# Patient Record
Sex: MALE | Race: WHITE | NOT HISPANIC OR LATINO | Employment: OTHER | URBAN - METROPOLITAN AREA
[De-identification: names, ages, dates, MRNs, and addresses within clinical notes are randomized per-mention and may not be internally consistent; named-entity substitution may affect disease eponyms.]

---

## 2017-02-27 ENCOUNTER — OFFICE VISIT (OUTPATIENT)
Dept: FAMILY MEDICINE | Facility: CLINIC | Age: 62
End: 2017-02-27
Payer: COMMERCIAL

## 2017-02-27 VITALS
HEIGHT: 70 IN | TEMPERATURE: 96.9 F | OXYGEN SATURATION: 96 % | BODY MASS INDEX: 33.93 KG/M2 | DIASTOLIC BLOOD PRESSURE: 82 MMHG | WEIGHT: 237 LBS | HEART RATE: 51 BPM | SYSTOLIC BLOOD PRESSURE: 139 MMHG

## 2017-02-27 DIAGNOSIS — H61.23 BILATERAL IMPACTED CERUMEN: Primary | ICD-10-CM

## 2017-02-27 PROCEDURE — 99213 OFFICE O/P EST LOW 20 MIN: CPT | Performed by: FAMILY MEDICINE

## 2017-02-27 NOTE — MR AVS SNAPSHOT
After Visit Summary   2/27/2017    Quang Gillespie    MRN: [id number]           Patient Information     Date Of Birth          1955        Visit Information        Provider Department      2/27/2017 7:40 AM Je Henley MD [facility]        Today's Diagnoses     Bilateral impacted cerumen    -  1      Care Instructions          Thank you for choosing [facility].  You may be receiving a survey in the mail from [facility] regarding your visit today.  Please take a few minutes to complete and return the survey to let us know how we are doing.      If you have questions or concerns, please contact us via [employer] or you can contact your care team at [phone].    Our Clinic hours are:  Monday 6:40 am  to 7:00 pm  Tuesday -Friday 6:40 am to 5:00 pm    The [place] outpatient lab hours are:  Monday - Friday 6:10 am to 4:45 pm  Saturdays 7:00 am to 11:00 am  Appointments are required, call [phone]    If you have clinical questions after hours or would like to schedule an appointment,  call the clinic at [phone].        Follow-ups after your visit        Who to contact     If you have questions or need follow up information about today's clinic visit or your schedule please contact [facility] directly at [phone].  Normal or non-critical lab and imaging results will be communicated to you by [employer], letter or phone within 4 business days after the clinic has received the results. If you do not hear from us within 7 days, please contact the clinic through [employer] or phone. If you have a critical or abnormal lab result, we will notify you by phone as soon as possible.  Submit refill requests through [employer] or call your pharmacy and they will forward the refill request to us. Please allow 3 business days for your refill to be completed.          Additional Information About Your Visit        [employer] Information     Microtune lets you send  "messages to your doctor, view your test results, renew your prescriptions, schedule appointments and more. To sign up, go to [email or web address] . Click on \"Log in\" on the left side of the screen, which will take you to the Welcome page. Then click on \"Sign up Now\" on the right side of the page.     You will be asked to enter the access code listed below, as well as some personal information. Please follow the directions to create your username and password.     Your access code is: [email or web address]  Expires: 2017 10:09 AM     Your access code will  in 90 days. If you need help or a new code, please call your [place] clinic or [phone].        Care EveryWhere ID     This is your Care EveryWhere ID. This could be used by other organizations to access your [place] medical records  [id number]        Your Vitals Were     Pulse Temperature Height Pulse Oximetry BMI (Body Mass Index)       51 96.9  F (36.1  C) (Tympanic) 5' 9.5\" (1.765 m) 96% 34.5 kg/m2        Blood Pressure from Last 3 Encounters:   17 139/82   16 131/75   03/18/15 120/78    Weight from Last 3 Encounters:   17 237 lb (107.5 kg)   16 233 lb (105.7 kg)   03/18/15 228 lb (103.4 kg)              Today, you had the following     No orders found for display       Primary Care Provider Office Phone # Fax #    Rick Canela [phone]       [facility] [address] [facility] [address]        Thank you!     Thank you for choosing [facility]  for your care. Our goal is always to provide you with excellent care. Hearing back from our patients is one way we can continue to improve our services. Please take a few minutes to complete the written survey that you may receive in the mail after your visit with us. Thank you!             Your Updated Medication List - Protect others around you: Learn how to safely use, store and throw away your medicines at " www.disposemymeds.org.          This list is accurate as of: 2/27/17 10:09 AM.  Always use your most recent med list.                   Brand Name Dispense Instructions for use    aspirin 81 MG tablet      Take 1 tablet by mouth daily.       carbamide peroxide 6.5 % otic solution    DEBROX    30 mL    Place 5-10 drops into both ears 2 times daily       lisinopril 5 MG tablet    PRINIVIL/ZESTRIL    30 tablet    Take 1 tablet (5 mg) by mouth daily       metoprolol 50 MG tablet    LOPRESSOR    60 tablet    Take 1 tablet (50 mg) by mouth 2 times daily       rosuvastatin 20 MG tablet    CRESTOR    30 tablet    Take 1 tablet (20 mg) by mouth daily       triamterene-hydrochlorothiazide 37.5-25 MG per tablet    MAXZIDE-25    30 tablet    Take 1 tablet by mouth daily

## 2017-02-27 NOTE — NURSING NOTE
"Chief Complaint   Patient presents with     Cerumen Impaction       Initial /82 (BP Location: Right arm, Cuff Size: Adult Large)  Pulse 51  Temp 96.9  F (36.1  C) (Tympanic)  Ht 5' 9.5\" (1.765 m)  Wt 237 lb (107.5 kg)  SpO2 96%  BMI 34.5 kg/m2 Estimated body mass index is 34.5 kg/(m^2) as calculated from the following:    Height as of this encounter: 5' 9.5\" (1.765 m).    Weight as of this encounter: 237 lb (107.5 kg).  Medication Reconciliation: complete  "

## 2017-02-27 NOTE — PROGRESS NOTES
SUBJECTIVE:                                                    Quang Gillespie is a 61 year old male who presents to clinic today for the following health issues:      ENT Symptoms             Symptoms: cc Present Absent Comment   Fever/Chills   x    Fatigue   x    Muscle Aches   x    Eye Irritation   x    Sneezing   x    Nasal Avtar/Drg   x    Sinus Pressure/Pain   x    Loss of smell   x    Dental pain   x    Sore Throat   x    Swollen Glands   x    Ear Pain/Fullness  x  Both ears plugged    Cough   x    Wheeze   x    Chest Pain   x    Shortness of breath   x    Rash   x    Other         Symptom duration:  7-16 but had partially cleaned at that time    Symptom severity:  moderate   Treatments tried:  debrox ear gtts    Contacts:  none          Here with ear impaction.    Patient states that he has had this feeling in both ears for a couple of weeks. He has been using the debrox . He is here for ear irrigation. No other systemic symptoms .    Problem list and histories reviewed & adjusted, as indicated.  Additional history: as documented    Patient Active Problem List   Diagnosis     Hyperlipidemia with target LDL less than 130     Hypertension goal BP (blood pressure) < 140/90     CAD (coronary artery disease)     Advanced directives, counseling/discussion     Impacted cerumen of both ears     Non morbid obesity due to excess calories     Past Surgical History   Procedure Laterality Date     Cardiac surgery       1 stentplaced       Social History   Substance Use Topics     Smoking status: Never Smoker     Smokeless tobacco: Never Used      Comment: Never smoker; no secondhand smoke exposure     Alcohol use Yes      Comment: 2 drinks per day     Family History   Problem Relation Age of Onset     CANCER Mother      brain tumor     C.A.D. Mother      Alcohol/Drug Mother      smoker     CANCER Father      hodgkins     Alcohol/Drug Father      smoker         Current Outpatient Prescriptions   Medication Sig Dispense  "Refill     triamterene-hydrochlorothiazide (MAXZIDE-25) 37.5-25 MG per tablet Take 1 tablet by mouth daily 30 tablet 11     rosuvastatin (CRESTOR) 20 MG tablet Take 1 tablet (20 mg) by mouth daily 30 tablet 11     metoprolol (LOPRESSOR) 50 MG tablet Take 1 tablet (50 mg) by mouth 2 times daily 60 tablet 11     lisinopril (PRINIVIL,ZESTRIL) 5 MG tablet Take 1 tablet (5 mg) by mouth daily 30 tablet 11     aspirin 81 MG tablet Take 1 tablet by mouth daily.       carbamide peroxide (DEBROX) 6.5 % otic (EAR) solution Place 5-10 drops into both ears 2 times daily 30 mL 0     No Known Allergies    ROS:  C: NEGATIVE for fever, chills, change in weight  EYES: NEGATIVE for vision changes or irritation  ENT/MOUTH: NEGATIVE for ear, mouth and throat problems and POSITIVE for earache bilateral  R: NEGATIVE for significant cough or SOB  CV: NEGATIVE for chest pain, palpitations or peripheral edema    OBJECTIVE:                                                    /82 (BP Location: Right arm, Cuff Size: Adult Large)  Pulse 51  Temp 96.9  F (36.1  C) (Tympanic)  Ht 5' 9.5\" (1.765 m)  Wt 237 lb (107.5 kg)  SpO2 96%  BMI 34.5 kg/m2  Body mass index is 34.5 kg/(m^2).  GENERAL: healthy, alert and no distress  HENT: normal cephalic/atraumatic, both ears: occluded with wax, nose and mouth without ulcers or lesions, oropharynx clear and oral mucous membranes moist  NECK: no adenopathy, no asymmetry, masses, or scars and thyroid normal to palpation  RESP: lungs clear to auscultation - no rales, rhonchi or wheezes  CV: regular rate and rhythm, normal S1 S2, no S3 or S4, no murmur, click or rub, no peripheral edema and peripheral pulses strong  MS: no gross musculoskeletal defects noted, no edema    Diagnostic Test Results:  none      ASSESSMENT/PLAN:                                                        1. Bilateral impacted cerumen  Ear irrigation performed. No infection on second look      FUTURE APPOINTMENTS:       - Follow-up visit " as needed.    Je Henley MD  [facility]

## 2017-05-15 ENCOUNTER — TELEPHONE (OUTPATIENT)
Dept: NURSING | Facility: CLINIC | Age: 62
End: 2017-05-15

## 2017-05-15 NOTE — TELEPHONE ENCOUNTER
"Call Type: Triage Call    Presenting Problem: \"Lately I've had shortness of breath when I try  to exhert myself with anything.\" Denies all symptoms at present.  Triage Note:  Guideline Title: Breathing Problems  Recommended Disposition: See Provider within 24 hours  Original Inclination: Wanted to speak with a nurse  Override Disposition:  Intended Action: See /Make Appt  Physician Contacted: No  Breathing problems occur with activity or exercise that has not caused symptoms  before AND are relieved by stopping the activity or exercise ?  YES  New or worsening signs and symptoms that may indicate shock ? NO  Diabetes and breathing problems ? NO  Coughing up large amount of obvious blood (not blood-streaked sputum) ? NO  Cough without breathing difficulty ? NO  Not breathing (no air movement from nose/mouth; no chest or abdomen movement) ? NO  Sudden onset of severe breathing difficulty ? NO  Gradual onset of breathing problems occur when lying down OR that limit normal  activity ? NO  Known or suspected ingestion or inhalation of foreign object (grasping at throat,  unable to speak, high pitched noise when breathing in, unable to swallow) AND  object still lodged ? NO  Following blunt trauma or penetrating wound to chest, throat, neck or abdomen OR  change in shape of chest wall ? NO  Breathing problems develop at high altitudes (above 8,000 ft.) OR increases with  altitude ? NO  New or worsening shortness of breath/difficulty breathing AND any other cardiac  signs/symptoms for more than 5 minutes, now or within last hour ? NO  Symptoms following smoke or fume inhalation AND have not improved or are worsening  after 8 hours of home care ? NO  Coughed out foreign object after choking episode and NO further choking symptoms  (can now talk, no coughing, gasping, or wheezing) ? NO  New or worsening breathing difficulty AND new onset of bloody or blood-streaked  sputum ? NO  New or increasing production of yellow, " green or brown sputum ? NO  New onset shortness of breath AND recent surgery or trauma (within 4 wks.),  prolonged immobilization (bedrest, long travel) OR taking medication with  estrogen ? NO  Coughing, wheezing or shortness of breath continues after foreign body is cleared  (expelled) from airway ? NO  Currently having difficulty breathing after near drowning ? NO  Episode(s) of hyperventilation AND not previously evaluated ? NO  New or worsening shortness of breath/difficulty breathing AND new onset of  fatigue, weakness, confusion, or change in ability to care out daily activities ?  NO  Difficulty swallowing ? NO  Signs and symptoms of anaphylaxis ? NO  Recurring episodes of hiccups or an episode that can't be stopped (more than 24  hours) ? NO  Sudden onset of shortness of breath, chest pain and cough with blood tinged sputum  ? NO  Being treated by a provider for a secondary infection AND no improvement in  symptoms, symptoms have worsened OR has new symptoms after following treatment  plan for the time specified by provider. ? NO  New or worsening breathing problems that have not been evaluated OR without a  treatment plan ? NO  New or worsening breathing problems not responding to treatment plan ? NO  New onset of breathing difficulty AND any temperature elevation in an  immunocompromised individual or frail elderly ? NO  Physician Instructions:  Care Advice: Call  if sudden worsening of breathing problem, dusky or  blue/gray color of lips, fingernail beds or skin  chest pain, weakness, or confusion occurs.  SYMPTOM / CONDITION MANAGEMENT

## 2017-05-17 ENCOUNTER — OFFICE VISIT (OUTPATIENT)
Dept: FAMILY MEDICINE | Facility: CLINIC | Age: 62
End: 2017-05-17
Payer: COMMERCIAL

## 2017-05-17 VITALS
TEMPERATURE: 97 F | DIASTOLIC BLOOD PRESSURE: 72 MMHG | WEIGHT: 231 LBS | HEART RATE: 56 BPM | SYSTOLIC BLOOD PRESSURE: 125 MMHG | BODY MASS INDEX: 33.07 KG/M2 | HEIGHT: 70 IN

## 2017-05-17 DIAGNOSIS — R06.09 EXERTIONAL DYSPNEA: Primary | ICD-10-CM

## 2017-05-17 PROCEDURE — 93000 ELECTROCARDIOGRAM COMPLETE: CPT | Performed by: FAMILY MEDICINE

## 2017-05-17 PROCEDURE — 99214 OFFICE O/P EST MOD 30 MIN: CPT | Performed by: FAMILY MEDICINE

## 2017-05-17 NOTE — MR AVS SNAPSHOT
After Visit Summary   5/17/2017    Quang Gillesipe    MRN: [id number]           Patient Information     Date Of Birth          1955        Visit Information        Provider Department      5/17/2017 7:40 AM Rafael Velasco MD [facility]        Today's Diagnoses     Exertional dyspnea    -  1      Care Instructions    EKG shows sinus bradycardia or slow regular heart rate, and no sign of acute heart attack.    Stress echocardiogram ordered to look at heart function when you are exerting.  Contact Cardiac Services  at [phone], to schedule this appointment.    If you experience new chest pain, persistent shortness of breath, fainting or palpitations, you should be brought to the ER.      Thank you for choosing [facility].  You may be receiving a survey in the mail from [employer] regarding your visit today.  Please take a few minutes to complete and return the survey to let us know how we are doing.      If you have questions or concerns, please contact us via [employer] or you can contact your care team at [phone].    Our Clinic hours are:  Monday 6:40 am  to 7:00 pm  Tuesday -Friday 6:40 am to 5:00 pm    The [place] outpatient lab hours are:  Monday - Friday 6:10 am to 4:45 pm  Saturdays 7:00 am to 11:00 am  Appointments are required, call [phone]    If you have clinical questions after hours or would like to schedule an appointment,  call the clinic at [phone].          Follow-ups after your visit        Follow-up notes from your care team     Return if symptoms worsen or fail to improve.      Future tests that were ordered for you today     Open Future Orders        Priority Expected Expires Ordered    Exercise Stress Echocardiogram Routine  5/17/2018 5/17/2017            Who to contact     If you have questions or need follow up information about today's clinic visit or your schedule please contact [facility] directly at  "[phone].  Normal or non-critical lab and imaging results will be communicated to you by MyChart, letter or phone within 4 business days after the clinic has received the results. If you do not hear from us within 7 days, please contact the clinic through [employer] or phone. If you have a critical or abnormal lab result, we will notify you by phone as soon as possible.  Submit refill requests through [employer] or call your pharmacy and they will forward the refill request to us. Please allow 3 business days for your refill to be completed.          Additional Information About Your Visit        [employer] Information     [employer] lets you send messages to your doctor, view your test results, renew your prescriptions, schedule appointments and more. To sign up, go to [email or web address] . Click on \"Log in\" on the left side of the screen, which will take you to the Welcome page. Then click on \"Sign up Now\" on the right side of the page.     You will be asked to enter the access code listed below, as well as some personal information. Please follow the directions to create your username and password.     Your access code is: [email or web address]  Expires: 2017 11:09 AM     Your access code will  in 90 days. If you need help or a new code, please call your [place] clinic or [phone].        Care EveryWhere ID     This is your Care EveryWhere ID. This could be used by other organizations to access your [place] medical records  [id number]        Your Vitals Were     Pulse Temperature Height BMI (Body Mass Index)          56 97  F (36.1  C) (Tympanic) 5' 9.5\" (1.765 m) 33.62 kg/m2         Blood Pressure from Last 3 Encounters:   17 125/72   17 139/82   16 131/75    Weight from Last 3 Encounters:   17 231 lb (104.8 kg)   17 237 lb (107.5 kg)   16 233 lb (105.7 kg)              We Performed the Following     EKG 12-lead complete w/read - Clinics        Primary Care Provider    " Provider Unknown       No address on file        Thank you!     Thank you for choosing [facility]  for your care. Our goal is always to provide you with excellent care. Hearing back from our patients is one way we can continue to improve our services. Please take a few minutes to complete the written survey that you may receive in the mail after your visit with us. Thank you!             Your Updated Medication List - Protect others around you: Learn how to safely use, store and throw away your medicines at www.disposemymeds.org.          This list is accurate as of: 5/17/17  8:26 AM.  Always use your most recent med list.                   Brand Name Dispense Instructions for use    aspirin 81 MG tablet      Take 1 tablet by mouth daily.       lisinopril 5 MG tablet    PRINIVIL/ZESTRIL    30 tablet    Take 1 tablet (5 mg) by mouth daily       metoprolol 50 MG tablet    LOPRESSOR    60 tablet    Take 1 tablet (50 mg) by mouth 2 times daily       rosuvastatin 20 MG tablet    CRESTOR    30 tablet    Take 1 tablet (20 mg) by mouth daily       triamterene-hydrochlorothiazide 37.5-25 MG per tablet    MAXZIDE-25    30 tablet    Take 1 tablet by mouth daily

## 2017-05-17 NOTE — NURSING NOTE
"Chief Complaint   Patient presents with     Heart Problem     Patient has history of stent placement about 5 years ago, started noticing similar symptoms in the past few months, nausea with excertion.  No chest pain.  Slight SOA - not sure if related to activity or cardiac.         Initial /72 (BP Location: Right arm, Patient Position: Chair, Cuff Size: Adult Large)  Pulse 56  Temp 97  F (36.1  C) (Tympanic)  Ht 5' 9.5\" (1.765 m)  Wt 231 lb (104.8 kg)  BMI 33.62 kg/m2 Estimated body mass index is 33.62 kg/(m^2) as calculated from the following:    Height as of this encounter: 5' 9.5\" (1.765 m).    Weight as of this encounter: 231 lb (104.8 kg).  Medication Reconciliation: complete  "

## 2017-05-17 NOTE — PROGRESS NOTES
"  SUBJECTIVE:                                                    Quang Gillespie is a 61 year old male who presents to clinic today for the following health issues:      Chief Complaint   Patient presents with     Heart Problem     Patient has history of stent placement about 5 years ago, started noticing similar symptoms in the past few months, nausea with excertion.  No chest pain.  Slight SOA - not sure if related to activity or cardiac.         Exertional symptoms     Onset: few months ago    Description:   Location:  Denies chest pain  Character: \"head cloudy\", mild nausea, mild lightheadedness, dyspnea on strenuous activity  Radiation: n/a  Duration: intermittent, lasting less than 5 minutes per episode    Intensity: mild    Progression of Symptoms:  intermittent    Accompanying Signs & Symptoms:  Shortness of breath: YES- see above  Sweating: no   Nausea/vomiting: YES  Lightheadedness: YES  Palpitations: no  Fever/Chills: no   Cough: no   Heartburn: YES - patient reports \"frequent\" - does not take med   History:   Family history of heart disease no  Tobacco use: no     Precipitating factors:   Worse with exertion: YES- intermittent  Worse with deep breaths :  no   Related to food: no     Alleviating factors:  rest       Therapies Tried and outcome: none    Verified above history with patient.    Patient states he has none of the symptoms above today.      Problem list and histories reviewed & adjusted, as indicated.  Additional history: as documented    Patient Active Problem List   Diagnosis     Hyperlipidemia with target LDL less than 130     Hypertension goal BP (blood pressure) < 140/90     CAD (coronary artery disease)     Advanced directives, counseling/discussion     Impacted cerumen of both ears     Non morbid obesity due to excess calories     Past Surgical History:   Procedure Laterality Date     CARDIAC SURGERY      1 stentplaced       Social History   Substance Use Topics     Smoking status: " Never Smoker     Smokeless tobacco: Never Used      Comment: Never smoker; no secondhand smoke exposure     Alcohol use Yes      Comment: 2 drinks per day     Family History   Problem Relation Age of Onset     CANCER Mother      brain tumor     C.A.D. Mother      Alcohol/Drug Mother      smoker     CANCER Father      hodgkins     Alcohol/Drug Father      smoker         Current Outpatient Prescriptions   Medication Sig Dispense Refill     triamterene-hydrochlorothiazide (MAXZIDE-25) 37.5-25 MG per tablet Take 1 tablet by mouth daily 30 tablet 11     rosuvastatin (CRESTOR) 20 MG tablet Take 1 tablet (20 mg) by mouth daily 30 tablet 11     metoprolol (LOPRESSOR) 50 MG tablet Take 1 tablet (50 mg) by mouth 2 times daily 60 tablet 11     lisinopril (PRINIVIL,ZESTRIL) 5 MG tablet Take 1 tablet (5 mg) by mouth daily 30 tablet 11     aspirin 81 MG tablet Take 1 tablet by mouth daily.       No Known Allergies  BP Readings from Last 3 Encounters:   05/17/17 125/72   02/27/17 139/82   06/06/16 131/75    Wt Readings from Last 3 Encounters:   05/17/17 231 lb (104.8 kg)   02/27/17 237 lb (107.5 kg)   06/06/16 233 lb (105.7 kg)                    Reviewed and updated as needed this visit by clinical staff  Allergies  Meds  Problems       Reviewed and updated as needed this visit by Provider  Allergies  Meds  Problems         ROS:  C: NEGATIVE for fever, chills, change in weight  I: NEGATIVE for worrisome rashes, moles or lesions  E: NEGATIVE for vision changes or irritation  R: NEGATIVE for significant cough or SOB  CV: NEGATIVE for chest pain, palpitations or peripheral edema  GI: NEGATIVE for nausea, abdominal pain, heartburn, or change in bowel habits  : NEGATIVE for frequency, dysuria, or hematuria  M: NEGATIVE for significant arthralgias or myalgia  N: NEGATIVE for weakness, dizziness or paresthesias  E: NEGATIVE for temperature intolerance, skin/hair changes  H: NEGATIVE for bleeding problems    OBJECTIVE:             "                                        /72 (BP Location: Right arm, Patient Position: Chair, Cuff Size: Adult Large)  Pulse 56  Temp 97  F (36.1  C) (Tympanic)  Ht 5' 9.5\" (1.765 m)  Wt 231 lb (104.8 kg)  BMI 33.62 kg/m2  Body mass index is 33.62 kg/(m^2).  GENERAL: , alert and no distress, ambulatory w/o assist  NECK: no tenderness, no adenopathy,  Thyroid not enlarged  RESP: lungs clear to auscultation - no rales, no rhonchi, no wheezes  CV: regular rates and rhythm, no murmur  MS: no edema  SKIN: no suspicious lesions, no rashes  Neuro: Patient is A and O X 3, Cranial nerves 2-12 intact, PERRL, Strength 5/5 all extremities,  Sensory intact to light touch, No problems with motor coordination  ABD:  nontender    Diagnostic test results:  Diagnostic Test Results:  none   EKG - appears normal, NSR, sinus bradycardia, normal axis, normal intervals, no acute ST/T changes c/w ischemia, no LVH by voltage criteria     ASSESSMENT/PLAN:                                                        ICD-10-CM    1. Exertional dyspnea R06.09 EKG 12-lead complete w/read - Clinics     Exercise Stress Echocardiogram     No distress or active symptoms today.  DDx: ACS, asthma, GERD, PE, costochondritis, chest wall spasm  Discussed work-up needed and patient agreed.  Discussed results of EKG - no tracing suspicious of acuteischemia.  Further tests: patient concurred with stress echo to evaluate cardiac causes during stress.  Continue meds: ASA daily and all other meds unchanged.  Return precautions discussed and given to patient.    If dyspnea worsens or more frequent, consider PFT.  Patient has no hx or sign suspicious for hypercoagulability so PE not highly considered.    Follow up with Provider - prn   Patient Instructions   EKG shows sinus bradycardia or slow regular heart rate, and no sign of acute heart attack.    Stress echocardiogram ordered to look at heart function when you are exerting.  Contact Cardiac Services "  at [phone], to schedule this appointment.    If you experience new chest pain, persistent shortness of breath, fainting or palpitations, you should be brought to the ER.      Thank you for choosing [facility].  You may be receiving a survey in the mail from Denn[employer]ano regarding your visit today.  Please take a few minutes to complete and return the survey to let us know how we are doing.      If you have questions or concerns, please contact us via Pulsant or you can contact your care team at [phone].    Our Clinic hours are:  Monday 6:40 am  to 7:00 pm  Tuesday -Friday 6:40 am to 5:00 pm    The [place] outpatient lab hours are:  Monday - Friday 6:10 am to 4:45 pm  Saturdays 7:00 am to 11:00 am  Appointments are required, call [phone]    If you have clinical questions after hours or would like to schedule an appointment,  call the clinic at [phone].        Rafael Velasco MD  [facility]

## 2017-05-17 NOTE — PATIENT INSTRUCTIONS
EKG shows sinus bradycardia or slow regular heart rate, and no sign of acute heart attack.    Stress echocardiogram ordered to look at heart function when you are exerting.  Contact Cardiac Services  at [phone], to schedule this appointment.    If you experience new chest pain, persistent shortness of breath, fainting or palpitations, you should be brought to the ER.      Thank you for choosing [facility].  You may be receiving a survey in the mail from [facility] regarding your visit today.  Please take a few minutes to complete and return the survey to let us know how we are doing.      If you have questions or concerns, please contact us via the grafter or you can contact your care team at [phone].    Our Clinic hours are:  Monday 6:40 am  to 7:00 pm  Tuesday -Friday 6:40 am to 5:00 pm    The [place] outpatient lab hours are:  Monday - Friday 6:10 am to 4:45 pm  Saturdays 7:00 am to 11:00 am  Appointments are required, call [phone]    If you have clinical questions after hours or would like to schedule an appointment,  call the clinic at [phone].

## 2017-05-23 ENCOUNTER — HOSPITAL ENCOUNTER (OUTPATIENT)
Dept: CARDIOLOGY | Facility: CLINIC | Age: 62
Discharge: HOME | End: 2017-05-23
Attending: FAMILY MEDICINE | Admitting: FAMILY MEDICINE
Payer: COMMERCIAL

## 2017-05-23 DIAGNOSIS — R06.09 EXERTIONAL DYSPNEA: ICD-10-CM

## 2017-05-23 PROCEDURE — 93321 DOPPLER ECHO F-UP/LMTD STD: CPT | Mod: 26 | Performed by: INTERNAL MEDICINE

## 2017-05-23 PROCEDURE — 25500064 ZZH RX 255 OP 636: Performed by: FAMILY MEDICINE

## 2017-05-23 PROCEDURE — 40000264 ECHO STRESS TEST WITH LUMASON

## 2017-05-23 PROCEDURE — 93018 CV STRESS TEST I&R ONLY: CPT | Performed by: INTERNAL MEDICINE

## 2017-05-23 PROCEDURE — 93350 STRESS TTE ONLY: CPT | Mod: 26 | Performed by: INTERNAL MEDICINE

## 2017-05-23 PROCEDURE — 93016 CV STRESS TEST SUPVJ ONLY: CPT | Performed by: INTERNAL MEDICINE

## 2017-05-23 PROCEDURE — 93325 DOPPLER ECHO COLOR FLOW MAPG: CPT | Mod: 26 | Performed by: INTERNAL MEDICINE

## 2017-05-23 RX ADMIN — SULFUR HEXAFLUORIDE 5 ML: KIT at 09:07

## 2017-06-22 ENCOUNTER — TELEPHONE (OUTPATIENT)
Dept: FAMILY MEDICINE | Facility: CLINIC | Age: 62
End: 2017-06-22

## 2017-06-22 DIAGNOSIS — E78.5 HYPERLIPIDEMIA WITH TARGET LDL LESS THAN 130: ICD-10-CM

## 2017-06-22 DIAGNOSIS — I10 ESSENTIAL HYPERTENSION WITH GOAL BLOOD PRESSURE LESS THAN 140/90: ICD-10-CM

## 2017-06-22 NOTE — TELEPHONE ENCOUNTER
Lisinopril     Last Written Prescription Date: 06/06/16  Last Fill Quantity: 30, # refills: 11  Last Office Visit with [facility], [facility] or [facility] prescribing provider: 05/17/17       Potassium   Date Value Ref Range Status   06/07/2016 3.9 3.4 - 5.3 mmol/L Final     Creatinine   Date Value Ref Range Status   06/07/2016 0.96 0.66 - 1.25 mg/dL Final     BP Readings from Last 3 Encounters:   05/17/17 125/72   02/27/17 139/82   06/06/16 131/75   Metoprolol  Last Written Prescription Date: 06/06/16  Last Fill Quantity: 60,  # refills: 11   Last Office Visit with [facility], [facility] or [facility] prescribing provider: 05/17/17  Triamterene-HCTZ      Last Written Prescription Date: 06/06/16  Last Fill Quantity: 30,  # refills: 11   Last Office Visit with [facility], [facility] or [facility] prescribing provider: 05/17/17  Rosuvastatin     Last Written Prescription Date: 06/06/16  Last Fill Quantity: 30, # refills: 11  Last Office Visit with [facility], [facility] or [facility] prescribing provider: 05/17/17       Lab Results   Component Value Date    CHOL 159 06/07/2016     Lab Results   Component Value Date    HDL 49 06/07/2016     Lab Results   Component Value Date    LDL 86 06/07/2016     Lab Results   Component Value Date    TRIG 122 06/07/2016     Lab Results   Component Value Date    CHOLHDLRATIO 2.9 03/18/2015

## 2017-06-27 RX ORDER — ROSUVASTATIN CALCIUM 20 MG/1
20 TABLET, COATED ORAL DAILY
Qty: 30 TABLET | Refills: 0 | Status: SHIPPED | OUTPATIENT
Start: 2017-06-27 | End: 2017-07-06

## 2017-06-27 RX ORDER — TRIAMTERENE/HYDROCHLOROTHIAZID 37.5-25 MG
1 TABLET ORAL DAILY
Qty: 30 TABLET | Refills: 0 | Status: SHIPPED | OUTPATIENT
Start: 2017-06-27 | End: 2017-07-06

## 2017-06-27 RX ORDER — LISINOPRIL 5 MG/1
5 TABLET ORAL DAILY
Qty: 30 TABLET | Refills: 0 | Status: SHIPPED | OUTPATIENT
Start: 2017-06-27 | End: 2017-07-06

## 2017-06-27 RX ORDER — METOPROLOL TARTRATE 50 MG
50 TABLET ORAL 2 TIMES DAILY
Qty: 60 TABLET | Refills: 0 | Status: SHIPPED | OUTPATIENT
Start: 2017-06-27 | End: 2017-07-06

## 2017-06-27 NOTE — TELEPHONE ENCOUNTER
Pt informed.  Medication is being filled for 1 time refill only due to:  Patient needs to be seen because it has been more than one year since last visit.   Cassie Baker RN

## 2017-06-27 NOTE — TELEPHONE ENCOUNTER
Lab Results   Component Value Date    CR 0.96 06/07/2016     Lab Results   Component Value Date    POTASSIUM 3.9 06/07/2016     Recent Labs   Lab Test  06/07/16   0605  03/18/15   0853  02/17/11   0927   CHOL  159  137  131   HDL  49  47  39*   LDL  86  72  72   TRIG  122  91  102   CHOLHDLRATIO   --   2.9  3.4

## 2017-06-27 NOTE — TELEPHONE ENCOUNTER
Patient due for labs for med refills    Left message for patient to return call to clinic.    Judith FLORES Rn

## 2017-07-06 ENCOUNTER — OFFICE VISIT (OUTPATIENT)
Dept: FAMILY MEDICINE | Facility: CLINIC | Age: 62
End: 2017-07-06
Payer: COMMERCIAL

## 2017-07-06 VITALS
HEART RATE: 54 BPM | TEMPERATURE: 96.5 F | DIASTOLIC BLOOD PRESSURE: 66 MMHG | WEIGHT: 232.6 LBS | SYSTOLIC BLOOD PRESSURE: 116 MMHG | BODY MASS INDEX: 33.3 KG/M2 | HEIGHT: 70 IN

## 2017-07-06 DIAGNOSIS — I10 ESSENTIAL HYPERTENSION WITH GOAL BLOOD PRESSURE LESS THAN 140/90: ICD-10-CM

## 2017-07-06 DIAGNOSIS — Z00.00 ENCOUNTER FOR ROUTINE ADULT HEALTH EXAMINATION WITHOUT ABNORMAL FINDINGS: Primary | ICD-10-CM

## 2017-07-06 DIAGNOSIS — Z11.59 NEED FOR HEPATITIS C SCREENING TEST: ICD-10-CM

## 2017-07-06 DIAGNOSIS — E78.5 HYPERLIPIDEMIA WITH TARGET LDL LESS THAN 130: ICD-10-CM

## 2017-07-06 LAB
ANION GAP SERPL CALCULATED.3IONS-SCNC: 6 MMOL/L (ref 3–14)
BUN SERPL-MCNC: 19 MG/DL (ref 7–30)
CALCIUM SERPL-MCNC: 8.8 MG/DL (ref 8.5–10.1)
CHLORIDE SERPL-SCNC: 106 MMOL/L (ref 94–109)
CHOLEST SERPL-MCNC: 160 MG/DL
CO2 SERPL-SCNC: 28 MMOL/L (ref 20–32)
CREAT SERPL-MCNC: 0.92 MG/DL (ref 0.66–1.25)
GFR SERPL CREATININE-BSD FRML MDRD: 84 ML/MIN/1.7M2
GLUCOSE SERPL-MCNC: 108 MG/DL (ref 70–99)
HCV AB SERPL QL IA: NORMAL
HDLC SERPL-MCNC: 52 MG/DL
LDLC SERPL CALC-MCNC: 90 MG/DL
NONHDLC SERPL-MCNC: 108 MG/DL
POTASSIUM SERPL-SCNC: 3.8 MMOL/L (ref 3.4–5.3)
SODIUM SERPL-SCNC: 140 MMOL/L (ref 133–144)
TRIGL SERPL-MCNC: 90 MG/DL

## 2017-07-06 PROCEDURE — 36415 COLL VENOUS BLD VENIPUNCTURE: CPT | Performed by: FAMILY MEDICINE

## 2017-07-06 PROCEDURE — 86803 HEPATITIS C AB TEST: CPT | Performed by: FAMILY MEDICINE

## 2017-07-06 PROCEDURE — 99396 PREV VISIT EST AGE 40-64: CPT | Performed by: FAMILY MEDICINE

## 2017-07-06 PROCEDURE — 80048 BASIC METABOLIC PNL TOTAL CA: CPT | Performed by: FAMILY MEDICINE

## 2017-07-06 PROCEDURE — 80061 LIPID PANEL: CPT | Performed by: FAMILY MEDICINE

## 2017-07-06 RX ORDER — ROSUVASTATIN CALCIUM 20 MG/1
20 TABLET, COATED ORAL DAILY
Qty: 30 TABLET | Refills: 11 | Status: SHIPPED | OUTPATIENT
Start: 2017-07-06 | End: 2018-07-23

## 2017-07-06 RX ORDER — METOPROLOL TARTRATE 50 MG
50 TABLET ORAL 2 TIMES DAILY
Qty: 60 TABLET | Refills: 11 | Status: SHIPPED | OUTPATIENT
Start: 2017-07-06 | End: 2018-07-23

## 2017-07-06 RX ORDER — LISINOPRIL 5 MG/1
5 TABLET ORAL DAILY
Qty: 30 TABLET | Refills: 11 | Status: SHIPPED | OUTPATIENT
Start: 2017-07-06 | End: 2018-07-23

## 2017-07-06 RX ORDER — TRIAMTERENE/HYDROCHLOROTHIAZID 37.5-25 MG
1 TABLET ORAL DAILY
Qty: 30 TABLET | Refills: 11 | Status: SHIPPED | OUTPATIENT
Start: 2017-07-06 | End: 2018-07-23

## 2017-07-06 NOTE — NURSING NOTE
"Chief Complaint   Patient presents with     Physical     Pt had lab work done this morning.       Initial /66  Pulse 54  Temp 96.5  F (35.8  C) (Tympanic)  Ht 5' 9.75\" (1.772 m)  Wt 232 lb 9.6 oz (105.5 kg)  BMI 33.61 kg/m2 Estimated body mass index is 33.61 kg/(m^2) as calculated from the following:    Height as of this encounter: 5' 9.75\" (1.772 m).    Weight as of this encounter: 232 lb 9.6 oz (105.5 kg).  Medication Reconciliation: complete  Sheyla Flaherty CMA    "

## 2017-07-06 NOTE — PATIENT INSTRUCTIONS
You will be contacted in 1-2 days for results of your lab tests.    Low salt, low fat diet.  Eat 5 cups of vegetables, fruits and whole grains per day.  Limit starchy food (white rice, white bread, white pasta, white potatoes) to less than a cup per meal.  Minimize sweets, junk food and fastfood.  Exercise: moderate intensity sustained for at least 30 mins per episode, goal of 150 mins per week at least  Weight loss goal: 5-10 % of current weight in 4-6 months to start with, and gradually and incrementally so after that.    Prescriptions have been refilled.    Get a flu shot in October.      Preventive Health Recommendations  Male Ages 50   64    Yearly exam:             See your health care provider every year in order to  o   Review health changes.   o   Discuss preventive care.    o   Review your medicines if your doctor has prescribed any.     Have a cholesterol test every 5 years, or more frequently if you are at risk for high cholesterol/heart disease.     Have a diabetes test (fasting glucose) every three years. If you are at risk for diabetes, you should have this test more often.     Have a colonoscopy at age 50, or have a yearly FIT test (stool test). These exams will check for colon cancer.      Talk with your health care provider about whether or not a prostate cancer screening test (PSA) is right for you.    You should be tested each year for STDs (sexually transmitted diseases), if you re at risk.     Shots: Get a flu shot each year. Get a tetanus shot every 10 years.     Nutrition:    Eat at least 5 servings of fruits and vegetables daily.     Eat whole-grain bread, whole-wheat pasta and brown rice instead of white grains and rice.     Talk to your provider about Calcium and Vitamin D.     Lifestyle    Exercise for at least 150 minutes a week (30 minutes a day, 5 days a week). This will help you control your weight and prevent disease.     Limit alcohol to one drink per day.     No smoking.     Wear  sunscreen to prevent skin cancer.     See your dentist every six months for an exam and cleaning.     See your eye doctor every 1 to 2 years.

## 2017-07-06 NOTE — LETTER
Quang YVAN [place]  [id number] [facility] [address]        July 7, 2017          Dear ,    We are writing to inform you of your test results.    Cholesterol panel and hepatitis C screening tests are normal.   Basic metabolic panel showed marginally elevated blood sugar; this is not diabetes.     Continue low salt, low fat, and low starchy food diet.   Eat 5 cups of vegetables, fruits and whole grains per day.   Limit starchy food (white rice, white bread, white pasta, white potatoes) to less than a cup per meal.   Minimize sweets, junk food and fastfood.   Exercise: moderate intensity sustained for at least 30 mins per episode, goal of 150 mins per week at least   Weight loss goal: 5-10 % of current weight in 4-6 months for starters.   All lab tests may be repeated in one year.     Resulted Orders   Lipid Profile with reflex to direct LDL   Result Value Ref Range    Cholesterol 160 <200 mg/dL    Triglycerides 90 <150 mg/dL    HDL Cholesterol 52 >39 mg/dL    LDL Cholesterol Calculated 90 <100 mg/dL      Comment:      Desirable:       <100 mg/dl    Non HDL Cholesterol 108 <130 mg/dL   Basic metabolic panel  (Ca, Cl, CO2, Creat, Gluc, K, Na, BUN)   Result Value Ref Range    Sodium 140 133 - 144 mmol/L    Potassium 3.8 3.4 - 5.3 mmol/L    Chloride 106 94 - 109 mmol/L    Carbon Dioxide 28 20 - 32 mmol/L    Anion Gap 6 3 - 14 mmol/L    Glucose 108 (H) 70 - 99 mg/dL    Urea Nitrogen 19 7 - 30 mg/dL    Creatinine 0.92 0.66 - 1.25 mg/dL    GFR Estimate 84 >60 mL/min/1.7m2      Comment:      Non  GFR Calc    GFR Estimate If Black >90   GFR Calc   >60 mL/min/1.7m2    Calcium 8.8 8.5 - 10.1 mg/dL   **Hepatitis C Screen Reflex to RNA FUTURE anytime   Result Value Ref Range    Hepatitis C Antibody  NR     Nonreactive   Assay performance characteristics have not been established for newborns,   infants, and children         Thank you very much for choosing [facility]. Please call my office if you have any questions or concerns.       Sincerely,        Rafael Velasco MD/Juliet DACOSTA CMA

## 2017-07-06 NOTE — PROGRESS NOTES
SUBJECTIVE:   CC: Quang Gillespie is an 61 year old male who presents for preventative health visit.     Healthy Habits:    Do you get at least three servings of calcium containing foods daily (dairy, green leafy vegetables, etc.)? 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 yes    Do you see a dentist twice per year? Once per yr    Do you have sleep apnea, excessive snoring or daytime drowsiness?no    No other concerns.    Patient needs refill of his prescriptions.      Today's PHQ-2 Score:   PHQ-2 ( 1999 [employer]) 7/6/2017 5/17/2017   Q1: Little interest or pleasure in doing things 0 0   Q2: Feeling down, depressed or hopeless 0 0   PHQ-2 Score 0 0       Abuse: Current or Past(Physical, Sexual or Emotional)- No  Do you feel safe in your environment - Yes    Social History   Substance Use Topics     Smoking status: Never Smoker     Smokeless tobacco: Never Used      Comment: Never smoker; no secondhand smoke exposure     Alcohol use Yes      Comment: 2 drinks per day     The patient does not drink >3 drinks per day nor >7 drinks per week.    Last PSA:   PSA   Date Value Ref Range Status   03/18/2015 0.38 0 - 4 ug/L Final       Reviewed orders with patient. Reviewed health maintenance and updated orders accordingly - Yes  Labs reviewed in EPIC  BP Readings from Last 3 Encounters:   07/06/17 116/66   05/17/17 125/72   02/27/17 139/82    Wt Readings from Last 3 Encounters:   07/06/17 232 lb 9.6 oz (105.5 kg)   05/17/17 231 lb (104.8 kg)   02/27/17 237 lb (107.5 kg)                  Patient Active Problem List   Diagnosis     Hyperlipidemia with target LDL less than 130     Hypertension goal BP (blood pressure) < 140/90     CAD (coronary artery disease)     Advanced directives, counseling/discussion     Impacted cerumen of  both ears     Non morbid obesity due to excess calories     Past Surgical History:   Procedure Laterality Date     CARDIAC SURGERY      1 stentplaced       Social History   Substance Use Topics     Smoking status: Never Smoker     Smokeless tobacco: Never Used      Comment: Never smoker; no secondhand smoke exposure     Alcohol use Yes      Comment: 2 drinks per day     Family History   Problem Relation Age of Onset     CANCER Mother      brain tumor     C.A.D. Mother      Alcohol/Drug Mother      smoker     CANCER Father      hodgkins     Alcohol/Drug Father      smoker         Current Outpatient Prescriptions   Medication Sig Dispense Refill     rosuvastatin (CRESTOR) 20 MG tablet Take 1 tablet (20 mg) by mouth daily 30 tablet 11     triamterene-hydrochlorothiazide (MAXZIDE-25) 37.5-25 MG per tablet Take 1 tablet by mouth daily 30 tablet 11     metoprolol (LOPRESSOR) 50 MG tablet Take 1 tablet (50 mg) by mouth 2 times daily 60 tablet 11     lisinopril (PRINIVIL/ZESTRIL) 5 MG tablet Take 1 tablet (5 mg) by mouth daily 30 tablet 11     aspirin 81 MG tablet Take 1 tablet by mouth daily.       [DISCONTINUED] rosuvastatin (CRESTOR) 20 MG tablet Take 1 tablet (20 mg) by mouth daily 30 tablet 0     [DISCONTINUED] triamterene-hydrochlorothiazide (MAXZIDE-25) 37.5-25 MG per tablet Take 1 tablet by mouth daily 30 tablet 0     [DISCONTINUED] metoprolol (LOPRESSOR) 50 MG tablet Take 1 tablet (50 mg) by mouth 2 times daily 60 tablet 0     [DISCONTINUED] lisinopril (PRINIVIL/ZESTRIL) 5 MG tablet Take 1 tablet (5 mg) by mouth daily 30 tablet 0     No Known Allergies    Reviewed and updated as needed this visit by clinical staff  Allergies  Meds  Problems         Reviewed and updated as needed this visit by Provider  Allergies  Meds  Problems        Past Medical History:   Diagnosis Date     Hyperlipidemia LDL goal < 130      Hypertension       Past Surgical History:   Procedure Laterality Date     CARDIAC SURGERY      1  "stentplaced       ROS:  C: NEGATIVE for fever, chills, change in weight  I: NEGATIVE for worrisome rashes, moles or lesions  E: NEGATIVE for vision changes or irritation  ENT: NEGATIVE for ear, mouth and throat problems  R: NEGATIVE for significant cough or SOB  CV: NEGATIVE for chest pain, palpitations or peripheral edema  GI: NEGATIVE for nausea, abdominal pain, heartburn, or change in bowel habits   male: negative for dysuria, hematuria, decreased urinary stream, erectile dysfunction, urethral discharge  M: NEGATIVE for significant arthralgias or myalgia  N: NEGATIVE for weakness, dizziness or paresthesias  E: NEGATIVE for temperature intolerance, skin/hair changes  H: NEGATIVE for bleeding problems  P: NEGATIVE for changes in mood or affect    OBJECTIVE:   /66  Pulse 54  Temp 96.5  F (35.8  C) (Tympanic)  Ht 5' 9.75\" (1.772 m)  Wt 232 lb 9.6 oz (105.5 kg)  BMI 33.61 kg/m2  EXAM:  GENERAL APPEARANCE:  alert and no distress, ambulatory w/o assist, obese  EYES: pink conj, no icterus, PERRL, EOMI  HENT: ear canals and TM's normal, nose and mouth without ulcers or lesions, oropharynx clear and oral mucous membranes moist  NECK: no adenopathy, no asymmetry, masses, or scars and thyroid normal to palpation  RESP: lungs clear to auscultation - no rales, rhonchi or wheezes  CV: regular rates and rhythm, normal S1 S2, no S3 or S4, no murmur, click or rub, no peripheral edema and peripheral pulses strong  ABDOMEN: protuberant,  soft, nontender, no hepatosplenomegaly, no masses and bowel sounds normal  RECTAL: deferred  MS: no musculoskeletal defects are noted and gait is age appropriate without ataxia  SKIN: no suspicious lesions or rashes  NEURO: Normal strength and tone, sensory exam grossly normal, mentation intact and speech normal      ASSESSMENT/PLAN:   Quang was seen today for physical.    Diagnoses and all orders for this visit:    Encounter for routine adult health examination without abnormal " findings  Patient was advised on recommended screening and preventive health recommendations.  He verbalized understanding and agreed to the plans below.    Hyperlipidemia with target LDL less than 130  -     Lipid Profile with reflex to direct LDL  -     rosuvastatin (CRESTOR) 20 MG tablet; Take 1 tablet (20 mg) by mouth daily  Reinforced heart healthy lifestyle.  Waiting for results of lipid panel.    Essential hypertension with goal blood pressure less than 140/90  -     Basic metabolic panel  (Ca, Cl, CO2, Creat, Gluc, K, Na, BUN)  -     triamterene-hydrochlorothiazide (MAXZIDE-25) 37.5-25 MG per tablet; Take 1 tablet by mouth daily  -     metoprolol (LOPRESSOR) 50 MG tablet; Take 1 tablet (50 mg) by mouth 2 times daily  -     lisinopril (PRINIVIL/ZESTRIL) 5 MG tablet; Take 1 tablet (5 mg) by mouth daily  Controlled.  Low salt, low fat diet. Exercise as tolerated. Take meds as prescribed; call if with side effects.     BMI 33.0-33.9,adult  Discussed risks of obesity: heart disease, stroke, end-organ damage,  DM, decreased quality of life  Counselled on diet, exercise and weight loss regimen    Need for hepatitis C screening test  -     **Hepatitis C Screen Reflex to RNA FUTURE anytime        COUNSELING:  Reviewed preventive health counseling, as reflected in patient instructions       Regular exercise       Healthy diet/nutrition       Vision screening       Hearing screening       Safe sex practices/STD prevention       Consider Hep C screening for patients born between 1945 and 1965       Colon cancer screening       Prostate cancer screening       Osteoporosis Prevention/Bone Health       The 10-year ASCVD risk score (Shalom DIALLO Jr, et al., 2013) is: 6.4%    Values used to calculate the score:      Age: 61 years      Sex: Male      Is Non- : No      Diabetic: No      Tobacco smoker: No      Systolic Blood Pressure: 116 mmHg      Is BP treated: Yes      HDL Cholesterol: 49 mg/dL      Total  "Cholesterol: 159 mg/dL         reports that he has never smoked. He has never used smokeless tobacco.    Estimated body mass index is 33.61 kg/(m^2) as calculated from the following:    Height as of this encounter: 5' 9.75\" (1.772 m).    Weight as of this encounter: 232 lb 9.6 oz (105.5 kg).   Weight management plan: Discussed healthy diet and exercise guidelines and patient will follow up in 12 months in clinic to re-evaluate.    Counseling Resources:  ATP IV Guidelines  Pooled Cohorts Equation Calculator  FRAX Risk Assessment  ICSI Preventive Guidelines  Dietary Guidelines for Americans, 2010  USDA's MyPlate  ASA Prophylaxis  Lung CA Screening    Rafael Velasco MD  [facility]  "

## 2017-07-06 NOTE — MR AVS SNAPSHOT
After Visit Summary   7/6/2017    Quang Gillespie    MRN: [id number]           Patient Information     Date Of Birth          1955        Visit Information        Provider Department      7/6/2017 7:00 AM Rafael Velasco MD [facility]        Today's Diagnoses     Encounter for routine adult health examination without abnormal findings    -  1    Hyperlipidemia with target LDL less than 130        Essential hypertension with goal blood pressure less than 140/90        BMI 33.0-33.9,adult        Need for hepatitis C screening test          Care Instructions    You will be contacted in 1-2 days for results of your lab tests.    Low salt, low fat diet.  Eat 5 cups of vegetables, fruits and whole grains per day.  Limit starchy food (white rice, white bread, white pasta, white potatoes) to less than a cup per meal.  Minimize sweets, junk food and fastfood.  Exercise: moderate intensity sustained for at least 30 mins per episode, goal of 150 mins per week at least  Weight loss goal: 5-10 % of current weight in 4-6 months to start with, and gradually and incrementally so after that.    Prescriptions have been refilled.    Get a flu shot in October.      Preventive Health Recommendations  Male Ages 50 - 64    Yearly exam:             See your health care provider every year in order to  o   Review health changes.   o   Discuss preventive care.    o   Review your medicines if your doctor has prescribed any.     Have a cholesterol test every 5 years, or more frequently if you are at risk for high cholesterol/heart disease.     Have a diabetes test (fasting glucose) every three years. If you are at risk for diabetes, you should have this test more often.     Have a colonoscopy at age 50, or have a yearly FIT test (stool test). These exams will check for colon cancer.      Talk with your health care provider about whether or not a prostate cancer screening test (PSA) is right for  you.    You should be tested each year for STDs (sexually transmitted diseases), if you re at risk.     Shots: Get a flu shot each year. Get a tetanus shot every 10 years.     Nutrition:    Eat at least 5 servings of fruits and vegetables daily.     Eat whole-grain bread, whole-wheat pasta and brown rice instead of white grains and rice.     Talk to your provider about Calcium and Vitamin D.     Lifestyle    Exercise for at least 150 minutes a week (30 minutes a day, 5 days a week). This will help you control your weight and prevent disease.     Limit alcohol to one drink per day.     No smoking.     Wear sunscreen to prevent skin cancer.     See your dentist every six months for an exam and cleaning.     See your eye doctor every 1 to 2 years.            Follow-ups after your visit        Follow-up notes from your care team     Return in about 1 year (around 7/6/2018), or if symptoms worsen or fail to improve.      Who to contact     If you have questions or need follow up information about today's clinic visit or your schedule please contact [facility] directly at [phone].  Normal or non-critical lab and imaging results will be communicated to you by MyChart, letter or phone within 4 business days after the clinic has received the results. If you do not hear from us within 7 days, please contact the clinic through MyChart or phone. If you have a critical or abnormal lab result, we will notify you by phone as soon as possible.  Submit refill requests through [employer] or call your pharmacy and they will forward the refill request to us. Please allow 3 business days for your refill to be completed.          Additional Information About Your Visit        Care EveryWhere ID     This is your Care EveryWhere ID. This could be used by other organizations to access your [facility] medical records  [id number]        Your Vitals Were     Pulse Temperature Height BMI (Body Mass Index)          54 96.5  F  "(35.8  C) (Tympanic) 5' 9.75\" (1.772 m) 33.61 kg/m2         Blood Pressure from Last 3 Encounters:   07/06/17 116/66   05/17/17 125/72   02/27/17 139/82    Weight from Last 3 Encounters:   07/06/17 232 lb 9.6 oz (105.5 kg)   05/17/17 231 lb (104.8 kg)   02/27/17 237 lb (107.5 kg)              We Performed the Following     **Hepatitis C Screen Reflex to RNA FUTURE anytime     Basic metabolic panel  (Ca, Cl, CO2, Creat, Gluc, K, Na, BUN)     Lipid Profile with reflex to direct LDL          Where to get your medicines      These medications were sent to [address]  [address] [facility] [address]     Phone:  [phone]     lisinopril 5 MG tablet    metoprolol 50 MG tablet    rosuvastatin 20 MG tablet    triamterene-hydrochlorothiazide 37.5-25 MG per tablet          Primary Care Provider    Provider Unknown       No address on file        Equal Access to Services     GUY ROSALES : Hadii fernando reilly Sovidhi, waaxda luqadaha, qaybta kaalmada kelechi, rhiannon lindsay. So [facility] [phone].    ATENCIÓN: Si habla español, tiene a helms disposición servicios gratuitos de asistencia lingüística. Brie al [phone].    We comply with applicable federal civil rights laws and [place] laws. We do not discriminate on the basis of race, color, national origin, age, disability sex, sexual orientation or gender identity.            Thank you!     Thank you for choosing [facility]  for your care. Our goal is always to provide you with excellent care. Hearing back from our patients is one way we can continue to improve our services. Please take a few minutes to complete the written survey that you may receive in the mail after your visit with us. Thank you!             Your Updated Medication List - Protect others around you: Learn how to safely use, store and throw away your medicines at www.disposemymeds.org.          This " list is accurate as of: 7/6/17  7:37 AM.  Always use your most recent med list.                   Brand Name Dispense Instructions for use Diagnosis    aspirin 81 MG tablet      Take 1 tablet by mouth daily.        lisinopril 5 MG tablet    PRINIVIL/ZESTRIL    30 tablet    Take 1 tablet (5 mg) by mouth daily    Essential hypertension with goal blood pressure less than 140/90       metoprolol 50 MG tablet    LOPRESSOR    60 tablet    Take 1 tablet (50 mg) by mouth 2 times daily    Essential hypertension with goal blood pressure less than 140/90       rosuvastatin 20 MG tablet    CRESTOR    30 tablet    Take 1 tablet (20 mg) by mouth daily    Hyperlipidemia with target LDL less than 130       triamterene-hydrochlorothiazide 37.5-25 MG per tablet    MAXZIDE-25    30 tablet    Take 1 tablet by mouth daily    Essential hypertension with goal blood pressure less than 140/90

## 2018-01-16 ENCOUNTER — TELEPHONE (OUTPATIENT)
Dept: FAMILY MEDICINE | Facility: CLINIC | Age: 63
End: 2018-01-16

## 2018-01-16 DIAGNOSIS — Z12.11 SPECIAL SCREENING FOR MALIGNANT NEOPLASMS, COLON: Primary | ICD-10-CM

## 2018-01-16 NOTE — TELEPHONE ENCOUNTER
Reason for Call:  Other call back    Detailed comments: Pt would like order put in for routine colonoscopy. He plans to have it here at [place] if possible.    Phone Number Patient can be reached at: Home number on file [phone] (home)    Best Time: any        Call taken on 1/16/2018 at 2:13 PM by Qi Encarnacion

## 2018-02-22 ENCOUNTER — ANESTHESIA EVENT (OUTPATIENT)
Dept: GASTROENTEROLOGY | Facility: CLINIC | Age: 63
End: 2018-02-22
Payer: COMMERCIAL

## 2018-02-22 NOTE — ANESTHESIA PREPROCEDURE EVALUATION
Anesthesia Evaluation     . Pt has had prior anesthetic. Type: General           ROS/MED HX    ENT/Pulmonary:     (+)TY risk factors hypertension, , . .    Neurologic:  - neg neurologic ROS     Cardiovascular:     (+) Dyslipidemia, hypertension--CAD, -CABG-. : . . . :. . Previous cardiac testing Echodate:5/23/17results:Interpretation Summary  The Duke treadmill score was low risk ( >5 Cruz score).  Contrast was used without apparent complications. This was a normal stress  echocardiogram with no evidence of stress-induced ischemia.  _____________________________________________________________________________  __     Stress  There was a normal BP response to exercise.  Exercise was stopped due to fatigue.  A high workload was achieved.  Target Heart Rate was achieved.  The EKG portion of this stress test was negative for inducible ischemia (see  echo results below).  There was no chest pain or significant ST changes with exercise.  The Duke treadmill score was low risk ( >5 Duke score).  Normal resting wall motion and no stress-induced wall motion abnormality.  The visual ejection fraction is estimated at >70%.  Left ventricular cavity size decreases with exercise.  Global LV systolic function augments with exercise.     Baseline  Normal baseline electrocardiogram.  The patient is in normal sinus rhythm.  No regional wall motion abnormalities noted.  The visual ejection fraction is estimated at 55-60%.  No hemodynamically significant valvular abnormalities on 2D or color flow  imaging.     Stress Results             Protocol:  Lobo          Maximum Predicted HR:   161 bpm             Target HR: 137 bpm        % Maximum Predicted HR: 105 %               Stage DurationHeart Rate  BP             Comment                   (mm:ss)   (bpm)           Stage 1  3:00      120   158/70           Stage 2  3:00      130   162/80           Stage 3  3:00      146   164/80           Stage 4  2:37      169      /  RPP 71009; FAC  Above; Cruz 10          Recovery  6:00      105   142/88                       Stress Duration:   10:37 mm:ss *                 Maximum Stress HR: 169 bpm *            METS: 12     Aortic Valve  No aortic regurgitation is present.date: results:ECG reviewed date:5/17/17 results:Sinus Bradycardia   WITHIN NORMAL LIMITS date: results:          METS/Exercise Tolerance:     Hematologic:  - neg hematologic  ROS       Musculoskeletal:  - neg musculoskeletal ROS       GI/Hepatic:  - neg GI/hepatic ROS       Renal/Genitourinary:  - ROS Renal section negative       Endo:     (+) Obesity, .      Psychiatric:  - neg psychiatric ROS       Infectious Disease:         Malignancy:      - no malignancy   Other:    - neg other ROS                 Physical Exam  Normal systems: cardiovascular, pulmonary and dental    Airway   Mallampati: II  TM distance: >3 FB  Neck ROM: full    Dental     Cardiovascular       Pulmonary                     Anesthesia Plan      History & Physical Review  History and physical reviewed and following examination; no interval change.    ASA Status:  3 .    NPO Status:  > 4 hours    Plan for MAC with Propofol induction. Reason for MAC:  Deep or markedly invasive procedure (G8)         Postoperative Care      Consents  Anesthetic plan, risks, benefits and alternatives discussed with:  Patient..                          .

## 2018-02-23 ENCOUNTER — SURGERY (OUTPATIENT)
Age: 63
End: 2018-02-23

## 2018-02-23 ENCOUNTER — HOSPITAL ENCOUNTER (OUTPATIENT)
Facility: CLINIC | Age: 63
Discharge: HOME | End: 2018-02-23
Attending: SURGERY | Admitting: SURGERY
Payer: COMMERCIAL

## 2018-02-23 ENCOUNTER — ANESTHESIA (OUTPATIENT)
Dept: GASTROENTEROLOGY | Facility: CLINIC | Age: 63
End: 2018-02-23
Payer: COMMERCIAL

## 2018-02-23 VITALS
TEMPERATURE: 97.6 F | DIASTOLIC BLOOD PRESSURE: 97 MMHG | WEIGHT: 225 LBS | OXYGEN SATURATION: 93 % | SYSTOLIC BLOOD PRESSURE: 141 MMHG | BODY MASS INDEX: 32.21 KG/M2 | HEART RATE: 79 BPM | RESPIRATION RATE: 15 BRPM | HEIGHT: 70 IN

## 2018-02-23 LAB — COLONOSCOPY: NORMAL

## 2018-02-23 PROCEDURE — 37000008 ZZH ANESTHESIA TECHNICAL FEE, 1ST 30 MIN: Performed by: SURGERY

## 2018-02-23 PROCEDURE — G0121 COLON CA SCRN NOT HI RSK IND: HCPCS | Performed by: SURGERY

## 2018-02-23 PROCEDURE — 45378 DIAGNOSTIC COLONOSCOPY: CPT | Performed by: SURGERY

## 2018-02-23 PROCEDURE — 25000125 ZZHC RX 250: Performed by: NURSE ANESTHETIST, CERTIFIED REGISTERED

## 2018-02-23 PROCEDURE — 25000128 H RX IP 250 OP 636: Performed by: SURGERY

## 2018-02-23 PROCEDURE — 25000128 H RX IP 250 OP 636: Performed by: NURSE ANESTHETIST, CERTIFIED REGISTERED

## 2018-02-23 RX ORDER — PROPOFOL 10 MG/ML
INJECTION, EMULSION INTRAVENOUS PRN
Status: DISCONTINUED | OUTPATIENT
Start: 2018-02-23 | End: 2018-02-23

## 2018-02-23 RX ORDER — ONDANSETRON 2 MG/ML
4 INJECTION INTRAMUSCULAR; INTRAVENOUS
Status: DISCONTINUED | OUTPATIENT
Start: 2018-02-23 | End: 2018-02-23 | Stop reason: HOSPADM

## 2018-02-23 RX ORDER — LIDOCAINE 40 MG/G
CREAM TOPICAL
Status: DISCONTINUED | OUTPATIENT
Start: 2018-02-23 | End: 2018-02-23 | Stop reason: HOSPADM

## 2018-02-23 RX ORDER — SODIUM CHLORIDE, SODIUM LACTATE, POTASSIUM CHLORIDE, CALCIUM CHLORIDE 600; 310; 30; 20 MG/100ML; MG/100ML; MG/100ML; MG/100ML
INJECTION, SOLUTION INTRAVENOUS CONTINUOUS
Status: DISCONTINUED | OUTPATIENT
Start: 2018-02-23 | End: 2018-02-23 | Stop reason: HOSPADM

## 2018-02-23 RX ORDER — PROPOFOL 10 MG/ML
INJECTION, EMULSION INTRAVENOUS CONTINUOUS PRN
Status: DISCONTINUED | OUTPATIENT
Start: 2018-02-23 | End: 2018-02-23

## 2018-02-23 RX ADMIN — PROPOFOL 50 MG: 10 INJECTION, EMULSION INTRAVENOUS at 10:35

## 2018-02-23 RX ADMIN — SODIUM CHLORIDE, POTASSIUM CHLORIDE, SODIUM LACTATE AND CALCIUM CHLORIDE: 600; 310; 30; 20 INJECTION, SOLUTION INTRAVENOUS at 09:44

## 2018-02-23 RX ADMIN — LIDOCAINE HYDROCHLORIDE 5 ML: 10 INJECTION, SOLUTION EPIDURAL; INFILTRATION; INTRACAUDAL; PERINEURAL at 09:53

## 2018-02-23 RX ADMIN — SODIUM CHLORIDE, POTASSIUM CHLORIDE, SODIUM LACTATE AND CALCIUM CHLORIDE: 600; 310; 30; 20 INJECTION, SOLUTION INTRAVENOUS at 09:53

## 2018-02-23 RX ADMIN — PROPOFOL 200 MCG/KG/MIN: 10 INJECTION, EMULSION INTRAVENOUS at 10:35

## 2018-02-23 NOTE — ANESTHESIA CARE TRANSFER NOTE
Patient: Quang Gillespie    Procedure(s):  colonoscopy - Wound Class: II-Clean Contaminated    Diagnosis: screening  Diagnosis Additional Information: No value filed.    Anesthesia Type:   MAC     Note:  Airway :Room Air  Patient transferred to:Phase II  Handoff Report: Identifed the Patient, Identified the Reponsible Provider, Reviewed the pertinent medical history, Discussed the surgical course, Reviewed Intra-OP anesthesia mangement and issues during anesthesia, Set expectations for post-procedure period and Allowed opportunity for questions and acknowledgement of understanding      Vitals: (Last set prior to Anesthesia Care Transfer)    CRNA VITALS  2/23/2018 1018 - 2/23/2018 1048      2/23/2018             Pulse: 83    Ht Rate: 82    SpO2: 96 %                Electronically Signed By: WICHO Moser CRNA  February 23, 2018  10:48 AM

## 2018-02-23 NOTE — BRIEF OP NOTE
[facility]   Brief Operative Note    Pre-operative diagnosis: screening   Post-operative diagnosis normal colon   Procedure: Procedure(s):  colonoscopy - Wound Class: II-Clean Contaminated   Surgeon(s): Surgeon(s) and Role:     * Daniel Caldera MD - Primary   Estimated blood loss: * No values recorded between 2/23/2018 12:00 AM and 2/23/2018 10:49 AM *    Specimens: * No specimens in log *   Findings: Normal colon

## 2018-02-23 NOTE — H&P
"62 year old year old male here for colonoscopy for screening.    Patient Active Problem List   Diagnosis     Hyperlipidemia with target LDL less than 130     Hypertension goal BP (blood pressure) < 140/90     CAD (coronary artery disease)     Advanced directives, counseling/discussion     Impacted cerumen of both ears     Non morbid obesity due to excess calories       Past Medical History:   Diagnosis Date     Hyperlipidemia LDL goal < 130      Hypertension        Past Surgical History:   Procedure Laterality Date     [facility]      [facility]    No current outpatient prescriptions on file.       No Known Allergies    Pt reports that he has never smoked. He has never used smokeless tobacco. He reports that he drinks alcohol. He reports that he does not use illicit drugs.    Exam:  BP (!) 129/94 (BP Location: Right arm)  Pulse 84  Temp 97.6  F (36.4  C) (Oral)  Resp 18  Ht 1.778 m (5' 10\")  Wt 102.1 kg (225 lb)  SpO2 94%  BMI 32.28 kg/m2    Awake, Alert OX3  Lungs - CTA bilaterally  CV - RRR, no murmurs, distal pulses intact  Abd - soft, non-distended, non-tender, +BS  Extr - No cyanosis or edema    A/P 62 year old year old male in need of colonoscopy for screening. Risks, benefits, alternatives, and complications were discussed including the possibility of perforation and the patient agreed to proceed    Daniel Caldera MD   "

## 2018-02-23 NOTE — ANESTHESIA POSTPROCEDURE EVALUATION
Patient: Quang Gillespie    Procedure(s):  colonoscopy - Wound Class: II-Clean Contaminated    Diagnosis:screening  Diagnosis Additional Information: No value filed.    Anesthesia Type:  MAC    Note:  Anesthesia Post Evaluation    Patient location during evaluation: Bedside  Patient participation: Able to fully participate in evaluation  Level of consciousness: awake  Pain management: adequate  Airway patency: patent  Cardiovascular status: acceptable  Respiratory status: acceptable  Hydration status: stable  PONV: none     Anesthetic complications: None          Last vitals:  Vitals:    02/23/18 0931   BP: (!) 129/94   Pulse: 84   Resp: 18   Temp: 36.4  C (97.6  F)   SpO2: 94%         Electronically Signed By: WICHO Moser CRNA  February 23, 2018  10:48 AM

## 2018-06-09 ENCOUNTER — HOSPITAL ENCOUNTER (EMERGENCY)
Facility: CLINIC | Age: 63
Discharge: HOME | End: 2018-06-09
Attending: PHYSICIAN ASSISTANT | Admitting: PHYSICIAN ASSISTANT
Payer: COMMERCIAL

## 2018-06-09 VITALS
SYSTOLIC BLOOD PRESSURE: 124 MMHG | OXYGEN SATURATION: 99 % | TEMPERATURE: 97.8 F | DIASTOLIC BLOOD PRESSURE: 76 MMHG | HEART RATE: 65 BPM | RESPIRATION RATE: 20 BRPM

## 2018-06-09 DIAGNOSIS — S61.411A LACERATION OF RIGHT HAND WITHOUT FOREIGN BODY, INITIAL ENCOUNTER: ICD-10-CM

## 2018-06-09 PROCEDURE — 25000128 H RX IP 250 OP 636: Performed by: PHYSICIAN ASSISTANT

## 2018-06-09 PROCEDURE — 90471 IMMUNIZATION ADMIN: CPT

## 2018-06-09 PROCEDURE — 90715 TDAP VACCINE 7 YRS/> IM: CPT | Performed by: PHYSICIAN ASSISTANT

## 2018-06-09 PROCEDURE — 12001 RPR S/N/AX/GEN/TRNK 2.5CM/<: CPT

## 2018-06-09 PROCEDURE — G0463 HOSPITAL OUTPT CLINIC VISIT: HCPCS | Mod: 25

## 2018-06-09 PROCEDURE — 99213 OFFICE O/P EST LOW 20 MIN: CPT | Performed by: PHYSICIAN ASSISTANT

## 2018-06-09 RX ADMIN — CLOSTRIDIUM TETANI TOXOID ANTIGEN (FORMALDEHYDE INACTIVATED), CORYNEBACTERIUM DIPHTHERIAE TOXOID ANTIGEN (FORMALDEHYDE INACTIVATED), BORDETELLA PERTUSSIS TOXOID ANTIGEN (GLUTARALDEHYDE INACTIVATED), BORDETELLA PERTUSSIS FILAMENTOUS HEMAGGLUTININ ANTIGEN (FORMALDEHYDE INACTIVATED), BORDETELLA PERTUSSIS PERTACTIN ANTIGEN, AND BORDETELLA PERTUSSIS FIMBRIAE 2/3 ANTIGEN 0.5 ML: 5; 2; 2.5; 5; 3; 5 INJECTION, SUSPENSION INTRAMUSCULAR at 14:56

## 2018-06-09 ASSESSMENT — ENCOUNTER SYMPTOMS: WOUND: 1

## 2018-06-09 NOTE — DISCHARGE INSTRUCTIONS
After care instructions:  Keep wound clean and dry for the next 24-48 hours  Sutures out in 10 days  Signs of infection discussed today  May return to work as long as wound is kept clean and dry  Discussed the probability of scarring    Tetanus given in office today    Follow up with PCP or return to care in 10 days for suture removal.    Return to clinic sooner or go to Emergency Room if symptoms worsen or change or signs of infection occur (redness, red streaking, warmth, increased pain, increased swelling, purulent drainage, or fevers occur.)    May use acetaminophen prn.    Patient voiced understanding of instructions given.

## 2018-06-09 NOTE — ED PROVIDER NOTES
History     Chief Complaint   Patient presents with     Laceration     on blood thinners     HPI    Quang Gillespie is a 62 year old male who presents to the clinic with a laceration on the right hand sustained 1 hours ago.  This is a non-work related injury.  Mechanism of injury: patient was fixing his dock and bumped his hand on a dock bracket and cut it.  Patient denies any foreign body, bony injury, tendon injury, or submersion[place].    Associated symptoms: Denies numbness, weakness, or loss of function  Last tetanus booster given in office today.     Patient declined x-ray in office today.     Problem list, Medication list, Allergies, and Medical/Social/Surgical histories reviewed in [facility] and updated as appropriate.      Problem List:    Patient Active Problem List    Diagnosis Date Noted     Non morbid obesity due to excess calories 06/13/2016     Priority: Medium     Impacted cerumen of both ears 06/06/2016     Priority: Medium     Advanced directives, counseling/discussion 03/18/2015     Priority: Medium     CAD (coronary artery disease) 01/07/2011     Priority: Medium     Hypertension goal BP (blood pressure) < 140/90 12/22/2010     Priority: Medium     Hyperlipidemia with target LDL less than 130      Priority: Medium     Diagnosis updated by automated process. Provider to review and confirm.          Past Medical History:    Past Medical History:   Diagnosis Date     Heart disease      Hyperlipidemia LDL goal < 130      Hypertension        Past Surgical History:    Past Surgical History:   Procedure Laterality Date     CARDIAC SURGERY      1 stentplaced     COLONOSCOPY N/A 2/23/2018    Procedure: COLONOSCOPY;  colonoscopy;  Surgeon: Daniel Caldera MD;  [facility]       Family History:    Family History   Problem Relation Age of Onset     CANCER Mother      brain tumor     C.A.D. Mother      Alcohol/Drug Mother      smoker     CANCER Father      hodgkins     Alcohol/Drug  Father      smoker       Social History:  Marital Status:   [2]  Social History   Substance Use Topics     Smoking status: Never Smoker     Smokeless tobacco: Never Used      Comment: Never smoker; no secondhand smoke exposure     Alcohol use Yes      Comment: 2 drinks per day        Medications:      aspirin 81 MG tablet   lisinopril (PRINIVIL/ZESTRIL) 5 MG tablet   metoprolol (LOPRESSOR) 50 MG tablet   rosuvastatin (CRESTOR) 20 MG tablet   triamterene-hydrochlorothiazide (MAXZIDE-25) 37.5-25 MG per tablet         Review of Systems   Skin: Positive for wound (right hand laceration. ).       Physical Exam   BP: 124/76  Pulse: 65  Temp: 97.8  F (36.6  C)  Resp: 20  SpO2: 99 %      Physical Exam   Constitutional: He is oriented to person, place, and time. He appears well-developed and well-nourished. No distress.   Cardiovascular: Normal rate, regular rhythm, normal heart sounds and intact distal pulses.    Pulmonary/Chest: Effort normal and breath sounds normal.   Musculoskeletal: Normal range of motion.        Right hand: He exhibits tenderness and laceration. He exhibits normal range of motion, no bony tenderness, normal two-point discrimination, normal capillary refill, no deformity and no swelling. Normal sensation noted. Normal strength noted.        Hands:  Patient offered x-ray in office today and declined.   Neurological: He is alert and oriented to person, place, and time. He has normal strength. No cranial nerve deficit or sensory deficit. Gait normal. GCS eye subscore is 4. GCS verbal subscore is 5. GCS motor subscore is 6.   Skin: Skin is warm and dry. Laceration (to right hand. ) noted. He is not diaphoretic. No erythema.   Psychiatric: He has a normal mood and affect. His behavior is normal. Judgment and thought content normal.                ED Course     ED Course     Laceration repair  Date/Time: 6/9/2018 9:43 PM  Performed by: MIK SHELTON  Authorized by: MIK SHELTON    Consent: Verbal consent obtained.  Risks and benefits: risks, benefits and alternatives were discussed  Consent given by: patient  Patient identity confirmed: verbally with patient  Laceration length: 1.5 cm  Foreign bodies: no foreign bodies  Tendon involvement: none  Nerve involvement: none  Vascular damage: no  Anesthesia: local infiltration    Anesthesia:  Local Anesthetic: lidocaine 1% without epinephrine  Anesthetic total: 2 mL    Sedation:  Patient sedated: no  Preparation: Patient was prepped and draped in the usual sterile fashion.  Irrigation solution: saline (hibiclens )  Irrigation method: syringe  Amount of cleaning: extensive  Debridement: none  Degree of undermining: none  Skin closure: 5-0 nylon and Steri-Strips  Number of sutures: 4  Technique: simple  Approximation: close  Approximation difficulty: simple  Dressing: antibiotic ointment and 4x4 sterile gauze  Patient tolerance: Patient tolerated the procedure well with no immediate complications                    Critical Care time:  none               No results found for this or any previous visit (from the past 24 hour(s)).    Medications   Tdap (tetanus-diphtheria-acell pertussis) (ADACEL) injection 0.5 mL (0.5 mLs Intramuscular Given 6/9/18 1456)       Assessments & Plan (with Medical Decision Making)     I have reviewed the nursing notes.    I have reviewed the findings, diagnosis, plan and need for follow up with the patient.    After care instructions:  Keep wound clean and dry for the next 24-48 hours  Sutures out in 10 days  Signs of infection discussed today  May return to work as long as wound is kept clean and dry  Discussed the probability of scarring    Tetanus given in office today    Follow up with PCP or return to care in 10 days for suture removal.    Return to clinic sooner or go to Emergency Room if symptoms worsen or change or signs of infection occur (redness, red streaking, warmth, increased pain, increased swelling,  purulent drainage, or fevers occur.)    May use acetaminophen prn.    Patient voiced understanding of instructions given.            Discharge Medication List as of 6/9/2018  3:26 PM          Final diagnoses:   Laceration of right hand without foreign body, initial encounter       6/9/2018   [facility] EMERGENCY DEPARTMENT     [place] PA-C  06/09/18 3392

## 2018-06-09 NOTE — ED AVS SNAPSHOT
[facility] Emergency Department    [address] [address]    Phone:  [phone]    Fax:  [phone]                                       Quang Gillespie   MRN: [id number]    Department:  [facility] Emergency Department   Date of Visit:  6/9/2018           Patient Information     Date Of Birth          1955        Your diagnoses for this visit were:     Laceration of right hand without foreign body, initial encounter        You were seen by Mague Villa PA-C.      Follow-up Information     Follow up with Rafael Velasco MD In 10 days.    Specialty:  Family Practice    Why:  For suture removal    Contact information:    [address] [id number] [phone]          Discharge Instructions       After care instructions:  Keep wound clean and dry for the next 24-48 hours  Sutures out in 10 days  Signs of infection discussed today  May return to work as long as wound is kept clean and dry  Discussed the probability of scarring    Tetanus given in office today    Follow up with PCP or return to care in 10 days for suture removal.    Return to clinic sooner or go to Emergency Room if symptoms worsen or change or signs of infection occur (redness, red streaking, warmth, increased pain, increased swelling, purulent drainage, or fevers occur.)    May use acetaminophen prn.    Patient voiced understanding of instructions given.            24 Hour Appointment Hotline       To make an appointment at any [facility], call [phone] ([phone]). If you don't have a family doctor or clinic, we will help you find one. [facility] are conveniently located to serve the needs of you and your family.             Review of your medicines      Our records show that you are taking the medicines listed below. If these are incorrect, please call your family doctor or clinic.        Dose / Directions Last dose taken    aspirin 81 MG  tablet   Dose:  1 tablet        Take 1 tablet by mouth daily.   Refills:  0        lisinopril 5 MG tablet   Commonly known as:  PRINIVIL/ZESTRIL   Dose:  5 mg   Quantity:  30 tablet        Take 1 tablet (5 mg) by mouth daily   Refills:  11        metoprolol tartrate 50 MG tablet   Commonly known as:  LOPRESSOR   Dose:  50 mg   Quantity:  60 tablet        Take 1 tablet (50 mg) by mouth 2 times daily   Refills:  11        rosuvastatin 20 MG tablet   Commonly known as:  CRESTOR   Dose:  20 mg   Quantity:  30 tablet        Take 1 tablet (20 mg) by mouth daily   Refills:  11        triamterene-hydrochlorothiazide 37.5-25 MG per tablet   Commonly known as:  MAXZIDE-25   Dose:  1 tablet   Quantity:  30 tablet        Take 1 tablet by mouth daily   Refills:  11                Orders Needing Specimen Collection     None      Pending Results     No orders found from 6/7/2018 to 6/10/2018.            Pending Culture Results     No orders found from 6/7/2018 to 6/10/2018.            Pending Results Instructions     If you had any lab results that were not finalized at the time of your Discharge, you can call the ED Lab Result RN at [phone]. You will be contacted by this team for any positive Lab results or changes in treatment. The nurses are available 7 days a week from 10A to 6:30P.  You can leave a message 24 hours per day and they will return your call.        Test Results From Your Hospital Stay               Thank you for choosing [facility]       Thank you for choosing [facility] for your care. Our goal is always to provide you with excellent care. Hearing back from our patients is one way we can continue to improve our services. Please take a few minutes to complete the written survey that you may receive in the mail after you visit with us. Thank you!        [employer] Information     [employer] lets you send messages to your doctor, view your test results, renew your prescriptions, schedule appointments and more. To sign  "up, go to [email or web address] . Click on \"Log in\" on the left side of the screen, which will take you to the Welcome page. Then click on \"Sign up Now\" on the right side of the page.     You will be asked to enter the access code listed below, as well as some personal information. Please follow the directions to create your username and password.     Your access code is: [email or web address]  Expires: 2018  3:26 PM     Your access code will  in 90 days. If you need help or a new code, please call your [place] clinic or [phone].        Care EveryWhere ID     This is your Care EveryWhere ID. This could be used by other organizations to access your [place] medical records  [id number]        Equal Access to Services     ANTONY ROSALES : Hermelinda San, joaquín irving, armin lorenz, rhiannon lindsay. So [facility] [phone].    ATENCIÓN: Si habla español, tiene a helms disposición servicios gratuitos de asistencia lingüística. Llame al [phone].    We comply with applicable federal civil rights laws and [place] laws. We do not discriminate on the basis of race, color, national origin, age, disability, sex, sexual orientation, or gender identity.            After Visit Summary       This is your record. Keep this with you and show to your community pharmacist(s) and doctor(s) at your next visit.                  "

## 2018-06-09 NOTE — ED AVS SNAPSHOT
[facility] Emergency Department    [address] [address]    Phone:  [phone]    Fax:  [phone]                                       Quang Gillespie   MRN: [id number]    Department:  [facility] Emergency Department   Date of Visit:  6/9/2018           After Visit Summary Signature Page     I have received my discharge instructions, and my questions have been answered. I have discussed any challenges I see with this plan with the nurse or doctor.    ..........................................................................................................................................  Patient/Patient Representative Signature      ..........................................................................................................................................  Patient Representative Print Name and Relationship to Patient    ..................................................               ................................................  Date                                            Time    ..........................................................................................................................................  Reviewed by Signature/Title    ...................................................              ..............................................  Date                                                            Time

## 2018-07-23 DIAGNOSIS — I10 ESSENTIAL HYPERTENSION WITH GOAL BLOOD PRESSURE LESS THAN 140/90: ICD-10-CM

## 2018-07-23 DIAGNOSIS — E78.5 HYPERLIPIDEMIA WITH TARGET LDL LESS THAN 130: ICD-10-CM

## 2018-07-23 NOTE — TELEPHONE ENCOUNTER
"Requested Prescriptions   Pending Prescriptions Disp Refills     rosuvastatin (CRESTOR) 20 MG tablet [Pharmacy Med Name: ROSUVASTATIN CALCIUM 20 MG 20 TAB]  Last Written Prescription Date:  07/06/17  Last Fill Quantity: 30,  # refills: 11   Last office visit: 7/6/2017 with prescribing provider:  07/06/17   Future Office Visit:     30 tablet 11     Sig: TAKE 1 TABLET (20 MG) BY MOUTH DAILY    Statins Protocol Failed    7/23/2018 12:49 PM       Failed - LDL on file in past 12 months    Recent Labs   Lab Test  07/06/17   0657   LDL  90          Failed - Recent (12 mo) or future (30 days) visit within the authorizing provider's specialty    Patient had office visit in the last 12 months or has a visit in the next 30 days with authorizing provider or within the authorizing provider's specialty.  See \"Patient Info\" tab in inbasket, or \"Choose Columns\" in Meds & Orders section of the refill encounter.           Passed - No abnormal creatine kinase in past 12 months    No lab results found.        Passed - Patient is age 18 or older        metoprolol tartrate (LOPRESSOR) 50 MG tablet [Pharmacy Med Name: METOPROLOL TART 50MG TAB 50 TAB]  Last Written Prescription Date:  07/6/17  Last Fill Quantity: 60,  # refills: 11   Last office visit: 7/6/2017 with prescribing provider:  07/06/17   Future Office Visit:   60 tablet 11     Sig: TAKE 1 TABLET (50 MG) BY MOUTH 2 TIMES DAILY    Beta-Blockers Protocol Failed    7/23/2018 12:49 PM       Failed - Recent (12 mo) or future (30 days) visit within the authorizing provider's specialty    Patient had office visit in the last 12 months or has a visit in the next 30 days with authorizing provider or within the authorizing provider's specialty.  See \"Patient Info\" tab in inbasket, or \"Choose Columns\" in Meds & Orders section of the refill encounter.           Passed - Blood pressure under 140/90 in past 12 months    BP Readings from Last 3 Encounters:   06/09/18 124/76   02/23/18 (!) " "141/97 07/06/17 116/66          Passed - Patient is age 6 or older        lisinopril (PRINIVIL/ZESTRIL) 5 MG tablet [Pharmacy Med Name: LISINOPRIL 5 MG TABLET 5 TAB]  Last Written Prescription Date:  07/06/17  Last Fill Quantity: 30,  # refills: 11   Last office visit: 7/6/2017 with prescribing provider:  07/06/17   Future Office Visit:     30 tablet 11     Sig: TAKE 1 TABLET (5 MG) BY MOUTH DAILY    ACE Inhibitors (Including Combos) Protocol Failed    7/23/2018 12:49 PM       Failed - Recent (12 mo) or future (30 days) visit within the authorizing provider's specialty    Patient had office visit in the last 12 months or has a visit in the next 30 days with authorizing provider or within the authorizing provider's specialty.  See \"Patient Info\" tab in inbasket, or \"Choose Columns\" in Meds & Orders section of the refill encounter.           Failed - Normal serum creatinine on file in past 12 months    Recent Labs   Lab Test  07/06/17   0657   CR  0.92          Failed - Normal serum potassium on file in past 12 months    Recent Labs   Lab Test  07/06/17   0657   POTASSIUM  3.8          Passed - Blood pressure under 140/90 in past 12 months    BP Readings from Last 3 Encounters:   06/09/18 124/76   02/23/18 (!) 141/97   07/06/17 116/66          Passed - Patient is age 18 or older        triamterene-hydrochlorothiazide (MAXZIDE-25) 37.5-25 MG per tablet [Pharmacy Med Name: TRIAMTERENE-HCTZ 37.5-25 MG 37.5-25 TAB]  Last Written Prescription Date:  07/06/17  Last Fill Quantity: 30,  # refills: 11   Last office visit: 7/6/2017 with prescribing provider:  07/06/17   Future Office Visit:     30 tablet 11     Sig: TAKE 1 TABLET BY MOUTH DAILY    Diuretics (Including Combos) Protocol Failed    7/23/2018 12:49 PM       Failed - Recent (12 mo) or future (30 days) visit within the authorizing provider's specialty    Patient had office visit in the last 12 months or has a visit in the next 30 days with authorizing provider or " "within the authorizing provider's specialty.  See \"Patient Info\" tab in inbasket, or \"Choose Columns\" in Meds & Orders section of the refill encounter.           Failed - Normal serum creatinine on file in past 12 months    Recent Labs   Lab Test  07/06/17   0657   CR  0.92          Failed - Normal serum potassium on file in past 12 months    Recent Labs   Lab Test  07/06/17   0657   POTASSIUM  3.8          Failed - Normal serum sodium on file in past 12 months    Recent Labs   Lab Test  07/06/17   0657   NA  140          Passed - Blood pressure under 140/90 in past 12 months    BP Readings from Last 3 Encounters:   06/09/18 124/76   02/23/18 (!) 141/97   07/06/17 116/66          Passed - Patient is age 18 or older          "

## 2018-07-25 NOTE — TELEPHONE ENCOUNTER
Needs OV and labs - LOV 7/2017.  Left message for patient to return call to clinic.  Dyana FLORES RN

## 2018-07-26 RX ORDER — LISINOPRIL 5 MG/1
TABLET ORAL
Qty: 30 TABLET | Refills: 0 | Status: SHIPPED | OUTPATIENT
Start: 2018-07-26 | End: 2018-07-31

## 2018-07-26 RX ORDER — ROSUVASTATIN CALCIUM 20 MG/1
TABLET, COATED ORAL
Qty: 30 TABLET | Refills: 0 | Status: SHIPPED | OUTPATIENT
Start: 2018-07-26 | End: 2018-07-31

## 2018-07-26 RX ORDER — METOPROLOL TARTRATE 50 MG
TABLET ORAL
Qty: 60 TABLET | Refills: 0 | Status: SHIPPED | OUTPATIENT
Start: 2018-07-26 | End: 2018-07-31

## 2018-07-26 RX ORDER — TRIAMTERENE/HYDROCHLOROTHIAZID 37.5-25 MG
TABLET ORAL
Qty: 30 TABLET | Refills: 0 | Status: SHIPPED | OUTPATIENT
Start: 2018-07-26 | End: 2018-07-31

## 2018-07-26 NOTE — TELEPHONE ENCOUNTER
contacted patient scheduled for 7/31/18 at 0720 for fasting labs.   Medication is being filled for 1 time refill only due to:  Patient needs to be seen because it has been more than one year since last visit.

## 2018-07-31 ENCOUNTER — OFFICE VISIT (OUTPATIENT)
Dept: FAMILY MEDICINE | Facility: CLINIC | Age: 63
End: 2018-07-31
Payer: COMMERCIAL

## 2018-07-31 VITALS
OXYGEN SATURATION: 94 % | HEART RATE: 50 BPM | SYSTOLIC BLOOD PRESSURE: 124 MMHG | TEMPERATURE: 97.4 F | HEIGHT: 70 IN | WEIGHT: 221.6 LBS | DIASTOLIC BLOOD PRESSURE: 70 MMHG | BODY MASS INDEX: 31.73 KG/M2

## 2018-07-31 DIAGNOSIS — Z11.4 SCREENING FOR HUMAN IMMUNODEFICIENCY VIRUS: ICD-10-CM

## 2018-07-31 DIAGNOSIS — E66.09 NON MORBID OBESITY DUE TO EXCESS CALORIES: ICD-10-CM

## 2018-07-31 DIAGNOSIS — I10 ESSENTIAL HYPERTENSION WITH GOAL BLOOD PRESSURE LESS THAN 140/90: ICD-10-CM

## 2018-07-31 DIAGNOSIS — I25.10 CORONARY ARTERY DISEASE INVOLVING NATIVE CORONARY ARTERY OF NATIVE HEART WITHOUT ANGINA PECTORIS: ICD-10-CM

## 2018-07-31 DIAGNOSIS — E78.5 HYPERLIPIDEMIA WITH TARGET LDL LESS THAN 130: ICD-10-CM

## 2018-07-31 DIAGNOSIS — Z00.00 ENCOUNTER FOR ROUTINE ADULT HEALTH EXAMINATION WITHOUT ABNORMAL FINDINGS: Primary | ICD-10-CM

## 2018-07-31 LAB
ANION GAP SERPL CALCULATED.3IONS-SCNC: 5 MMOL/L (ref 3–14)
BUN SERPL-MCNC: 18 MG/DL (ref 7–30)
CALCIUM SERPL-MCNC: 8.6 MG/DL (ref 8.5–10.1)
CHLORIDE SERPL-SCNC: 101 MMOL/L (ref 94–109)
CHOLEST SERPL-MCNC: 150 MG/DL
CO2 SERPL-SCNC: 30 MMOL/L (ref 20–32)
CREAT SERPL-MCNC: 0.96 MG/DL (ref 0.66–1.25)
GFR SERPL CREATININE-BSD FRML MDRD: 80 ML/MIN/1.7M2
GLUCOSE SERPL-MCNC: 104 MG/DL (ref 70–99)
HDLC SERPL-MCNC: 51 MG/DL
HIV 1+2 AB+HIV1 P24 AG SERPL QL IA: NONREACTIVE
LDLC SERPL CALC-MCNC: 81 MG/DL
NONHDLC SERPL-MCNC: 99 MG/DL
POTASSIUM SERPL-SCNC: 3.9 MMOL/L (ref 3.4–5.3)
SODIUM SERPL-SCNC: 136 MMOL/L (ref 133–144)
TRIGL SERPL-MCNC: 91 MG/DL

## 2018-07-31 PROCEDURE — 36415 COLL VENOUS BLD VENIPUNCTURE: CPT | Performed by: FAMILY MEDICINE

## 2018-07-31 PROCEDURE — 80048 BASIC METABOLIC PNL TOTAL CA: CPT | Performed by: FAMILY MEDICINE

## 2018-07-31 PROCEDURE — 80061 LIPID PANEL: CPT | Performed by: FAMILY MEDICINE

## 2018-07-31 PROCEDURE — 99396 PREV VISIT EST AGE 40-64: CPT | Performed by: FAMILY MEDICINE

## 2018-07-31 PROCEDURE — 87389 HIV-1 AG W/HIV-1&-2 AB AG IA: CPT | Performed by: FAMILY MEDICINE

## 2018-07-31 RX ORDER — ROSUVASTATIN CALCIUM 20 MG/1
20 TABLET, COATED ORAL DAILY
Qty: 90 TABLET | Refills: 3 | Status: SHIPPED | OUTPATIENT
Start: 2018-07-31 | End: 2019-08-16

## 2018-07-31 RX ORDER — TRIAMTERENE/HYDROCHLOROTHIAZID 37.5-25 MG
1 TABLET ORAL DAILY
Qty: 90 TABLET | Refills: 3 | Status: SHIPPED | OUTPATIENT
Start: 2018-07-31 | End: 2019-08-16

## 2018-07-31 RX ORDER — LISINOPRIL 5 MG/1
5 TABLET ORAL DAILY
Qty: 90 TABLET | Refills: 3 | Status: SHIPPED | OUTPATIENT
Start: 2018-07-31 | End: 2019-08-16

## 2018-07-31 RX ORDER — METOPROLOL TARTRATE 50 MG
50 TABLET ORAL 2 TIMES DAILY
Qty: 180 TABLET | Refills: 3 | Status: SHIPPED | OUTPATIENT
Start: 2018-07-31 | End: 2019-08-16

## 2018-07-31 NOTE — MR AVS SNAPSHOT
After Visit Summary   7/31/2018    Quang Gillespie    MRN: [id number]           Patient Information     Date Of Birth          1955        Visit Information        Provider Department      7/31/2018 7:20 AM Rafael Velasco MD [facility]        Today's Diagnoses     Encounter for routine adult health examination without abnormal findings    -  1    Hyperlipidemia with target LDL less than 130        Essential hypertension with goal blood pressure less than 140/90        Screening for human immunodeficiency virus        Non morbid obesity due to excess calories        Coronary artery disease involving native coronary artery of native heart without angina pectoris          Care Instructions    You will be contacted in 1-2 days for results of your lab tests.    Be consistent with low salt,  low trans fat and saturated fat diet.  Eat food rich in omega-3-fatty acids as you tolerate. (salmon, olive oil)  Eat 5 cups of vegetables, fruits and whole grains per day.  Limit starchy food (white rice, white bread, white pasta, white potatoes) to less than a cup per meal.  Minimize sweets, junk food and fastfood. Limit soda beverages to one serving per day; best to avoid it altogether though.  Exercise: moderate intensity sustained for at least 30 mins per episode, goal of 150 mins per week at least  Combine cardiovascular and resistance exercises.  These exercise recommendations are in addition to your daily activity at work or home.  Work on losing weight.    Get a flu shot every October.    Schedule preventive eye exam soon!      Preventive Health Recommendations  Male Ages 50 - 64    Yearly exam:             See your health care provider every year in order to  o   Review health changes.   o   Discuss preventive care.    o   Review your medicines if your doctor has prescribed any.     Have a cholesterol test every 5 years, or more frequently if you are at risk for high  cholesterol/heart disease.     Have a diabetes test (fasting glucose) every three years. If you are at risk for diabetes, you should have this test more often.     Have a colonoscopy at age 50, or have a yearly FIT test (stool test). These exams will check for colon cancer.      Talk with your health care provider about whether or not a prostate cancer screening test (PSA) is right for you.    You should be tested each year for STDs (sexually transmitted diseases), if you re at risk.     Shots: Get a flu shot each year. Get a tetanus shot every 10 years.     Nutrition:    Eat at least 5 servings of fruits and vegetables daily.     Eat whole-grain bread, whole-wheat pasta and brown rice instead of white grains and rice.     Get adequate Calcium and Vitamin D.     Lifestyle    Exercise for at least 150 minutes a week (30 minutes a day, 5 days a week). This will help you control your weight and prevent disease.     Limit alcohol to one drink per day.     No smoking.     Wear sunscreen to prevent skin cancer.     See your dentist every six months for an exam and cleaning.     See your eye doctor every 1 to 2 years.            Follow-ups after your visit        Who to contact     If you have questions or need follow up information about today's clinic visit or your schedule please contact [facility] directly at [phone].  Normal or non-critical lab and imaging results will be communicated to you by MyChart, letter or phone within 4 business days after the clinic has received the results. If you do not hear from us within 7 days, please contact the clinic through [employer] or phone. If you have a critical or abnormal lab result, we will notify you by phone as soon as possible.  Submit refill requests through WhenSoon or call your pharmacy and they will forward the refill request to us. Please allow 3 business days for your refill to be completed.          Additional Information About Your Visit       "  MyChart Information     [employer] lets you send messages to your doctor, view your test results, renew your prescriptions, schedule appointments and more. To sign up, go to [email or web address] . Click on \"Log in\" on the left side of the screen, which will take you to the Welcome page. Then click on \"Sign up Now\" on the right side of the page.     You will be asked to enter the access code listed below, as well as some personal information. Please follow the directions to create your username and password.     Your access code is: [email or web address]  Expires: 2018  3:26 PM     Your access code will  in 90 days. If you need help or a new code, please call your [place] clinic or [phone].        Care EveryWhere ID     This is your Care EveryWhere ID. This could be used by other organizations to access your [place] medical records  [id number]        Your Vitals Were     Pulse Temperature Height Pulse Oximetry BMI (Body Mass Index)       50 97.4  F (36.3  C) (Tympanic) 5' 9.75\" (1.772 m) 94% 32.02 kg/m2        Blood Pressure from Last 3 Encounters:   18 124/70   18 124/76   18 (!) 141/97    Weight from Last 3 Encounters:   18 221 lb 9.6 oz (100.5 kg)   18 225 lb (102.1 kg)   17 232 lb 9.6 oz (105.5 kg)              We Performed the Following     **Basic metabolic panel FUTURE anytime     HIV Antigen Antibody Combo     Lipid panel reflex to direct LDL Fasting          Where to get your medicines      These medications were sent to [facility] [address]  [address] [address] [address]     Phone:  [phone]     lisinopril 5 MG tablet    metoprolol tartrate 50 MG tablet    rosuvastatin 20 MG tablet    triamterene-hydrochlorothiazide 37.5-25 MG per tablet          Primary Care Provider Office Phone # Fax #    Rafael Velasco [phone] [address] [address]   "      Equal Access to Services     [facility] CONNIE : Hadii aad ku [place] San, wajesusda luqadaha, qaybta kaalmarhiannon carrizales. So [facility] [phone].    ATENCIÓN: Si habla español, tiene a ehlms disposición servicios gratuitos de asistencia lingüística. Brandoname al [phone].    We comply with applicable federal civil rights laws and [place] laws. We do not discriminate on the basis of race, color, national origin, age, disability, sex, sexual orientation, or gender identity.            Thank you!     Thank you for choosing [facility]  for your care. Our goal is always to provide you with excellent care. Hearing back from our patients is one way we can continue to improve our services. Please take a few minutes to complete the written survey that you may receive in the mail after your visit with us. Thank you!             Your Updated Medication List - Protect others around you: Learn how to safely use, store and throw away your medicines at www.disposemymeds.org.          This list is accurate as of 7/31/18  8:01 AM.  Always use your most recent med list.                   Brand Name Dispense Instructions for use Diagnosis    aspirin 81 MG tablet      Take 1 tablet by mouth daily.        lisinopril 5 MG tablet    PRINIVIL/ZESTRIL    90 tablet    Take 1 tablet (5 mg) by mouth daily    Essential hypertension with goal blood pressure less than 140/90       metoprolol tartrate 50 MG tablet    LOPRESSOR    180 tablet    Take 1 tablet (50 mg) by mouth 2 times daily    Essential hypertension with goal blood pressure less than 140/90       rosuvastatin 20 MG tablet    CRESTOR    90 tablet    Take 1 tablet (20 mg) by mouth daily    Hyperlipidemia with target LDL less than 130       triamterene-hydrochlorothiazide 37.5-25 MG per tablet    MAXZIDE-25    90 tablet    Take 1 tablet by mouth daily    Essential hypertension with goal blood pressure less than 140/90

## 2018-07-31 NOTE — PROGRESS NOTES
SUBJECTIVE:   CC: Quang Gillespie is an 62 year old male who presents for preventative health visit.     Healthy Habits:    Do you get at least three servings of calcium containing foods daily (dairy, green leafy vegetables, etc.)? yes    Amount of exercise or daily activities, outside of work: none    Problems taking medications regularly No    Medication side effects: No    Have you had an eye exam in the past two years? no    Do you see a dentist twice per year? yes    Do you have sleep apnea, excessive snoring or daytime drowsiness? no     No other concerns.    Today's PHQ-2 Score:   PHQ-2 ( 1999 [employer]) 7/31/2018 7/6/2017   Q1: Little interest or pleasure in doing things 0 0   Q2: Feeling down, depressed or hopeless 0 0   PHQ-2 Score 0 0       Abuse: Current or Past(Physical, Sexual or Emotional)- No  Do you feel safe in your environment - Yes    Social History   Substance Use Topics     Smoking status: Never Smoker     Smokeless tobacco: Never Used     Alcohol use Yes      Comment: 2 drinks per day      If you drink alcohol do you typically have >3 drinks per day or >7 drinks per week? No                      Last PSA:   PSA   Date Value Ref Range Status   03/18/2015 0.38 0 - 4 ug/L Final     Patient denies BM or urinary changes.  No fam hx of colon cancer.  No fam hx of prostate cancer.    Reviewed orders with patient. Reviewed health maintenance and updated orders accordingly - Yes  Labs reviewed in EPIC  BP Readings from Last 3 Encounters:   07/31/18 124/70   06/09/18 124/76   02/23/18 (!) 141/97    Wt Readings from Last 3 Encounters:   07/31/18 221 lb 9.6 oz (100.5 kg)   02/23/18 225 lb (102.1 kg)   07/06/17 232 lb 9.6 oz (105.5 kg)                  Patient Active Problem List   Diagnosis     Hyperlipidemia with target LDL less than 130     Benign essential hypertension     Coronary artery disease involving native coronary artery of native heart without angina pectoris     Advanced directives,  counseling/discussion     Impacted cerumen of both ears     Non morbid obesity due to excess calories     Past Surgical History:   Procedure Laterality Date     CARDIAC SURGERY      1 stentplaced     COLONOSCOPY N/A 2/23/2018    Procedure: COLONOSCOPY;  colonoscopy;  Surgeon: Daniel Caldera MD;  Location: [facility]       Social History   Substance Use Topics     Smoking status: Never Smoker     Smokeless tobacco: Never Used     Alcohol use Yes      Comment: 2 drinks per day     Family History   Problem Relation Age of Onset     Cancer Mother      brain tumor     C.A.D. Mother      Alcohol/Drug Mother      smoker     Cancer Father      hodgkins     Alcohol/Drug Father      smoker         Current Outpatient Prescriptions   Medication Sig Dispense Refill     aspirin 81 MG tablet Take 1 tablet by mouth daily.       lisinopril (PRINIVIL/ZESTRIL) 5 MG tablet Take 1 tablet (5 mg) by mouth daily 90 tablet 3     metoprolol tartrate (LOPRESSOR) 50 MG tablet Take 1 tablet (50 mg) by mouth 2 times daily 180 tablet 3     rosuvastatin (CRESTOR) 20 MG tablet Take 1 tablet (20 mg) by mouth daily 90 tablet 3     triamterene-hydrochlorothiazide (MAXZIDE-25) 37.5-25 MG per tablet Take 1 tablet by mouth daily 90 tablet 3     [DISCONTINUED] lisinopril (PRINIVIL/ZESTRIL) 5 MG tablet TAKE 1 TABLET (5 MG) BY MOUTH DAILY 30 tablet 0     [DISCONTINUED] metoprolol tartrate (LOPRESSOR) 50 MG tablet TAKE 1 TABLET (50 MG) BY MOUTH 2 TIMES DAILY 60 tablet 0     [DISCONTINUED] rosuvastatin (CRESTOR) 20 MG tablet TAKE 1 TABLET (20 MG) BY MOUTH DAILY 30 tablet 0     [DISCONTINUED] triamterene-hydrochlorothiazide (MAXZIDE-25) 37.5-25 MG per tablet TAKE 1 TABLET BY MOUTH DAILY 30 tablet 0     No Known Allergies    Reviewed and updated as needed this visit by clinical staff  Tobacco  Allergies  Med Hx  Surg Hx  Fam Hx  Soc Hx        Reviewed and updated as needed this visit by Provider        Past Medical History:   Diagnosis Date     Heart disease   "   stent 2011     Hyperlipidemia LDL goal < 130      Hypertension       Past Surgical History:   Procedure Laterality Date     CARDIAC SURGERY      1 stentplaced     COLONOSCOPY N/A 2/23/2018    Procedure: COLONOSCOPY;  colonoscopy;  Surgeon: Daniel Caldera MD;  Location: [facility]       ROS:  CONSTITUTIONAL: NEGATIVE for fever, chills, change in weight  INTEGUMENTARY/SKIN: NEGATIVE for worrisome rashes, moles or lesions  EYES: NEGATIVE for vision changes or irritation  ENT: NEGATIVE for ear, mouth and throat problems  RESP: NEGATIVE for significant cough or SOB  CV: NEGATIVE for chest pain, palpitations or peripheral edema  GI: NEGATIVE for nausea, abdominal pain, heartburn, or change in bowel habits   male: negative for dysuria, hematuria, decreased urinary stream, erectile dysfunction, urethral discharge  MUSCULOSKELETAL: NEGATIVE for significant arthralgias or myalgia  NEURO: NEGATIVE for weakness, dizziness or paresthesias  ENDOCRINE: NEGATIVE for temperature intolerance, skin/hair changes  HEME/ALLERGY/IMMUNE: NEGATIVE for bleeding problems  PSYCHIATRIC: NEGATIVE for changes in mood or affect    OBJECTIVE:   /70  Pulse 50  Temp 97.4  F (36.3  C) (Tympanic)  Ht 5' 9.75\" (1.772 m)  Wt 221 lb 9.6 oz (100.5 kg)  SpO2 94%  BMI 32.02 kg/m2  EXAM:  GENERAL APPEARANCE: obese, alert and no distress  EYES: pink conj, no icterus, PERRL, EOMI  HENT: ear canals and TM's normal, nose and mouth without ulcers or lesions, oropharynx clear and oral mucous membranes moist  NECK: no adenopathy, no asymmetry, masses, or scars and thyroid normal to palpation  RESP: lungs clear to auscultation - no rales, rhonchi or wheezes  CV: regular rates and rhythm, normal S1 S2, no S3 or S4, no murmur, click or rub, no peripheral edema and peripheral pulses strong  ABDOMEN: soft, nontender, no hepatosplenomegaly, no masses and bowel sounds normal  RECTAL: deferred  MS: no musculoskeletal defects are noted and gait is age " appropriate without ataxia  SKIN: no suspicious lesions or rashes  NEURO: Normal strength and tone, sensory exam grossly normal, mentation intact and speech normal    Diagnostic Test Results:  none     ASSESSMENT/PLAN:   Quang was seen today for physical.    Diagnoses and all orders for this visit:    Encounter for routine adult health examination without abnormal findings  Patient was advised on recommended screening and preventive health recommendations.  He verbalized understanding and agreed to the plans below.    Hyperlipidemia with target LDL less than 130  -     Lipid panel reflex to direct LDL Fasting  -     rosuvastatin (CRESTOR) 20 MG tablet; Take 1 tablet (20 mg) by mouth daily  Reinforced heart healthy lifestyle.    Essential hypertension with goal blood pressure less than 140/90  -     **Basic metabolic panel FUTURE anytime  -     lisinopril (PRINIVIL/ZESTRIL) 5 MG tablet; Take 1 tablet (5 mg) by mouth daily  -     metoprolol tartrate (LOPRESSOR) 50 MG tablet; Take 1 tablet (50 mg) by mouth 2 times daily  -     triamterene-hydrochlorothiazide (MAXZIDE-25) 37.5-25 MG per tablet; Take 1 tablet by mouth daily  Controlled.  Low salt, low fat diet.   Exercise as tolerated.  Take meds as prescribed; call if with side effects.     Screening for human immunodeficiency virus  -     HIV Antigen Antibody Combo  Offered screening based on current recommendations. Patient concurred to screen.    Non morbid obesity due to excess calories  Discussed risks of obesity: heart disease, stroke, end-organ damage, development of  DM, decreased quality of life  Counselled on diet, exercise and weight loss regimen    Coronary artery disease involving native coronary artery of native heart without angina pectoris  Asymptomatic per patient.  Continue ASA daily.  Optimize management of the above.  Reinforced heart healthy lifestyle.        COUNSELING:  Reviewed preventive health counseling, as reflected in patient  "instructions       Regular exercise       Healthy diet/nutrition       Vision screening       Hearing screening       Aspirin Prophylaxsis       Alcohol Use       Safe sex practices/STD prevention       Colon cancer screening       Prostate cancer screening       Osteoporosis Prevention/Bone Health       The 10-year ASCVD risk score (Shalom DIALLO Jr, et al., 2013) is: 7.2%    Values used to calculate the score:      Age: 62 years      Sex: Male      Is Non- : No      Diabetic: No      Tobacco smoker: No      Systolic Blood Pressure: 124 mmHg      Is BP treated: Yes      HDL Cholesterol: 51 mg/dL      Total Cholesterol: 150 mg/dL       Advance Care Planning    BP Readings from Last 1 Encounters:   07/31/18 124/70     Estimated body mass index is 32.02 kg/(m^2) as calculated from the following:    Height as of this encounter: 5' 9.75\" (1.772 m).    Weight as of this encounter: 221 lb 9.6 oz (100.5 kg).      Weight management plan: Discussed healthy diet and exercise guidelines and patient will follow up in 12 months in clinic to re-evaluate.     reports that he has never smoked. He has never used smokeless tobacco.      Counseling Resources:  ATP IV Guidelines  Pooled Cohorts Equation Calculator  FRAX Risk Assessment  ICSI Preventive Guidelines  Dietary Guidelines for Americans, 2010  USDA's MyPlate  ASA Prophylaxis  Lung CA Screening    Rafael Velasco MD  [facility]  "

## 2018-07-31 NOTE — LETTER
August 2, 2018      Quang Gillespie  [address] [address] [address]        Dear ,    We are writing to inform you of your test results.  Aside from a slightly above normal fasting sugar level, all your blood tests are normal.  Please follow ad be consistent with the recommendations printed in your visit summary.  We will repeat your tests next year.    Resulted Orders   Lipid panel reflex to direct LDL Fasting   Result Value Ref Range    Cholesterol 150 <200 mg/dL    Triglycerides 91 <150 mg/dL    HDL Cholesterol 51 >39 mg/dL    LDL Cholesterol Calculated 81 <100 mg/dL      Comment:      Desirable:       <100 mg/dl    Non HDL Cholesterol 99 <130 mg/dL   **Basic metabolic panel FUTURE anytime   Result Value Ref Range    Sodium 136 133 - 144 mmol/L    Potassium 3.9 3.4 - 5.3 mmol/L    Chloride 101 94 - 109 mmol/L    Carbon Dioxide 30 20 - 32 mmol/L    Anion Gap 5 3 - 14 mmol/L    Glucose 104 (H) 70 - 99 mg/dL    Urea Nitrogen 18 7 - 30 mg/dL    Creatinine 0.96 0.66 - 1.25 mg/dL    GFR Estimate 80 >60 mL/min/1.7m2      Comment:      Non  GFR Calc    GFR Estimate If Black >90 >60 mL/min/1.7m2      Comment:       GFR Calc    Calcium 8.6 8.5 - 10.1 mg/dL   HIV Antigen Antibody Combo   Result Value Ref Range    HIV Antigen Antibody Combo Nonreactive NR^Nonreactive          Comment:      HIV-1 p24 Ag & HIV-1/HIV-2 Ab Not Detected       If you have any questions or concerns, please call the clinic at the number listed above.       Sincerely,        Rafael Velasco MD

## 2018-07-31 NOTE — PATIENT INSTRUCTIONS
You will be contacted in 1-2 days for results of your lab tests.    Be consistent with low salt,  low trans fat and saturated fat diet.  Eat food rich in omega-3-fatty acids as you tolerate. (salmon, olive oil)  Eat 5 cups of vegetables, fruits and whole grains per day.  Limit starchy food (white rice, white bread, white pasta, white potatoes) to less than a cup per meal.  Minimize sweets, junk food and fastfood. Limit soda beverages to one serving per day; best to avoid it altogether though.  Exercise: moderate intensity sustained for at least 30 mins per episode, goal of 150 mins per week at least  Combine cardiovascular and resistance exercises.  These exercise recommendations are in addition to your daily activity at work or home.  Work on losing weight.    Get a flu shot every October.    Schedule preventive eye exam soon!      Preventive Health Recommendations  Male Ages 50   64    Yearly exam:             See your health care provider every year in order to  o   Review health changes.   o   Discuss preventive care.    o   Review your medicines if your doctor has prescribed any.     Have a cholesterol test every 5 years, or more frequently if you are at risk for high cholesterol/heart disease.     Have a diabetes test (fasting glucose) every three years. If you are at risk for diabetes, you should have this test more often.     Have a colonoscopy at age 50, or have a yearly FIT test (stool test). These exams will check for colon cancer.      Talk with your health care provider about whether or not a prostate cancer screening test (PSA) is right for you.    You should be tested each year for STDs (sexually transmitted diseases), if you re at risk.     Shots: Get a flu shot each year. Get a tetanus shot every 10 years.     Nutrition:    Eat at least 5 servings of fruits and vegetables daily.     Eat whole-grain bread, whole-wheat pasta and brown rice instead of white grains and rice.     Get adequate Calcium and  Vitamin D.     Lifestyle    Exercise for at least 150 minutes a week (30 minutes a day, 5 days a week). This will help you control your weight and prevent disease.     Limit alcohol to one drink per day.     No smoking.     Wear sunscreen to prevent skin cancer.     See your dentist every six months for an exam and cleaning.     See your eye doctor every 1 to 2 years.

## 2019-08-16 DIAGNOSIS — E78.5 HYPERLIPIDEMIA WITH TARGET LDL LESS THAN 130: ICD-10-CM

## 2019-08-16 DIAGNOSIS — I10 ESSENTIAL HYPERTENSION WITH GOAL BLOOD PRESSURE LESS THAN 140/90: ICD-10-CM

## 2019-08-16 NOTE — LETTER
[facility]  [address] [address]  Phone: [phone]       August 19, 2019         Quang Gillespie  [address] [address] [address]            Dear Quang:    We are concerned about your health care.  We recently provided you with medication refills.  Many medications require routine follow-up with your doctor.    Your prescription(s) have been refilled for 30 days so you may have time for the above noted follow-up. Please call to schedule soon so we can assure you have an appointment before your next refills are needed.    Thank you,      Rafael Velasco MD / [place]

## 2019-08-19 RX ORDER — METOPROLOL TARTRATE 50 MG
50 TABLET ORAL 2 TIMES DAILY
Qty: 60 TABLET | Refills: 0 | Status: SHIPPED | OUTPATIENT
Start: 2019-08-19 | End: 2019-08-27

## 2019-08-19 RX ORDER — ROSUVASTATIN CALCIUM 20 MG/1
20 TABLET, COATED ORAL DAILY
Qty: 30 TABLET | Refills: 0 | Status: SHIPPED | OUTPATIENT
Start: 2019-08-19 | End: 2019-08-27

## 2019-08-19 RX ORDER — TRIAMTERENE/HYDROCHLOROTHIAZID 37.5-25 MG
1 TABLET ORAL DAILY
Qty: 30 TABLET | Refills: 0 | Status: SHIPPED | OUTPATIENT
Start: 2019-08-19 | End: 2019-08-27

## 2019-08-19 RX ORDER — LISINOPRIL 5 MG/1
5 TABLET ORAL DAILY
Qty: 30 TABLET | Refills: 0 | Status: SHIPPED | OUTPATIENT
Start: 2019-08-19 | End: 2019-08-27

## 2019-08-19 NOTE — TELEPHONE ENCOUNTER
Due for clinic visit and labs. 30 day eden fill given. Reminder letter sent.      Selene CARBALLO RN

## 2019-08-19 NOTE — TELEPHONE ENCOUNTER
"Requested Prescriptions   Pending Prescriptions Disp Refills     metoprolol tartrate (LOPRESSOR) 50 MG tablet [Pharmacy Med Name: METOPROLOL TARTRATE 50 MG T 50 TAB] 180 tablet 3     Sig: TAKE 1 TABLET (50 MG) BY MOUTH 2 TIMES DAILY   Last Written Prescription Date:  7/31/18  Last Fill Quantity: 180 tab,  # refills: 3   Last office visit: 7/31/2018 with prescribing provider:  Rafael Velasco     Future Office Visit:        Beta-Blockers Protocol Failed - 8/16/2019  4:45 PM        Failed - Blood pressure under 140/90 in past 12 months     BP Readings from Last 3 Encounters:   07/31/18 124/70   06/09/18 124/76   02/23/18 (!) 141/97                 Failed - Recent (12 mo) or future (30 days) visit within the authorizing provider's specialty     Patient had office visit in the last 12 months or has a visit in the next 30 days with authorizing provider or within the authorizing provider's specialty.  See \"Patient Info\" tab in inbasket, or \"Choose Columns\" in Meds & Orders section of the refill encounter.              Passed - Patient is age 6 or older        Passed - Medication is active on med list        triamterene-HCTZ (MAXZIDE-25) 37.5-25 MG tablet [Pharmacy Med Name: TRIAMTERENE-HCTZ 37.5-25 MG 37.5-25 TAB] 90 tablet 3     Sig: TAKE 1 TABLET BY MOUTH DAILY   Last Written Prescription Date:  7/31/18  Last Fill Quantity: 90 tab,  # refills: 3   Last office visit: 7/31/2018 with prescribing provider:  Rafael Velasco     Future Office Visit:        Diuretics (Including Combos) Protocol Failed - 8/16/2019  4:45 PM        Failed - Blood pressure under 140/90 in past 12 months     BP Readings from Last 3 Encounters:   07/31/18 124/70   06/09/18 124/76   02/23/18 (!) 141/97                 Failed - Recent (12 mo) or future (30 days) visit within the authorizing provider's specialty     Patient had office visit in the last 12 months or has a visit in the next 30 days with authorizing provider or within " "the authorizing provider's specialty.  See \"Patient Info\" tab in inbasket, or \"Choose Columns\" in Meds & Orders section of the refill encounter.              Failed - Normal serum creatinine on file in past 12 months     Recent Labs   Lab Test 07/31/18  0704   CR 0.96              Failed - Normal serum potassium on file in past 12 months     Recent Labs   Lab Test 07/31/18  0704   POTASSIUM 3.9                    Failed - Normal serum sodium on file in past 12 months     Recent Labs   Lab Test 07/31/18  0704                 Passed - Medication is active on med list        Passed - Patient is age 18 or older        lisinopril (PRINIVIL/ZESTRIL) 5 MG tablet [Pharmacy Med Name: LISINOPRIL 5 MG TABLET 5 TAB] 90 tablet 3     Sig: TAKE 1 TABLET (5 MG) BY MOUTH DAILY   Last Written Prescription Date:  7/31/18  Last Fill Quantity: 90 tab,  # refills: 3   Last office visit: 7/31/2018 with prescribing provider:  Rafael Velasco     Future Office Visit:        ACE Inhibitors (Including Combos) Protocol Failed - 8/16/2019  4:45 PM        Failed - Blood pressure under 140/90 in past 12 months     BP Readings from Last 3 Encounters:   07/31/18 124/70   06/09/18 124/76   02/23/18 (!) 141/97                 Failed - Recent (12 mo) or future (30 days) visit within the authorizing provider's specialty     Patient had office visit in the last 12 months or has a visit in the next 30 days with authorizing provider or within the authorizing provider's specialty.  See \"Patient Info\" tab in inbasket, or \"Choose Columns\" in Meds & Orders section of the refill encounter.              Failed - Normal serum creatinine on file in past 12 months     Recent Labs   Lab Test 07/31/18  0704   CR 0.96             Failed - Normal serum potassium on file in past 12 months     Recent Labs   Lab Test 07/31/18  0704   POTASSIUM 3.9             Passed - Medication is active on med list        Passed - Patient is age 18 or older        " "rosuvastatin (CRESTOR) 20 MG tablet [Pharmacy Med Name: ROSUVASTATIN CALCIUM 20 MG 20 TAB] 90 tablet 3     Sig: TAKE 1 TABLET (20 MG) BY MOUTH DAILY   Last Written Prescription Date:  7/31/18  Last Fill Quantity: 90 tab,  # refills: 3   Last office visit: 7/31/2018 with prescribing provider:  Rafael Velasco     Future Office Visit:        Statins Protocol Failed - 8/16/2019  4:45 PM        Failed - LDL on file in past 12 months     Recent Labs   Lab Test 07/31/18  0704   LDL 81             Failed - Recent (12 mo) or future (30 days) visit within the authorizing provider's specialty     Patient had office visit in the last 12 months or has a visit in the next 30 days with authorizing provider or within the authorizing provider's specialty.  See \"Patient Info\" tab in inbasket, or \"Choose Columns\" in Meds & Orders section of the refill encounter.              Passed - No abnormal creatine kinase in past 12 months     No lab results found.             Passed - Medication is active on med list        Passed - Patient is age 18 or older          "

## 2019-08-27 ENCOUNTER — OFFICE VISIT (OUTPATIENT)
Dept: FAMILY MEDICINE | Facility: CLINIC | Age: 64
End: 2019-08-27
Payer: COMMERCIAL

## 2019-08-27 VITALS
TEMPERATURE: 97.6 F | BODY MASS INDEX: 32.7 KG/M2 | HEIGHT: 70 IN | SYSTOLIC BLOOD PRESSURE: 112 MMHG | WEIGHT: 228.4 LBS | RESPIRATION RATE: 14 BRPM | OXYGEN SATURATION: 96 % | DIASTOLIC BLOOD PRESSURE: 72 MMHG | HEART RATE: 54 BPM

## 2019-08-27 DIAGNOSIS — I10 BENIGN ESSENTIAL HYPERTENSION: ICD-10-CM

## 2019-08-27 DIAGNOSIS — Z00.00 ROUTINE GENERAL MEDICAL EXAMINATION AT A HEALTH CARE FACILITY: Primary | ICD-10-CM

## 2019-08-27 DIAGNOSIS — L70.0 OPEN COMEDONE: ICD-10-CM

## 2019-08-27 DIAGNOSIS — E78.2 MIXED HYPERLIPIDEMIA: ICD-10-CM

## 2019-08-27 DIAGNOSIS — E66.09 NON MORBID OBESITY DUE TO EXCESS CALORIES: ICD-10-CM

## 2019-08-27 DIAGNOSIS — Z12.5 SCREENING FOR PROSTATE CANCER: ICD-10-CM

## 2019-08-27 LAB
ANION GAP SERPL CALCULATED.3IONS-SCNC: 5 MMOL/L (ref 3–14)
BUN SERPL-MCNC: 15 MG/DL (ref 7–30)
CALCIUM SERPL-MCNC: 9.3 MG/DL (ref 8.5–10.1)
CHLORIDE SERPL-SCNC: 100 MMOL/L (ref 94–109)
CHOLEST SERPL-MCNC: 175 MG/DL
CO2 SERPL-SCNC: 30 MMOL/L (ref 20–32)
CREAT SERPL-MCNC: 0.9 MG/DL (ref 0.66–1.25)
GFR SERPL CREATININE-BSD FRML MDRD: >90 ML/MIN/{1.73_M2}
GLUCOSE SERPL-MCNC: 117 MG/DL (ref 70–99)
HDLC SERPL-MCNC: 59 MG/DL
LDLC SERPL CALC-MCNC: 85 MG/DL
NONHDLC SERPL-MCNC: 116 MG/DL
POTASSIUM SERPL-SCNC: 4.1 MMOL/L (ref 3.4–5.3)
PSA SERPL-ACNC: 0.33 UG/L (ref 0–4)
SODIUM SERPL-SCNC: 135 MMOL/L (ref 133–144)
TRIGL SERPL-MCNC: 154 MG/DL

## 2019-08-27 PROCEDURE — 80061 LIPID PANEL: CPT | Performed by: FAMILY MEDICINE

## 2019-08-27 PROCEDURE — 99396 PREV VISIT EST AGE 40-64: CPT | Performed by: FAMILY MEDICINE

## 2019-08-27 PROCEDURE — 80048 BASIC METABOLIC PNL TOTAL CA: CPT | Performed by: FAMILY MEDICINE

## 2019-08-27 PROCEDURE — 36415 COLL VENOUS BLD VENIPUNCTURE: CPT | Performed by: FAMILY MEDICINE

## 2019-08-27 PROCEDURE — G0103 PSA SCREENING: HCPCS | Performed by: FAMILY MEDICINE

## 2019-08-27 RX ORDER — ROSUVASTATIN CALCIUM 20 MG/1
20 TABLET, COATED ORAL DAILY
Qty: 90 TABLET | Refills: 3 | Status: SHIPPED | OUTPATIENT
Start: 2019-08-27 | End: 2020-10-02

## 2019-08-27 RX ORDER — TRIAMTERENE/HYDROCHLOROTHIAZID 37.5-25 MG
1 TABLET ORAL DAILY
Qty: 90 TABLET | Refills: 3 | Status: SHIPPED | OUTPATIENT
Start: 2019-08-27 | End: 2020-10-02

## 2019-08-27 RX ORDER — LISINOPRIL 5 MG/1
5 TABLET ORAL DAILY
Qty: 90 TABLET | Refills: 3 | Status: SHIPPED | OUTPATIENT
Start: 2019-08-27 | End: 2020-10-02

## 2019-08-27 RX ORDER — METOPROLOL TARTRATE 50 MG
50 TABLET ORAL 2 TIMES DAILY
Qty: 180 TABLET | Refills: 3 | Status: SHIPPED | OUTPATIENT
Start: 2019-08-27 | End: 2020-10-02

## 2019-08-27 ASSESSMENT — MIFFLIN-ST. JEOR: SCORE: 1843.3

## 2019-08-27 NOTE — LETTER
August 28, 2019      Quang Gillespie  [address] [address] [address]        Dear ,    We are writing to inform you of your test results.  Borderline high triglycerides and fasting blood sugar.   All other blood tests are in normal range.     Be consistent with low trans fat and saturated fat diet.   Eat food rich in omega-3-fatty acids as you tolerate. (salmon, olive oil)   Eat 5 cups of vegetables, fruits and whole grains per day.   Limit starchy food (white rice, white bread, white pasta, white potatoes) to less than a cup per meal.   Minimize sweets, junk food and fastfood. Limit soda beverages to one serving per day; best to avoid it altogether though.   Exercise: moderate intensity sustained for at least 30 mins per episode, goal of 150 mins per week at least   Combine cardiovascular and resistance exercises.   These exercise recommendations are in addition to your daily activity at work or home.   Work on losing weight.     Repeat blood tests in a year.     Resulted Orders   Lipid panel reflex to direct LDL Fasting   Result Value Ref Range    Cholesterol 175 <200 mg/dL    Triglycerides 154 (H) <150 mg/dL      Comment:      Borderline high:  150-199 mg/dl  High:             200-499 mg/dl  Very high:       >499 mg/dl  Fasting specimen      HDL Cholesterol 59 >39 mg/dL    LDL Cholesterol Calculated 85 <100 mg/dL      Comment:      Desirable:       <100 mg/dl    Non HDL Cholesterol 116 <130 mg/dL   Basic metabolic panel   Result Value Ref Range    Sodium 135 133 - 144 mmol/L    Potassium 4.1 3.4 - 5.3 mmol/L    Chloride 100 94 - 109 mmol/L    Carbon Dioxide 30 20 - 32 mmol/L    Anion Gap 5 3 - 14 mmol/L    Glucose 117 (H) 70 - 99 mg/dL      Comment:      Fasting specimen    Urea Nitrogen 15 7 - 30 mg/dL    Creatinine 0.90 0.66 - 1.25 mg/dL    GFR Estimate >90 >60 mL/min/[1.73_m2]      Comment:      Non  GFR Calc  Starting 12/18/2018, serum creatinine based estimated GFR (eGFR) will be    calculated using the Chronic Kidney Disease Epidemiology Collaboration   (CKD-EPI) equation.      GFR Estimate If Black >90 >60 mL/min/[1.73_m2]      Comment:       GFR Calc  Starting 12/18/2018, serum creatinine based estimated GFR (eGFR) will be   calculated using the Chronic Kidney Disease Epidemiology Collaboration   (CKD-EPI) equation.      Calcium 9.3 8.5 - 10.1 mg/dL   Prostate spec antigen screen   Result Value Ref Range    PSA 0.33 0 - 4 ug/L      Comment:      Assay Method:  Chemiluminescence using Siemens Vista analyzer       If you have any questions or concerns, please call the clinic at the number listed above.       Sincerely,        Rafael Velasco MD

## 2019-08-27 NOTE — PATIENT INSTRUCTIONS
You will be contacted in 1-2 days for results of your lab tests.    You may get the Shingrix vaccine for shingles if you desire, and after you verify with insurance how they cover the vaccine.    Be consistent with low salt, low trans fat and low saturated fat diet.  Eat food rich in omega-3-fatty acids as you tolerate. (salmon, olive oil)  Eat 5 cups of vegetables, fruits and whole grains per day.  Limit starchy food (white rice, white bread, white pasta, white potatoes) to less than a cup per meal.  Minimize sweets, junk food and fastfood. Limit soda beverages to one serving per day; best to avoid it altogether though.    Exercise: moderate intensity sustained for at least 30 mins per episode, goal of 150 mins per week at least  Combine cardiovascular and resistance exercises.  These exercise recommendations are in addition to your daily activity at work or home.  Work on losing weight.    Schedule preventive eye exam soon.    Preventive Health Recommendations  Male Ages 50 - 64    Yearly exam:             See your health care provider every year in order to  o   Review health changes.   o   Discuss preventive care.    o   Review your medicines if your doctor has prescribed any.     Have a cholesterol test every 5 years, or more frequently if you are at risk for high cholesterol/heart disease.     Have a diabetes test (fasting glucose) every three years. If you are at risk for diabetes, you should have this test more often.     Have a colonoscopy at age 50, or have a yearly FIT test (stool test). These exams will check for colon cancer.      Talk with your health care provider about whether or not a prostate cancer screening test (PSA) is right for you.    You should be tested each year for STDs (sexually transmitted diseases), if you re at risk.     Shots: Get a flu shot each year. Get a tetanus shot every 10 years.     Nutrition:    Eat at least 5 servings of fruits and vegetables daily.     Eat whole-grain  bread, whole-wheat pasta and brown rice instead of white grains and rice.     Get adequate Calcium and Vitamin D.     Lifestyle    Exercise for at least 150 minutes a week (30 minutes a day, 5 days a week). This will help you control your weight and prevent disease.     Limit alcohol to one drink per day.     No smoking.     Wear sunscreen to prevent skin cancer.     See your dentist every six months for an exam and cleaning.     See your eye doctor every 1 to 2 years.

## 2019-08-27 NOTE — PROGRESS NOTES
SUBJECTIVE:   CC: Quang Gillespie is an 63 year old male who presents for preventive health visit.     Healthy Habits:    Do you get at least three servings of calcium containing foods daily (dairy, green leafy vegetables, etc.)? 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 no    Do you see a dentist twice per year? Once per yr    Do you have sleep apnea, excessive snoring or daytime drowsiness? no    No other concerns.    Patient requests refill of all medications.    Today's PHQ-2 Score:   PHQ-2 ( 1999 [employer]) 8/27/2019 7/31/2018   Q1: Little interest or pleasure in doing things 0 0   Q2: Feeling down, depressed or hopeless 0 0   PHQ-2 Score 0 0       Abuse: Current or Past(Physical, Sexual or Emotional)- No  Do you feel safe in your environment? Yes    Social History     Tobacco Use     Smoking status: Never Smoker     Smokeless tobacco: Never Used   Substance Use Topics     Alcohol use: Yes     Comment: occasional     If you drink alcohol do you typically have >3 drinks per day or >7 drinks per week? No                      Last PSA:   PSA   Date Value Ref Range Status   08/27/2019 0.33 0 - 4 ug/L Final     Comment:     Assay Method:  Chemiluminescence using Siemens Vista analyzer       Reviewed orders with patient. Reviewed health maintenance and updated orders accordingly - Yes  Patient Active Problem List   Diagnosis     Mixed hyperlipidemia     Benign essential hypertension     Coronary artery disease involving native coronary artery of native heart without angina pectoris     Advanced directives, counseling/discussion     Impacted cerumen of both ears     Non morbid obesity due to excess calories     Past Surgical History:   Procedure Laterality Date     CARDIAC SURGERY      1 stentplaced     COLONOSCOPY N/A 2/23/2018    Procedure:  COLONOSCOPY;  colonoscopy;  Surgeon: Daniel Caldera MD;  Location: [facility]       Social History     Tobacco Use     Smoking status: Never Smoker     Smokeless tobacco: Never Used   Substance Use Topics     Alcohol use: Yes     Comment: occasional     Family History   Problem Relation Age of Onset     Cancer Mother         brain tumor     C.A.D. Mother      Alcohol/Drug Mother         smoker     Cancer Father         hodgkins     Alcohol/Drug Father         smoker         Current Outpatient Medications   Medication Sig Dispense Refill     aspirin 81 MG tablet Take 1 tablet by mouth daily.       lisinopril (PRINIVIL/ZESTRIL) 5 MG tablet Take 1 tablet (5 mg) by mouth daily 90 tablet 3     metoprolol tartrate (LOPRESSOR) 50 MG tablet Take 1 tablet (50 mg) by mouth 2 times daily 180 tablet 3     rosuvastatin (CRESTOR) 20 MG tablet Take 1 tablet (20 mg) by mouth daily 90 tablet 3     triamterene-HCTZ (MAXZIDE-25) 37.5-25 MG tablet Take 1 tablet by mouth daily 90 tablet 3     No Known Allergies    Reviewed and updated as needed this visit by clinical staff  Tobacco  Allergies  Meds  Problems  Med Hx  Surg Hx  Fam Hx  Soc Hx          Reviewed and updated as needed this visit by Provider  Tobacco  Allergies  Meds  Problems  Med Hx  Surg Hx  Fam Hx        Past Medical History:   Diagnosis Date     Heart disease     stent 2011     Hyperlipidemia LDL goal < 130      Hypertension       Past Surgical History:   Procedure Laterality Date     CARDIAC SURGERY      1 stentplaced     COLONOSCOPY N/A 2/23/2018    Procedure: COLONOSCOPY;  colonoscopy;  Surgeon: Daniel Caldera MD;  Location: [facility]       ROS:  CONSTITUTIONAL: NEGATIVE for fever, chills or change in weight  INTEGUMENTARY/SKIN: NEGATIVE for worrisome rashes, moles or lesions  EYES: NEGATIVE for vision changes or irritation  ENT: NEGATIVE for ear, mouth and throat problems  RESP: NEGATIVE for significant cough or SOB  CV: NEGATIVE for chest pain, palpitations  "or peripheral edema  GI: NEGATIVE for nausea, abdominal pain, heartburn, or change in bowel habits   male: negative for dysuria, hematuria, decreased urinary stream, erectile dysfunction, urethral discharge  MUSCULOSKELETAL: NEGATIVE for significant arthralgias or myalgia  NEURO: NEGATIVE for weakness, dizziness or paresthesias  ENDOCRINE: NEGATIVE for temperature intolerance, skin/hair changes  HEME/ALLERGY/IMMUNE: NEGATIVE for bleeding problems  PSYCHIATRIC: NEGATIVE for changes in mood or affect    Patient denies BM or urinary changes.  No fam hx of colon cancer.  No fam hx of prostate cancer.    OBJECTIVE:   /72   Pulse 54   Temp 97.6  F (36.4  C) (Tympanic)   Resp 14   Ht 1.772 m (5' 9.75\")   Wt 103.6 kg (228 lb 6.4 oz)   SpO2 96%   BMI 33.01 kg/m    EXAM:  GENERAL APPEARANCE: healthy, alert and no distress  EYES: pink conj, no icterus, PERRL, EOMI  HENT: ear canals and TM's normal, nose and mouth without ulcers or lesions, oropharynx clear and oral mucous membranes moist  NECK: no adenopathy, no asymmetry, masses, or scars and thyroid normal to palpation  RESP: lungs clear to auscultation - no rales, rhonchi or wheezes  CV: regular rates and rhythm, normal S1 S2, no S3 or S4, no murmur, click or rub, no peripheral edema and peripheral pulses strong  ABDOMEN: soft, nontender, no hepatosplenomegaly, no masses and bowel sounds normal  RECTAL: deferred  MS: no musculoskeletal defects are noted and gait is age appropriate without ataxia  SKIN: no suspicious lesions or rashes; on left upper anterior chest, there is one small open comedone with small gray sebaceous plug visible  NEURO: Normal strength and tone, sensory exam grossly normal, mentation intact and speech normal    Diagnostic Test Results:  Labs reviewed in Epic  Results for orders placed or performed in visit on 08/27/19   Lipid panel reflex to direct LDL Fasting   Result Value Ref Range    Cholesterol 175 <200 mg/dL    Triglycerides 154 " (H) <150 mg/dL    HDL Cholesterol 59 >39 mg/dL    LDL Cholesterol Calculated 85 <100 mg/dL    Non HDL Cholesterol 116 <130 mg/dL   Basic metabolic panel   Result Value Ref Range    Sodium 135 133 - 144 mmol/L    Potassium 4.1 3.4 - 5.3 mmol/L    Chloride 100 94 - 109 mmol/L    Carbon Dioxide 30 20 - 32 mmol/L    Anion Gap 5 3 - 14 mmol/L    Glucose 117 (H) 70 - 99 mg/dL    Urea Nitrogen 15 7 - 30 mg/dL    Creatinine 0.90 0.66 - 1.25 mg/dL    GFR Estimate >90 >60 mL/min/[1.73_m2]    GFR Estimate If Black >90 >60 mL/min/[1.73_m2]    Calcium 9.3 8.5 - 10.1 mg/dL   Prostate spec antigen screen   Result Value Ref Range    PSA 0.33 0 - 4 ug/L       ASSESSMENT/PLAN:   Quang was seen today for physical.    Diagnoses and all orders for this visit:    Routine general medical examination at a health care facility  Patient was advised on recommended screening and preventive health recommendations.  He verbalized understanding and agreed to the plans below.    Open comedone  No inflammation, no induration.  Advised expectant management. Patient concurs.  Return precautions discussed and given to patient.    Benign essential hypertension  -     lisinopril (PRINIVIL/ZESTRIL) 5 MG tablet; Take 1 tablet (5 mg) by mouth daily  -     metoprolol tartrate (LOPRESSOR) 50 MG tablet; Take 1 tablet (50 mg) by mouth 2 times daily  -     triamterene-HCTZ (MAXZIDE-25) 37.5-25 MG tablet; Take 1 tablet by mouth daily  -     Basic metabolic panel  Controlled.  Low salt, low fat diet.   Exercise as tolerated.  Take meds as prescribed; call if with side effects.     Mixed hyperlipidemia  -     rosuvastatin (CRESTOR) 20 MG tablet; Take 1 tablet (20 mg) by mouth daily  -     Lipid panel reflex to direct LDL Fasting  Reinforced heart healthy lifestyle.    Non morbid obesity due to excess calories  Discussed risks of obesity: heart disease, stroke, end-organ damage, DM, decreased quality of life  Counselled on diet, exercise and weight loss regimen  "    Screening for prostate cancer  -     Prostate spec antigen screen        COUNSELING:  Reviewed preventive health counseling, as reflected in patient instructions       Regular exercise       Healthy diet/nutrition       Vision screening       Hearing screening       Aspirin Prophylaxsis       Alcohol Use       Safe sex practices/STD prevention       Colon cancer screening       Prostate cancer screening       Osteoporosis Prevention/Bone Health       The 10-year ASCVD risk score (Shalom DIALLO Jr., et al., 2013) is: 6.9%    Values used to calculate the score:      Age: 63 years      Sex: Male      Is Non- : No      Diabetic: No      Tobacco smoker: No      Systolic Blood Pressure: 112 mmHg      Is BP treated: Yes      HDL Cholesterol: 59 mg/dL      Total Cholesterol: 175 mg/dL       Advance Care Planning    Estimated body mass index is 33.01 kg/m  as calculated from the following:    Height as of this encounter: 1.772 m (5' 9.75\").    Weight as of this encounter: 103.6 kg (228 lb 6.4 oz).    Weight management plan: Discussed healthy diet and exercise guidelines     reports that he has never smoked. He has never used smokeless tobacco.      Counseling Resources:  ATP IV Guidelines  Pooled Cohorts Equation Calculator  FRAX Risk Assessment  ICSI Preventive Guidelines  Dietary Guidelines for Americans, 2010  USDA's MyPlate  ASA Prophylaxis  Lung CA Screening    Rafael Velasco MD  [facility] [place]  "

## 2020-07-24 ENCOUNTER — OFFICE VISIT (OUTPATIENT)
Dept: FAMILY MEDICINE | Facility: CLINIC | Age: 65
End: 2020-07-24
Payer: COMMERCIAL

## 2020-07-24 VITALS
DIASTOLIC BLOOD PRESSURE: 76 MMHG | HEART RATE: 54 BPM | RESPIRATION RATE: 16 BRPM | HEIGHT: 69 IN | WEIGHT: 227 LBS | SYSTOLIC BLOOD PRESSURE: 122 MMHG | BODY MASS INDEX: 33.62 KG/M2 | TEMPERATURE: 96.7 F | OXYGEN SATURATION: 97 %

## 2020-07-24 DIAGNOSIS — Z87.828 H/O BURNS: ICD-10-CM

## 2020-07-24 DIAGNOSIS — B35.1 TOENAIL FUNGUS: Primary | ICD-10-CM

## 2020-07-24 DIAGNOSIS — H61.23 BILATERAL IMPACTED CERUMEN: ICD-10-CM

## 2020-07-24 PROCEDURE — 69209 REMOVE IMPACTED EAR WAX UNI: CPT | Mod: RT | Performed by: NURSE PRACTITIONER

## 2020-07-24 PROCEDURE — 99214 OFFICE O/P EST MOD 30 MIN: CPT | Mod: 25 | Performed by: NURSE PRACTITIONER

## 2020-07-24 RX ORDER — TRIAMCINOLONE ACETONIDE 1 MG/G
OINTMENT TOPICAL 2 TIMES DAILY
Qty: 30 G | Refills: 1 | Status: SHIPPED | OUTPATIENT
Start: 2020-07-24 | End: 2022-01-10

## 2020-07-24 ASSESSMENT — MIFFLIN-ST. JEOR: SCORE: 1824.01
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
## 2020-08-03 ENCOUNTER — TELEPHONE (OUTPATIENT)
Dept: FAMILY MEDICINE | Facility: CLINIC | Age: 65
End: 2020-08-03

## 2020-08-03 NOTE — TELEPHONE ENCOUNTER
Patient notified. Patient verbalizes agreement and was scheduled for visit.      ALLISON TylerN, RN

## 2020-08-03 NOTE — TELEPHONE ENCOUNTER
Reason for Call: Request for an order:    Order being requested: MRI    Date needed: as soon as possible    Has the patient been seen by the PCP for this problem? YES?    Additional comments: Once order is in, patient's wife would like to schedule MRI for  patient    Phone number Patient can be reached at:  Cell number on file:    Telephone Information:   Mobile [phone]       Best Time:  Any    Can we leave a detailed message on this number?  YES    Call taken on 8/3/2020 at 2:09 PM by Azalea Benitez

## 2020-08-03 NOTE — TELEPHONE ENCOUNTER
Patient needs office visit for this new and developing concern. Better to have spouse present at visit since she has reported/observed symptoms.

## 2020-08-03 NOTE — TELEPHONE ENCOUNTER
ASSESSMENT COMPLETE. PATIENT DENIES NO SOB OR PAIN. SOB NOTED WITH TALKING, 02
2L/NC IN PLACE, RECOVERS WELL. PATIENT STATES "CAN'T SLEEP"; PATIENT RECEIVING
STERIODS. VSS. LUNGS VERY TIGHT WITH WHEEZE/DEMINISHED T/O. HYPO ACTIVE BT'S,
POOR APPETITE. WATER AND ENSURE AT BEDSIDE. ENCOURAGE TO DRINK AS MUCH AS HE
WANTS. BUTTOCKS AREA VERY SLIGHT RED; EDUCATED PATIENT. REPOSITIONS SELF IN
BED TO COMFORT. CONTINUE TO MONITOR AND TX PRN. Dr. Velasco,    I have spoken to the wife.  She tells of her  seeing you a year ago.  Recently he has had his eyes checked by [facility] and Dr. Olguin noted some cognitive deficit.  Sounds like maybe Alicia Awilda had noticed something too at last visit.  They are asking for a Brain MRI but he has had no base level work up yet.  How would like to proceed?  Office visit with you, neuro referral? Chanelle GONZALEZ RN

## 2020-08-04 NOTE — PROGRESS NOTES
"Subjective     Quang Gillespie is a 64 year old male who presents to clinic today for the following health issues:    HPI     Consult    Discuss memory issues, this was brought up during vision check.   Pt states that he has been misplacing items more frequently.  Would like to have his ears checked, has been using drops in left ear.      \"Chanelle Clemente RN  8/3/20 2:24 PM Note      Dr. Velasco,     I have spoken to the wife.  She tells of her  seeing you a year ago.  Recently he has had his eyes checked by [facility] and Dr. Olguin noted some cognitive deficit.  Sounds like maybe Alicia Shepard had noticed something too at last visit.  They are asking for a Brain MRI but he has had no base level work up yet.  How would like to proceed?  Office visit with you, neuro referral? Chanelle GONZALEZ RN\"        Further hx obtained by  Provider:    Patient states he has difficulty in performing various tasks he used to have no problems with.  Forgets what he needs to get from garage.  Has always been bad with names.  States he can perform simple math.  Denies getting lost or forgetting his destination while driving.   Spouse notices change in behavior/habit - forgetting to put down toilet seat and close lid after using toilet.  Per patient  and spouse, these have been present for the last year.   No previous evaluation.    Patient states for last 4 days he has been applying earwax drops to the left ear after unsuccessful aural irrigation last week.  He will be going for audiology testing next week.    Patient Active Problem List   Diagnosis     Mixed hyperlipidemia     Benign essential hypertension     Coronary artery disease involving native coronary artery of native heart without angina pectoris     Advanced directives, counseling/discussion     Impacted cerumen of both ears     Non morbid obesity due to excess calories     Past Surgical History:   Procedure Laterality Date     CARDIAC SURGERY      1 stentplaced "     COLONOSCOPY N/A 2/23/2018    Procedure: COLONOSCOPY;  colonoscopy;  Surgeon: Daniel Caldera MD;  Location: [facility]       Social History     Tobacco Use     Smoking status: Never Smoker     Smokeless tobacco: Never Used   Substance Use Topics     Alcohol use: Yes     Comment: occasional     Family History   Problem Relation Age of Onset     Cancer Mother         brain tumor     C.A.D. Mother      Alcohol/Drug Mother         smoker     Cancer Father         hodgkins     Alcohol/Drug Father         smoker         Current Outpatient Medications   Medication Sig Dispense Refill     aspirin 81 MG tablet Take 1 tablet by mouth daily.       lisinopril (PRINIVIL/ZESTRIL) 5 MG tablet Take 1 tablet (5 mg) by mouth daily 90 tablet 3     metoprolol tartrate (LOPRESSOR) 50 MG tablet Take 1 tablet (50 mg) by mouth 2 times daily 180 tablet 3     rosuvastatin (CRESTOR) 20 MG tablet Take 1 tablet (20 mg) by mouth daily 90 tablet 3     triamcinolone (KENALOG) 0.1 % external ointment Apply topically 2 times daily 30 g 1     triamterene-HCTZ (MAXZIDE-25) 37.5-25 MG tablet Take 1 tablet by mouth daily 90 tablet 3     No Known Allergies    Reviewed and updated as needed this visit by Provider         Review of Systems   Constitutional, HEENT, cardiovascular, pulmonary, GI, , musculoskeletal, neuro, skin, endocrine and psych systems are negative, except as otherwise noted.      Objective    /70   Pulse 58   Temp 97.8  F (36.6  C) (Tympanic)   Resp 12   Wt 102.2 kg (225 lb 6.4 oz)   SpO2 96%   BMI 33.05 kg/m    Body mass index is 33.05 kg/m .  Physical Exam   GENERAL: alert and no distress, ambulatory w/o assist  LEFT EAR: EAM with impacted cerumen, tympanic membrane intact and non-injected after cerumen evacuation, no effusion  RIGHT EAR: EAM clear, tympanic membrane intact and non-injected, no effusion  NECK: no tenderness, no adenopathy,  Thyroid not enlarged  RESP: lungs clear to auscultation - no rales, no rhonchi, no  wheezes  CV: regular rates and rhythm, no murmur  MS: no edema  SKIN: no suspicious lesions, no rashes  NEURO: Patient is A and O X 3; Cranial nerves 2-12 intact;  Strength 5/5 all extremities; DTR: ++ x 4; Romberg negative, able to tandem walk; Sensory grossly intact; fine tremors at rest, no tremors with intentional movement;  No problems with motor coordination      Diagnostic Test Results:  none         Assessment & Plan     Quang was seen today for consult.    Diagnoses and all orders for this visit:    Cognitive and behavioral changes  -     MR Brain w/o & w Contrast; Future  -     CBC with platelets differential  -     Lipid panel reflex to direct LDL Fasting  -     Vitamin D Deficiency  -     Vitamin B12  -     TSH with free T4 reflex  -     NEUROPSYCHOLOGY REFERRAL  No acute neurologic signs.  Patient and spouse were advised of possible causes.  DDx: senile dementia, alzheimer's, vascular, LBD, other forms of dementia, CVA  Discussed recommended initial work up. Patient concurred with the above.  Neuropsych referral.  Advised to engage in active mental activities.  Return precautions discussed and given to patient.  Neurology referral as appropriate.    Impacted cerumen of left ear  -     HC REMOVAL IMPACTED CERUMEN IRRIGATION/LVG UNILAT  Discussed with patient findings on exam.  Advised on treatment options; she concurred with aural irrigation today.  Patient was advised of procedure, expected outcome and possible risks.   CMA performed aural irrigation to both ears using warm tap water  with complete evacuation of cerumen from left ear.  Tympanic membrane visualized intact both ears.  Advised routine ear care.  Return precautions discussed and given to patient.         Patient Instructions   Schedule neuropsychology consult/evaluation at the soonest available time.    You will be contacted in 1-2 days for results of your lab tests.    To schedule the brain MRI, call [phone].    Further  recommendations to be given once results are out.    It  Is still unclear what type of condition is present, if any.  Possible chanelle various forms of dementia.  Hence, the evaluation above.    Patient Education     Understanding Dementia    Dementia is the name for a group of brain conditions that make it harder to remember, reason, and communicate. The most common form of dementia is Alzheimer disease. Other types include vascular dementia, frontotemporal dementia, and Lewy body dementia. Years ago, dementia was often called  senility.  It was even thought to be a normal part of aging. We now know that it s not normal. It s caused by ongoing damage to cells in the brain.  Symptoms of dementia  Symptoms differ depending on which parts of the brain are affected and the stage of the disease. The most common symptoms include:    Memory loss, including trouble with directions and familiar tasks    Language problems, such as trouble getting words out or understanding what is said    Trouble with planning, organizing, concentration, and judgment. This includes people not being able to recognize their own symptoms.    Changes in behavior and personality  How dementia affects the brain  The brain controls all the workings of the mind and body. Some parts of the brain control memory and language. Other parts control movement and coordination. With dementia, nerve cells in the brain are gradually damaged or destroyed. Why this happens is not yet clear. But over time, parts of the brain begin to shrink (atrophy). This often starts in the part of the brain that controls memory, reasoning, and personality. Other parts of the brain may not be affected until much later in the illness.  The stages of dementia  Dementia is a progressive disease. This means it gets worse over time. Symptoms differ for each person, but there are 3 basic stages. Each may last from months to years:    Early stage. A person may seem forgetful, confused,  or have changes in behavior. However, he or she may still be able to handle most tasks without help.    Middle stage. More and more help is needed with daily tasks. A person may have trouble recognizing friends and family members, wander, or get lost in familiar places. He or she may also become restless or rodriges.    Late stage. Dementia can cause severe problems with memory, judgment, and other skills. Help is needed with nearly every aspect of daily life.  Treating dementia  Right now, there s no cure for dementia. But with proper care, many people can live comfortably for years:     Medicines are a key part of treatment. Some types can help slow the progression of symptoms, such as memory loss. Others can help ease mood, behavior, and sleep problems. These medicines work for some people but not all.    Activity and exercise are good for body and mind. They may even help slow the progression of the disease. Simple, repetitive activities are good choices.    Regular healthcare provider visits help keep track of symptoms and overall health.    The sleep-wake cycle can be mixed up in people with dementia. They may function better being up at nighttime and sleeping during the daytime.      Social interactions are important to maintain.    Date Last Reviewed: 3/1/2018    2816-4745 [employer]. [address] [place] [address], [place] [address]. All rights reserved. This information is not intended as a substitute for professional medical care. Always follow your healthcare professional's instructions.           Patient Education       Earwax, Home Treatment    Everyone produces earwax from the lining of the ear canal. It serves to lubricate and protect the ear. The wax that forms in the canal naturally moves toward the outside of the ear and falls out. Sometimes the ear canal may contain too much wax. This can cause a blockage and loss of hearing. Directions are given below for home treatment.  Home care  If your  doctor has advised you to remove a wax blockage yourself, follow these directions:    Unless a medicine was prescribed, you may use an over-the-counter product made for clearing earwax. These contain carbamide peroxide. Lie down with the blocked ear facing upward. Apply one dropper full of medicine and wait a few minutes. Grasp the outer ear and wiggle it to help the solution enter the canal.    Lean over a sink or basin with the blocked ear facing downward. Use a bulb syringe filled with warm (not hot or cold) water to rinse the ear several times. Use gentle pressure only.    If you are having trouble draining the water out of your ear canal, put a few drops of rubbing alcohol (isopropyl alcohol) into the ear canal. This will help remove the remaining water.    Repeat this procedure once a day for up to three days, or until your hearing is back to normal. Do not use this treatment for more than three days in a row.  Don ts    Don t use cold water to rinse the ear. This will make you dizzy.    Don t perform this procedure if you have an ear infection.    Don t perform this procedure if you have a ruptured eardrum.    Don t use cotton swabs, matches, hairpins, keys, or other objects to  clean  the ear canal. This can cause infection of the ear canal or rupture the eardrum. Because of their size and shape, cotton swabs can push earwax deeper into the ear canal instead of removing it.  Follow-up care  Follow up with your health care provider if you are not improving after three cleaning attempts, or as advised.  When to seek medical advice  Call your health care provider right away if any of these occur:    Worsening ear pain    Fever of 101 F (38.3 C) or higher, or as directed by your health care provider    Hearing does not return to normal after three days of treatment    Fluid drainage or bleeding from the ear canal    Swelling, redness, or tenderness of the outer ear    Headache, neck pain, or stiff neck     1525-4851 [employer]. [address] [place] [address], [place] [address]. All rights reserved. This information is not intended as a substitute for professional medical care. Always follow your healthcare professional's instructions.               No follow-ups on file.    Rafael Velasco MD  [facility]

## 2020-08-06 ENCOUNTER — OFFICE VISIT (OUTPATIENT)
Dept: FAMILY MEDICINE | Facility: CLINIC | Age: 65
End: 2020-08-06
Payer: COMMERCIAL

## 2020-08-06 VITALS
RESPIRATION RATE: 12 BRPM | WEIGHT: 225.4 LBS | BODY MASS INDEX: 33.05 KG/M2 | OXYGEN SATURATION: 96 % | DIASTOLIC BLOOD PRESSURE: 70 MMHG | SYSTOLIC BLOOD PRESSURE: 126 MMHG | TEMPERATURE: 97.8 F | HEART RATE: 58 BPM

## 2020-08-06 DIAGNOSIS — R46.89 COGNITIVE AND BEHAVIORAL CHANGES: Primary | ICD-10-CM

## 2020-08-06 DIAGNOSIS — R41.89 COGNITIVE AND BEHAVIORAL CHANGES: Primary | ICD-10-CM

## 2020-08-06 DIAGNOSIS — H61.22 IMPACTED CERUMEN OF LEFT EAR: ICD-10-CM

## 2020-08-06 LAB
BASOPHILS # BLD AUTO: 0 10E9/L (ref 0–0.2)
BASOPHILS NFR BLD AUTO: 0.4 %
CHOLEST SERPL-MCNC: 167 MG/DL
DIFFERENTIAL METHOD BLD: NORMAL
EOSINOPHIL # BLD AUTO: 0.2 10E9/L (ref 0–0.7)
EOSINOPHIL NFR BLD AUTO: 2.3 %
ERYTHROCYTE [DISTWIDTH] IN BLOOD BY AUTOMATED COUNT: 12.8 % (ref 10–15)
HCT VFR BLD AUTO: 48.5 % (ref 40–53)
HDLC SERPL-MCNC: 54 MG/DL
HGB BLD-MCNC: 16.9 G/DL (ref 13.3–17.7)
LDLC SERPL CALC-MCNC: 92 MG/DL
LYMPHOCYTES # BLD AUTO: 1.4 10E9/L (ref 0.8–5.3)
LYMPHOCYTES NFR BLD AUTO: 19.5 %
MCH RBC QN AUTO: 30.4 PG (ref 26.5–33)
MCHC RBC AUTO-ENTMCNC: 34.8 G/DL (ref 31.5–36.5)
MCV RBC AUTO: 87 FL (ref 78–100)
MONOCYTES # BLD AUTO: 0.6 10E9/L (ref 0–1.3)
MONOCYTES NFR BLD AUTO: 7.7 %
NEUTROPHILS # BLD AUTO: 5.2 10E9/L (ref 1.6–8.3)
NEUTROPHILS NFR BLD AUTO: 70.1 %
NONHDLC SERPL-MCNC: 113 MG/DL
PLATELET # BLD AUTO: 190 10E9/L (ref 150–450)
RBC # BLD AUTO: 5.56 10E12/L (ref 4.4–5.9)
TRIGL SERPL-MCNC: 105 MG/DL
TSH SERPL DL<=0.005 MIU/L-ACNC: 1.25 MU/L (ref 0.4–4)
VIT B12 SERPL-MCNC: 323 PG/ML (ref 193–986)
WBC # BLD AUTO: 7.4 10E9/L (ref 4–11)

## 2020-08-06 PROCEDURE — 99214 OFFICE O/P EST MOD 30 MIN: CPT | Mod: 25 | Performed by: FAMILY MEDICINE

## 2020-08-06 PROCEDURE — 84443 ASSAY THYROID STIM HORMONE: CPT | Performed by: FAMILY MEDICINE

## 2020-08-06 PROCEDURE — 80061 LIPID PANEL: CPT | Performed by: FAMILY MEDICINE

## 2020-08-06 PROCEDURE — 82607 VITAMIN B-12: CPT | Performed by: FAMILY MEDICINE

## 2020-08-06 PROCEDURE — 69209 REMOVE IMPACTED EAR WAX UNI: CPT | Mod: 50 | Performed by: FAMILY MEDICINE

## 2020-08-06 PROCEDURE — 85025 COMPLETE CBC W/AUTO DIFF WBC: CPT | Performed by: FAMILY MEDICINE

## 2020-08-06 PROCEDURE — 82306 VITAMIN D 25 HYDROXY: CPT | Performed by: FAMILY MEDICINE

## 2020-08-06 PROCEDURE — 36415 COLL VENOUS BLD VENIPUNCTURE: CPT | Performed by: FAMILY MEDICINE

## 2020-08-06 NOTE — NURSING NOTE
"Initial /70   Pulse 58   Temp 97.8  F (36.6  C) (Tympanic)   Resp 12   Wt 102.2 kg (225 lb 6.4 oz)   SpO2 96%   BMI 33.05 kg/m   Estimated body mass index is 33.05 kg/m  as calculated from the following:    Height as of 7/24/20: 1.759 m (5' 9.25\").    Weight as of this encounter: 102.2 kg (225 lb 6.4 oz). .      "

## 2020-08-06 NOTE — PATIENT INSTRUCTIONS
Schedule neuropsychology consult/evaluation at the soonest available time.    You will be contacted in 1-2 days for results of your lab tests.    To schedule the brain MRI, call [phone].    Further recommendations to be given once results are out.    It  Is still unclear what type of condition is present, if any.  Possible chanelle various forms of dementia.  Hence, the evaluation above.    Patient Education     Understanding Dementia    Dementia is the name for a group of brain conditions that make it harder to remember, reason, and communicate. The most common form of dementia is Alzheimer disease. Other types include vascular dementia, frontotemporal dementia, and Lewy body dementia. Years ago, dementia was often called  senility.  It was even thought to be a normal part of aging. We now know that it s not normal. It s caused by ongoing damage to cells in the brain.  Symptoms of dementia  Symptoms differ depending on which parts of the brain are affected and the stage of the disease. The most common symptoms include:    Memory loss, including trouble with directions and familiar tasks    Language problems, such as trouble getting words out or understanding what is said    Trouble with planning, organizing, concentration, and judgment. This includes people not being able to recognize their own symptoms.    Changes in behavior and personality  How dementia affects the brain  The brain controls all the workings of the mind and body. Some parts of the brain control memory and language. Other parts control movement and coordination. With dementia, nerve cells in the brain are gradually damaged or destroyed. Why this happens is not yet clear. But over time, parts of the brain begin to shrink (atrophy). This often starts in the part of the brain that controls memory, reasoning, and personality. Other parts of the brain may not be affected until much later in the illness.  The stages of dementia  Dementia is a  progressive disease. This means it gets worse over time. Symptoms differ for each person, but there are 3 basic stages. Each may last from months to years:    Early stage. A person may seem forgetful, confused, or have changes in behavior. However, he or she may still be able to handle most tasks without help.    Middle stage. More and more help is needed with daily tasks. A person may have trouble recognizing friends and family members, wander, or get lost in familiar places. He or she may also become restless or rodriges.    Late stage. Dementia can cause severe problems with memory, judgment, and other skills. Help is needed with nearly every aspect of daily life.  Treating dementia  Right now, there s no cure for dementia. But with proper care, many people can live comfortably for years:     Medicines are a key part of treatment. Some types can help slow the progression of symptoms, such as memory loss. Others can help ease mood, behavior, and sleep problems. These medicines work for some people but not all.    Activity and exercise are good for body and mind. They may even help slow the progression of the disease. Simple, repetitive activities are good choices.    Regular healthcare provider visits help keep track of symptoms and overall health.    The sleep-wake cycle can be mixed up in people with dementia. They may function better being up at nighttime and sleeping during the daytime.      Social interactions are important to maintain.    Date Last Reviewed: 3/1/2018    5854-6844 [employer]. [address] [place] [address]. All rights reserved. This information is not intended as a substitute for professional medical care. Always follow your healthcare professional's instructions.           Patient Education       Earwax, Home Treatment    Everyone produces earwax from the lining of the ear canal. It serves to lubricate and protect the ear. The wax that forms in the canal naturally  moves toward the outside of the ear and falls out. Sometimes the ear canal may contain too much wax. This can cause a blockage and loss of hearing. Directions are given below for home treatment.  Home care  If your doctor has advised you to remove a wax blockage yourself, follow these directions:    Unless a medicine was prescribed, you may use an over-the-counter product made for clearing earwax. These contain carbamide peroxide. Lie down with the blocked ear facing upward. Apply one dropper full of medicine and wait a few minutes. Grasp the outer ear and wiggle it to help the solution enter the canal.    Lean over a sink or basin with the blocked ear facing downward. Use a bulb syringe filled with warm (not hot or cold) water to rinse the ear several times. Use gentle pressure only.    If you are having trouble draining the water out of your ear canal, put a few drops of rubbing alcohol (isopropyl alcohol) into the ear canal. This will help remove the remaining water.    Repeat this procedure once a day for up to three days, or until your hearing is back to normal. Do not use this treatment for more than three days in a row.  Don ts    Don t use cold water to rinse the ear. This will make you dizzy.    Don t perform this procedure if you have an ear infection.    Don t perform this procedure if you have a ruptured eardrum.    Don t use cotton swabs, matches, hairpins, keys, or other objects to  clean  the ear canal. This can cause infection of the ear canal or rupture the eardrum. Because of their size and shape, cotton swabs can push earwax deeper into the ear canal instead of removing it.  Follow-up care  Follow up with your health care provider if you are not improving after three cleaning attempts, or as advised.  When to seek medical advice  Call your health care provider right away if any of these occur:    Worsening ear pain    Fever of 101 F (38.3 C) or higher, or as directed by your health care  provider    Hearing does not return to normal after three days of treatment    Fluid drainage or bleeding from the ear canal    Swelling, redness, or tenderness of the outer ear    Headache, neck pain, or stiff neck    8199-3661 [employer]. [address] [place] [address], [place] [address]. All rights reserved. This information is not intended as a substitute for professional medical care. Always follow your healthcare professional's instructions.

## 2020-08-07 LAB — DEPRECATED CALCIDIOL+CALCIFEROL SERPL-MC: 38 UG/L (ref 20–75)

## 2020-08-14 ENCOUNTER — HOSPITAL ENCOUNTER (OUTPATIENT)
Dept: MRI IMAGING | Facility: CLINIC | Age: 65
Discharge: HOME | End: 2020-08-14
Attending: FAMILY MEDICINE | Admitting: FAMILY MEDICINE
Payer: COMMERCIAL

## 2020-08-14 DIAGNOSIS — R46.89 COGNITIVE AND BEHAVIORAL CHANGES: ICD-10-CM

## 2020-08-14 DIAGNOSIS — R41.89 COGNITIVE AND BEHAVIORAL CHANGES: ICD-10-CM

## 2020-08-14 PROCEDURE — 70553 MRI BRAIN STEM W/O & W/DYE: CPT

## 2020-08-14 PROCEDURE — A9585 GADOBUTROL INJECTION: HCPCS | Performed by: FAMILY MEDICINE

## 2020-08-14 PROCEDURE — 25500064 ZZH RX 255 OP 636: Performed by: FAMILY MEDICINE

## 2020-08-14 RX ORDER — GADOBUTROL 604.72 MG/ML
10 INJECTION INTRAVENOUS ONCE
Status: COMPLETED | OUTPATIENT
Start: 2020-08-14 | End: 2020-08-14

## 2020-08-14 RX ADMIN — GADOBUTROL 10 ML: 604.72 INJECTION INTRAVENOUS at 20:09

## 2020-09-01 ENCOUNTER — OFFICE VISIT (OUTPATIENT)
Dept: FAMILY MEDICINE | Facility: CLINIC | Age: 65
End: 2020-09-01
Payer: COMMERCIAL

## 2020-09-01 VITALS
BODY MASS INDEX: 33.49 KG/M2 | OXYGEN SATURATION: 99 % | DIASTOLIC BLOOD PRESSURE: 70 MMHG | RESPIRATION RATE: 12 BRPM | WEIGHT: 228.4 LBS | SYSTOLIC BLOOD PRESSURE: 130 MMHG | TEMPERATURE: 96.6 F | HEART RATE: 72 BPM

## 2020-09-01 DIAGNOSIS — L08.9 ABRASION OF RIGHT LOWER LEG WITH INFECTION, INITIAL ENCOUNTER: Primary | ICD-10-CM

## 2020-09-01 DIAGNOSIS — L25.8 CONTACT DERMATITIS DUE TO OTHER AGENT, UNSPECIFIED CONTACT DERMATITIS TYPE: ICD-10-CM

## 2020-09-01 DIAGNOSIS — S80.811A ABRASION OF RIGHT LOWER LEG WITH INFECTION, INITIAL ENCOUNTER: Primary | ICD-10-CM

## 2020-09-01 PROCEDURE — 99213 OFFICE O/P EST LOW 20 MIN: CPT | Performed by: FAMILY MEDICINE

## 2020-09-01 RX ORDER — TRIAMCINOLONE ACETONIDE 5 MG/G
1 OINTMENT TOPICAL 2 TIMES DAILY
Qty: 1 TUBE | Refills: 1 | Status: SHIPPED | OUTPATIENT
Start: 2020-09-01 | End: 2022-01-10

## 2020-09-01 RX ORDER — CEPHALEXIN 500 MG/1
500 CAPSULE ORAL 3 TIMES DAILY
Qty: 30 CAPSULE | Refills: 0 | Status: SHIPPED | OUTPATIENT
Start: 2020-09-01 | End: 2022-01-10

## 2020-09-01 NOTE — PROGRESS NOTES
SUBJECTIVE:                                                    Quang Gillespie is 64 year old male   Chief Complaint   Patient presents with     Derm Problem     Rash      Duration: 1 1/2 weeks    Description  Location: right lower leg  Itching: no    Intensity:  moderate    Accompanying signs and symptoms: None    History (similar episodes/previous evaluation): None    Precipitating or alleviating factors:  New exposures:  None  Recent travel: no      Therapies tried and outcome: none      History as above- rash started about a week ago - started as small spots and appears spreading. No associated itching. He did mention that he went camping recently. No draining from the red spots.       Problem list and histories reviewed & adjusted, as indicated.  Additional history: as documented    Patient Active Problem List   Diagnosis     Mixed hyperlipidemia     Benign essential hypertension     Coronary artery disease involving native coronary artery of native heart without angina pectoris     Advanced directives, counseling/discussion     Impacted cerumen of both ears     Non morbid obesity due to excess calories     Past Surgical History:   Procedure Laterality Date     CARDIAC SURGERY      1 stentplaced     COLONOSCOPY N/A 2/23/2018    Procedure: COLONOSCOPY;  colonoscopy;  Surgeon: Daniel Caldera MD;  Location: [facility]       Social History     Tobacco Use     Smoking status: Never Smoker     Smokeless tobacco: Never Used   Substance Use Topics     Alcohol use: Yes     Comment: occasional     Family History   Problem Relation Age of Onset     Cancer Mother         brain tumor     C.A.D. Mother      Alcohol/Drug Mother         smoker     Cancer Father         hodgkins     Alcohol/Drug Father         smoker         Current Outpatient Medications   Medication Sig Dispense Refill     aspirin 81 MG tablet Take 1 tablet by mouth daily.       cephALEXin (KEFLEX) 500 MG capsule Take 1 capsule (500 mg) by mouth 3 times daily  30 capsule 0     lisinopril (PRINIVIL/ZESTRIL) 5 MG tablet Take 1 tablet (5 mg) by mouth daily 90 tablet 3     metoprolol tartrate (LOPRESSOR) 50 MG tablet Take 1 tablet (50 mg) by mouth 2 times daily 180 tablet 3     rosuvastatin (CRESTOR) 20 MG tablet Take 1 tablet (20 mg) by mouth daily 90 tablet 3     triamcinolone (KENALOG) 0.1 % external ointment Apply topically 2 times daily 30 g 1     triamcinolone (KENALOG) 0.5 % external ointment Apply 1 g topically 2 times daily Dispense 45 g tube please 1 Tube 1     triamterene-HCTZ (MAXZIDE-25) 37.5-25 MG tablet Take 1 tablet by mouth daily 90 tablet 3     No Known Allergies  Recent Labs   Lab Test 08/06/20  1442 08/27/19  0824 07/31/18  0704  06/07/16  0605  10/14/13   LDL 92 85 81   < > 86   < >  --    HDL 54 59 51   < > 49   < >  --    TRIG 105 154* 91   < > 122   < >  --    ALT  --   --   --   --  23  --  31   CR  --  0.90 0.96   < > 0.96   < > 1.1   GFRESTIMATED  --  >90 80   < > 80   < >  --    GFRESTBLACK  --  >90 >90   < > >90  African American GFR Calc     < >  --    POTASSIUM  --  4.1 3.9   < > 3.9   < > 4.1   TSH 1.25  --   --   --   --   --   --     < > = values in this interval not displayed.      BP Readings from Last 3 Encounters:   09/01/20 130/70   08/06/20 126/70   07/24/20 122/76    Wt Readings from Last 3 Encounters:   09/01/20 103.6 kg (228 lb 6.4 oz)   08/06/20 102.2 kg (225 lb 6.4 oz)   07/24/20 103 kg (227 lb)         ROS:  Constitutional, HEENT, cardiovascular, pulmonary, gi and gu systems are negative, except as otherwise noted.    OBJECTIVE:                                                    /70   Pulse 72   Temp 96.6  F (35.9  C) (Tympanic)   Resp 12   Wt 103.6 kg (228 lb 6.4 oz)   SpO2 99%   BMI 33.49 kg/m    GENERAL APPEARANCE ADULT: Alert, no acute distress  SKIN: rash present, type:maculopapular, appearance: red, location: right, anterior leg, localized  Diagnostic Test Results:  none      ASSESSMENT/PLAN:                                                     1. Abrasion of right lower leg with infection, initial encounter  Rash - appears to be getting infected. Abrasion seen redness spreading   - cephALEXin (KEFLEX) 500 MG capsule; Take 1 capsule (500 mg) by mouth 3 times daily  Dispense: 30 capsule; Refill: 0    2. Contact dermatitis due to other agent, unspecified contact dermatitis type  - triamcinolone (KENALOG) 0.5 % external ointment; Apply 1 g topically 2 times daily Dispense 45 g tube please  Dispense: 1 Tube; Refill: 1    Traeutselam Henley MD  [facility]

## 2020-09-01 NOTE — PATIENT INSTRUCTIONS
"  Patient Education     Contact Dermatitis  Contact dermatitis is a skin rash caused by something that touches the skin and makes it irritated and inflamed. Your skin may be red, swollen, dry, and may be cracked. Blisters may form and ooze. The rash will itch.  Contact dermatitis can form on the face and neck, backs of hands, forearms, genitals, and lower legs.  People can get contact dermatitis from lots of sources. These include:    Plants such as poison ivy, oak, or sumac    Chemicals in hair dyes and rinses, soaps, solvents, waxes, fingernail polish, and deodorants     Jewelry or watchbands made of nickel  Contact dermatitis is not passed from person to person.  Talk with your healthcare provider about what may have caused the rash. A type of allergy testing called \"patch testing\" may be used to discover what you are allergic to. You will need to avoid the source of your rash in the future to prevent it from coming back.  Treatment is done to relieve itching and prevent the rash from coming back. The rash should go away in a few days to a few weeks.  Home care  Your healthcare provider may prescribe medicine to relieve swelling and itching. Follow all instructions when using these medicines.  General care:    Avoid anything that heats up your skin, such as hot showers or baths, or direct sunlight. This can make itching worse.    Apply cold compresses to soothe your sores to help relieve your symptoms. Do this for 30 minutes 3 to 4 times a day. You can make a cold compress by soaking a cloth in cold water. Squeeze out excess water. You can add colloidal oatmeal to the water to help reduce itching. For severe itching in a small area, apply an ice pack wrapped in a thin towel. Do this for 20 minutes 3 to 4 times a day.    You can also try wet dressings. One way to do this is to wear a wet piece of clothing under a dry one. Wear a damp shirt under a dry shirt if your upper body is affected. This can relieve itching " and prevent you from scratching the affected area.    You can also help relieve large areas of itching by taking a lukewarm bath with colloidal oatmeal added to the water.    Use hydrocortisone cream for redness and irritation, unless another medicine was prescribed. You can also use benzocaine anesthetic cream or spray. Calamine lotion can also relieve mild symptoms.    Use oral diphenhydramine to help reduce itching. You can buy this antihistamine at drug and grocery stores. It can make you sleepy, so use lower doses during the daytime. Or you can use loratadine. This is an antihistamine that will not make you sleepy. Do not use diphenhydramine if you have glaucoma or have trouble urinating due to an enlarged prostate.    If a plant causes your rash, make sure to wash your skin and the clothes you were wearing when you came into contact with the plant. This is to wash away the plant oils that gave you the rash and prevent more or worse symptoms.    Stay away from the substance or object that causes your symptoms. If you can t avoid it, wear gloves or some other type of protection.  Follow-up care  Follow up with your healthcare provider, or as advised.  When to seek medical advice  Call your healthcare provider right away if any of these occur:    Spreading of the rash to other parts of your body    Severe swelling of your face, eyelids, mouth, throat or tongue    Trouble urinating due to swelling in the genital area    Fever of 100.4 F (38 C) or higher    Redness or swelling that gets worse    Pain that gets worse    Foul-smelling fluid leaking from the skin    Yellow-brown crusts on the open blisters  Date Last Reviewed: 9/1/2016 2000-2019 [employer]. [address] [place] [address], [place] [address]. All rights reserved. This information is not intended as a substitute for professional medical care. Always follow your healthcare professional's instructions.

## 2020-09-29 DIAGNOSIS — I10 BENIGN ESSENTIAL HYPERTENSION: ICD-10-CM

## 2020-09-29 DIAGNOSIS — E78.2 MIXED HYPERLIPIDEMIA: ICD-10-CM

## 2020-09-29 NOTE — LETTER
[facility]  [address] [place] [address]  Phone: [phone]    October 2, 2020       Quang Gillespie  [id number] [address]            Dear Quang:    We are concerned about your health care.  We recently provided you with medication refills.  Lab tests are required every year in order to continue refilling your Rosuvastatin.  Orders have been placed in our computer and should be accessible at most [facility]. Please complete this fasting lab work as soon as possible. You could get your flu shot then too.        Thank You,      Rafael Velasco MD / Chanelle GONZALEZ RN

## 2020-09-29 NOTE — TELEPHONE ENCOUNTER
"Requested Prescriptions   Pending Prescriptions Disp Refills     rosuvastatin (CRESTOR) 20 MG tablet [Pharmacy Med Name: ROSUVASTATIN CALCIUM 20 MG 20 Tablet] 30 tablet 3     Sig: TAKE 1 TABLET (20 MG) BY MOUTH DAILY       Statins Protocol Passed - 9/29/2020  2:04 PM        Passed - LDL on file in past 12 months     Recent Labs   Lab Test 08/06/20  1442   LDL 92             Passed - No abnormal creatine kinase in past 12 months     No lab results found.             Passed - Recent (12 mo) or future (30 days) visit within the authorizing provider's specialty     Patient has had an office visit with the authorizing provider or a provider within the authorizing providers department within the previous 12 mos or has a future within next 30 days. See \"Patient Info\" tab in inbasket, or \"Choose Columns\" in Meds & Orders section of the refill encounter.              Passed - Medication is active on med list        Passed - Patient is age 18 or older           metoprolol tartrate (LOPRESSOR) 50 MG tablet [Pharmacy Med Name: METOPROLOL TARTRATE 50 MG T 50 Tablet] 60 tablet 3     Sig: TAKE 1 TABLET (50 MG) BY MOUTH 2 TIMES DAILY       Beta-Blockers Protocol Passed - 9/29/2020  2:04 PM        Passed - Blood pressure under 140/90 in past 12 months     BP Readings from Last 3 Encounters:   09/01/20 130/70   08/06/20 126/70   07/24/20 122/76                 Passed - Patient is age 6 or older        Passed - Recent (12 mo) or future (30 days) visit within the authorizing provider's specialty     Patient has had an office visit with the authorizing provider or a provider within the authorizing providers department within the previous 12 mos or has a future within next 30 days. See \"Patient Info\" tab in inbasket, or \"Choose Columns\" in Meds & Orders section of the refill encounter.              Passed - Medication is active on med list           lisinopril (ZESTRIL) 5 MG tablet [Pharmacy Med Name: LISINOPRIL 5 MG TABS 5 Tablet] 30 " "tablet 3     Sig: TAKE 1 TABLET (5 MG) BY MOUTH DAILY       ACE Inhibitors (Including Combos) Protocol Failed - 9/29/2020  2:04 PM        Failed - Normal serum creatinine on file in past 12 months     Recent Labs   Lab Test 08/27/19  0824   CR 0.90       Ok to refill medication if creatinine is low          Failed - Normal serum potassium on file in past 12 months     Recent Labs   Lab Test 08/27/19  0824   POTASSIUM 4.1             Passed - Blood pressure under 140/90 in past 12 months     BP Readings from Last 3 Encounters:   09/01/20 130/70   08/06/20 126/70   07/24/20 122/76                 Passed - Recent (12 mo) or future (30 days) visit within the authorizing provider's specialty     Patient has had an office visit with the authorizing provider or a provider within the authorizing providers department within the previous 12 mos or has a future within next 30 days. See \"Patient Info\" tab in inbasket, or \"Choose Columns\" in Meds & Orders section of the refill encounter.              Passed - Medication is active on med list        Passed - Patient is age 18 or older           triamterene-HCTZ (MAXZIDE-25) 37.5-25 MG tablet [Pharmacy Med Name: TRIAMTERENE-HCTZ 37.5-25 MG 37.5-25 Tablet] 30 tablet 3     Sig: TAKE 1 TABLET BY MOUTH DAILY       Diuretics (Including Combos) Protocol Failed - 9/29/2020  2:04 PM        Failed - Normal serum creatinine on file in past 12 months     Recent Labs   Lab Test 08/27/19  0824   CR 0.90           Failed - Normal serum potassium on file in past 12 months     Recent Labs   Lab Test 08/27/19  0824   POTASSIUM 4.1           Failed - Normal serum sodium on file in past 12 months     Recent Labs   Lab Test 08/27/19  0824                 Passed - Blood pressure under 140/90 in past 12 months     BP Readings from Last 3 Encounters:   09/01/20 130/70   08/06/20 126/70   07/24/20 122/76           Passed - Recent (12 mo) or future (30 days) visit within the authorizing provider's " "specialty     Patient has had an office visit with the authorizing provider or a provider within the authorizing providers department within the previous 12 mos or has a future within next 30 days. See \"Patient Info\" tab in inbasket, or \"Choose Columns\" in Meds & Orders section of the refill encounter.              Passed - Medication is active on med list        Passed - Patient is age 18 or older             "

## 2020-10-01 NOTE — TELEPHONE ENCOUNTER
Patient called and agreed to make an appointment with Dr. Velasco for his med refills. Azalea Benitez on 10/1/2020 at 3:20 PM

## 2020-10-01 NOTE — TELEPHONE ENCOUNTER
Routing refill request to provider for review/approval because:  Labs not current:  Per below and   Patient needs to be seen because it has been more than 1 year since last office visit.    Cassie Baker RN

## 2020-10-02 RX ORDER — LISINOPRIL 5 MG/1
5 TABLET ORAL DAILY
Qty: 90 TABLET | Refills: 3 | Status: SHIPPED | OUTPATIENT
Start: 2020-10-02 | End: 2020-10-05

## 2020-10-02 RX ORDER — METOPROLOL TARTRATE 50 MG
50 TABLET ORAL 2 TIMES DAILY
Qty: 180 TABLET | Refills: 3 | Status: SHIPPED | OUTPATIENT
Start: 2020-10-02 | End: 2020-10-05

## 2020-10-02 RX ORDER — ROSUVASTATIN CALCIUM 20 MG/1
20 TABLET, COATED ORAL DAILY
Qty: 90 TABLET | Refills: 3 | Status: SHIPPED | OUTPATIENT
Start: 2020-10-02 | End: 2020-10-05

## 2020-10-02 RX ORDER — TRIAMTERENE/HYDROCHLOROTHIAZID 37.5-25 MG
1 TABLET ORAL DAILY
Qty: 90 TABLET | Refills: 3 | Status: SHIPPED | OUTPATIENT
Start: 2020-10-02 | End: 2020-10-05

## 2020-10-02 NOTE — PROGRESS NOTES
3  SUBJECTIVE:   CC: Quang Gillespie is an 64 year old male who presents for preventive health visit.       Patient has been advised of split billing requirements and indicates understanding: Yes  Healthy Habits:    Do you get at least three servings of calcium containing foods daily (dairy, green leafy vegetables, etc.)? yes    Amount of exercise or daily activities, outside of work: Just lots of yard work     Problems taking medications regularly No    Medication side effects: No    Have you had an eye exam in the past two years? yes    Do you see a dentist twice per year? no    Do you have sleep apnea, excessive snoring or daytime drowsiness?no    Medication Followup of medications - would like transfer to [facility]    Taking Medication as prescribed: yes    Side Effects:  None    Medication Helping Symptoms:  yes       Today's PHQ-2 Score:   PHQ-2 ( 1999 [employer]) 7/24/2020 8/27/2019   Q1: Little interest or pleasure in doing things 0 0   Q2: Feeling down, depressed or hopeless 0 0   PHQ-2 Score 0 0       Abuse: Current or Past(Physical, Sexual or Emotional)- No  Do you feel safe in your environment? Yes    Have you ever done Advance Care Planning? (For example, a Health Directive, POLST, or a discussion with a medical provider or your loved ones about your wishes): No, advance care planning information given to patient to review.  Patient plans to discuss their wishes with loved ones or provider.      Social History     Tobacco Use     Smoking status: Never Smoker     Smokeless tobacco: Never Used   Substance Use Topics     Alcohol use: Yes     Comment: occasional     If you drink alcohol do you typically have >3 drinks per day or >7 drinks per week? No                      Last PSA:   PSA   Date Value Ref Range Status   08/27/2019 0.33 0 - 4 ug/L Final     Comment:     Assay Method:  Chemiluminescence using Siemens Vista analyzer       Reviewed orders with patient. Reviewed health maintenance  and updated orders accordingly - Yes  BP Readings from Last 3 Encounters:   10/05/20 136/80   09/01/20 130/70   08/06/20 126/70    Wt Readings from Last 3 Encounters:   10/05/20 105.2 kg (232 lb)   09/01/20 103.6 kg (228 lb 6.4 oz)   08/06/20 102.2 kg (225 lb 6.4 oz)                  Patient Active Problem List   Diagnosis     Mixed hyperlipidemia     Benign essential hypertension     Coronary artery disease involving native coronary artery of native heart without angina pectoris     Advanced directives, counseling/discussion     Impacted cerumen of both ears     Non morbid obesity due to excess calories     Past Surgical History:   Procedure Laterality Date     CARDIAC SURGERY      1 stentplaced     COLONOSCOPY N/A 2/23/2018    Procedure: COLONOSCOPY;  colonoscopy;  Surgeon: Daniel Caldera MD;  Location: [facility]       Social History     Tobacco Use     Smoking status: Never Smoker     Smokeless tobacco: Never Used   Substance Use Topics     Alcohol use: Yes     Comment: occasional     Family History   Problem Relation Age of Onset     Cancer Mother         brain tumor     C.A.D. Mother      Alcohol/Drug Mother         smoker     Cancer Father         hodgkins     Alcohol/Drug Father         smoker         Current Outpatient Medications   Medication Sig Dispense Refill     aspirin 81 MG tablet Take 1 tablet by mouth daily.       lisinopril (ZESTRIL) 5 MG tablet Take 1 tablet (5 mg) by mouth daily 90 tablet 3     metoprolol tartrate (LOPRESSOR) 50 MG tablet Take 1 tablet (50 mg) by mouth 2 times daily 180 tablet 3     rosuvastatin (CRESTOR) 20 MG tablet Take 1 tablet (20 mg) by mouth daily 90 tablet 3     triamcinolone (KENALOG) 0.1 % external ointment Apply topically 2 times daily 30 g 1     triamcinolone (KENALOG) 0.5 % external ointment Apply 1 g topically 2 times daily Dispense 45 g tube please 1 Tube 1     triamterene-HCTZ (MAXZIDE-25) 37.5-25 MG tablet Take 1 tablet by mouth daily 90 tablet 3     cephALEXin  "(KEFLEX) 500 MG capsule Take 1 capsule (500 mg) by mouth 3 times daily (Patient not taking: Reported on 10/5/2020) 30 capsule 0     No Known Allergies    Reviewed and updated as needed this visit by clinical staff  Tobacco  Allergies  Meds  Problems  Med Hx  Surg Hx  Fam Hx  Soc Hx          Reviewed and updated as needed this visit by Provider  Tobacco  Allergies  Meds  Problems  Med Hx  Surg Hx  Fam Hx         Past Medical History:   Diagnosis Date     Heart disease     stent 2011     Hyperlipidemia LDL goal < 130      Hypertension       Past Surgical History:   Procedure Laterality Date     CARDIAC SURGERY      1 stentplaced     COLONOSCOPY N/A 2/23/2018    Procedure: COLONOSCOPY;  colonoscopy;  Surgeon: Daniel Caldera MD;  Location: [facility]       ROS:  CONSTITUTIONAL: NEGATIVE for fever, chills, change in weight  INTEGUMENTARY/SKIN: NEGATIVE for worrisome rashes, moles or lesions  EYES: NEGATIVE for vision changes or irritation  ENT: NEGATIVE for ear, mouth and throat problems  RESP: NEGATIVE for significant cough or SOB  CV: NEGATIVE for chest pain, palpitations or peripheral edema  GI: NEGATIVE for nausea, abdominal pain, heartburn, or change in bowel habits   male: negative for dysuria, hematuria, decreased urinary stream, erectile dysfunction, urethral discharge  MUSCULOSKELETAL: NEGATIVE for significant arthralgias or myalgia  NEURO: NEGATIVE for weakness, dizziness or paresthesias  ENDOCRINE: NEGATIVE for temperature intolerance, skin/hair changes  HEME/ALLERGY/IMMUNE: NEGATIVE for bleeding problems  PSYCHIATRIC: NEGATIVE for changes in mood or affect    OBJECTIVE:   /80   Pulse 80   Temp 98.8  F (37.1  C) (Tympanic)   Ht 1.772 m (5' 9.75\")   Wt 105.2 kg (232 lb)   BMI 33.53 kg/m    EXAM:  GENERAL APPEARANCE: healthy, alert and no distress  EYES: pink conj, no icterus, PERRL, EOMI  HENT: ear canals and TM's normal, nose and mouth without ulcers or lesions, oropharynx clear and oral " "mucous membranes moist  NECK: no adenopathy, no asymmetry, masses, or scars and thyroid normal to palpation  RESP: lungs clear to auscultation - no rales, rhonchi or wheezes  CV: regular rates and rhythm, normal S1 S2, no S3 or S4, no murmur, click or rub, no peripheral edema and peripheral pulses strong  ABDOMEN: soft, nontender, no hepatosplenomegaly, no masses and bowel sounds normal  RECTAL: deferred  MS: no musculoskeletal defects are noted and gait is age appropriate without ataxia  SKIN: no suspicious lesions or rashes  NEURO: Normal strength and tone, sensory exam grossly normal, mentation intact and speech normal    Diagnostic Test Results:  none     ASSESSMENT/PLAN:   Quang was seen today for physical and flu shot.    Diagnoses and all orders for this visit:    Routine general medical examination at a health care facility    Benign essential hypertension  -     triamterene-HCTZ (MAXZIDE-25) 37.5-25 MG tablet; Take 1 tablet by mouth daily  -     metoprolol tartrate (LOPRESSOR) 50 MG tablet; Take 1 tablet (50 mg) by mouth 2 times daily  -     lisinopril (ZESTRIL) 5 MG tablet; Take 1 tablet (5 mg) by mouth daily    Mixed hyperlipidemia  -     rosuvastatin (CRESTOR) 20 MG tablet; Take 1 tablet (20 mg) by mouth daily    Need for prophylactic vaccination and inoculation against influenza  -     INFLUENZA QUAD, RECOMBINANT, P-FREE (RIV4) (FLUBLOCK) [13625]  -     Vaccine Administration, Initial [90500]      Patient's rxs were transferred to [facility] pharmacy per his request.    Patient has been advised of split billing requirements and indicates understanding: Yes  COUNSELING:  Reviewed preventive health counseling, as reflected in patient instructions    Estimated body mass index is 33.53 kg/m  as calculated from the following:    Height as of this encounter: 1.772 m (5' 9.75\").    Weight as of this encounter: 105.2 kg (232 lb).    Weight management plan: Discussed healthy diet and exercise " guidelines    He reports that he has never smoked. He has never used smokeless tobacco.      Counseling Resources:  ATP IV Guidelines  Pooled Cohorts Equation Calculator  FRAX Risk Assessment  ICSI Preventive Guidelines  Dietary Guidelines for Americans, 2010  USDA's MyPlate  ASA Prophylaxis  Lung CA Screening    Rafael Velasco MD  [facility]

## 2020-10-02 NOTE — PATIENT INSTRUCTIONS
Be consistent with low salt, low trans fat and low saturated fat diet.  Eat food rich in omega-3-fatty acids as you tolerate. (salmon, olive oil)  Eat 5 cups of vegetables, fruits and whole grains per day.  Limit starchy food (white rice, white bread, white pasta, white potatoes) to less than a cup per meal.  Minimize sweets, junk food and fastfood. Limit soda beverages to one serving per day; best to avoid it altogether though.    Exercise: moderate intensity sustained for at least 30 mins per episode, goal of 150 mins per week at least  Combine cardiovascular and resistance exercises.  These exercise recommendations are in addition to your daily activity at work or home.  Work on losing weight.      Get a flu shot every year by end of October.    You may get the Shingrix vaccine for shingles if you desire, and after you verify with insurance how they cover the vaccine.    Preventive Health Recommendations  Male Ages 50 - 64    Yearly exam:             See your health care provider every year in order to  o   Review health changes.   o   Discuss preventive care.    o   Review your medicines if your doctor has prescribed any.     Have a cholesterol test every 5 years, or more frequently if you are at risk for high cholesterol/heart disease.     Have a diabetes test (fasting glucose) every three years. If you are at risk for diabetes, you should have this test more often.     Have a colonoscopy at age 50, or have a yearly FIT test (stool test). These exams will check for colon cancer.      Talk with your health care provider about whether or not a prostate cancer screening test (PSA) is right for you.    You should be tested each year for STDs (sexually transmitted diseases), if you re at risk.     Shots: Get a flu shot each year. Get a tetanus shot every 10 years.     Nutrition:    Eat at least 5 servings of fruits and vegetables daily.     Eat whole-grain bread, whole-wheat pasta and brown rice instead of white  grains and rice.     Get adequate Calcium and Vitamin D.     Lifestyle    Exercise for at least 150 minutes a week (30 minutes a day, 5 days a week). This will help you control your weight and prevent disease.     Limit alcohol to one drink per day.     No smoking.     Wear sunscreen to prevent skin cancer.     See your dentist every six months for an exam and cleaning.     See your eye doctor every 1 to 2 years.

## 2020-10-05 ENCOUNTER — OFFICE VISIT (OUTPATIENT)
Dept: FAMILY MEDICINE | Facility: CLINIC | Age: 65
End: 2020-10-05
Payer: COMMERCIAL

## 2020-10-05 VITALS
HEART RATE: 80 BPM | TEMPERATURE: 98.8 F | WEIGHT: 232 LBS | BODY MASS INDEX: 33.21 KG/M2 | DIASTOLIC BLOOD PRESSURE: 80 MMHG | SYSTOLIC BLOOD PRESSURE: 136 MMHG | HEIGHT: 70 IN

## 2020-10-05 DIAGNOSIS — I10 BENIGN ESSENTIAL HYPERTENSION: ICD-10-CM

## 2020-10-05 DIAGNOSIS — Z23 NEED FOR PROPHYLACTIC VACCINATION AND INOCULATION AGAINST INFLUENZA: ICD-10-CM

## 2020-10-05 DIAGNOSIS — Z00.00 ROUTINE GENERAL MEDICAL EXAMINATION AT A HEALTH CARE FACILITY: Primary | ICD-10-CM

## 2020-10-05 DIAGNOSIS — E78.2 MIXED HYPERLIPIDEMIA: ICD-10-CM

## 2020-10-05 PROCEDURE — 99396 PREV VISIT EST AGE 40-64: CPT | Mod: 25 | Performed by: FAMILY MEDICINE

## 2020-10-05 PROCEDURE — 90682 RIV4 VACC RECOMBINANT DNA IM: CPT | Performed by: FAMILY MEDICINE

## 2020-10-05 PROCEDURE — 90471 IMMUNIZATION ADMIN: CPT | Performed by: FAMILY MEDICINE

## 2020-10-05 RX ORDER — ROSUVASTATIN CALCIUM 20 MG/1
20 TABLET, COATED ORAL DAILY
Qty: 90 TABLET | Refills: 3 | Status: SHIPPED | OUTPATIENT
Start: 2020-10-05 | End: 2021-12-21

## 2020-10-05 RX ORDER — LISINOPRIL 5 MG/1
5 TABLET ORAL DAILY
Qty: 90 TABLET | Refills: 3 | Status: SHIPPED | OUTPATIENT
Start: 2020-10-05 | End: 2021-12-21

## 2020-10-05 RX ORDER — TRIAMTERENE/HYDROCHLOROTHIAZID 37.5-25 MG
1 TABLET ORAL DAILY
Qty: 90 TABLET | Refills: 3 | Status: SHIPPED | OUTPATIENT
Start: 2020-10-05 | End: 2021-12-21

## 2020-10-05 RX ORDER — METOPROLOL TARTRATE 50 MG
50 TABLET ORAL 2 TIMES DAILY
Qty: 180 TABLET | Refills: 3 | Status: SHIPPED | OUTPATIENT
Start: 2020-10-05 | End: 2021-12-21

## 2020-10-05 ASSESSMENT — MIFFLIN-ST. JEOR: SCORE: 1854.63

## 2020-10-05 NOTE — NURSING NOTE
"Injectable Influenza Immunization Documentation    1.  Has the patient received the information for the injectable influenza vaccine? YES     2. Is the patient 6 months of age or older? YES     3. Does the patient have any of the following contraindications?         Severe allergy to eggs? No     Severe allergic reaction to previous influenza vaccines? No   Severe allergy to latex? No       History of Guillain-[place] syndrome? No     Currently have a temperature greater than 100.4F? No        4.  Severely egg allergic patients should have flu vaccine eligibility assessed by an MD, RN, or pharmacist, and those who received flu vaccine should be observed for 15 min by an MD, RN, Pharmacist, Medical Technician, or member of clinic staff.\": YES    5. Latex-allergic patients should be given latex-free influenza vaccine Yes. Please reference the Vaccine latex table to determine if your clinic s product is latex-containing.       Vaccination given by Evaristo Bateman M.A.          "

## 2020-10-05 NOTE — NURSING NOTE
"Chief Complaint   Patient presents with     Physical       Initial BP (!) 142/78 (BP Location: Right arm, Patient Position: Chair, Cuff Size: Adult Large)   Pulse 80   Temp 98.8  F (37.1  C) (Tympanic)   Ht 1.772 m (5' 9.75\")   Wt 105.2 kg (232 lb)   BMI 33.53 kg/m   Estimated body mass index is 33.53 kg/m  as calculated from the following:    Height as of this encounter: 1.772 m (5' 9.75\").    Weight as of this encounter: 105.2 kg (232 lb).    Patient presents to the clinic using No DME    Health Maintenance that is potentially due pending provider review:  Flu shot- declined and shingles- decline at this time    n/a    Is there anyone who you would like to be able to receive your results? No  If yes have patient fill out ELISABETH  Evaristo Bateman M.A.      "

## 2021-04-05 ENCOUNTER — IMMUNIZATION (OUTPATIENT)
Dept: FAMILY MEDICINE | Facility: CLINIC | Age: 66
End: 2021-04-05
Payer: COMMERCIAL

## 2021-04-05 PROCEDURE — 0011A PR COVID VAC MODERNA 100 MCG/0.5 ML IM: CPT

## 2021-04-05 PROCEDURE — 91301 PR COVID VAC MODERNA 100 MCG/0.5 ML IM: CPT

## 2021-05-03 ENCOUNTER — IMMUNIZATION (OUTPATIENT)
Dept: FAMILY MEDICINE | Facility: CLINIC | Age: 66
End: 2021-05-03
Attending: FAMILY MEDICINE
Payer: COMMERCIAL

## 2021-05-03 PROCEDURE — 0012A PR COVID VAC MODERNA 100 MCG/0.5 ML IM: CPT

## 2021-05-03 PROCEDURE — 91301 PR COVID VAC MODERNA 100 MCG/0.5 ML IM: CPT

## 2021-10-02 ENCOUNTER — HEALTH MAINTENANCE LETTER (OUTPATIENT)
Age: 66
End: 2021-10-02

## 2021-11-27 ENCOUNTER — HEALTH MAINTENANCE LETTER (OUTPATIENT)
Age: 66
End: 2021-11-27

## 2021-12-18 DIAGNOSIS — I10 BENIGN ESSENTIAL HYPERTENSION: ICD-10-CM

## 2021-12-18 DIAGNOSIS — E78.2 MIXED HYPERLIPIDEMIA: ICD-10-CM

## 2021-12-20 NOTE — TELEPHONE ENCOUNTER
"Requested Prescriptions   Pending Prescriptions Disp Refills     triamterene-HCTZ (MAXZIDE-25) 37.5-25 MG tablet [Pharmacy Med Name: TRIAMTERENE-HCTZ 37.5-25MG TABS] 90 tablet 3     Sig: TAKE ONE TABLET BY MOUTH ONCE DAILY       Diuretics (Including Combos) Protocol Failed - 12/18/2021 11:19 AM        Failed - Blood pressure under 140/90 in past 12 months     BP Readings from Last 3 Encounters:   10/05/20 136/80   09/01/20 130/70   08/06/20 126/70                 Failed - Normal serum creatinine on file in past 12 months     Recent Labs   Lab Test 08/27/19  0824   CR 0.90              Failed - Normal serum potassium on file in past 12 months     Recent Labs   Lab Test 08/27/19  0824   POTASSIUM 4.1                    Failed - Normal serum sodium on file in past 12 months     Recent Labs   Lab Test 08/27/19  0824                 Passed - Recent (12 mo) or future (30 days) visit within the authorizing provider's specialty     Patient has had an office visit with the authorizing provider or a provider within the authorizing providers department within the previous 12 mos or has a future within next 30 days. See \"Patient Info\" tab in inbasket, or \"Choose Columns\" in Meds & Orders section of the refill encounter.              Passed - Medication is active on med list        Passed - Patient is age 18 or older           lisinopril (ZESTRIL) 5 MG tablet [Pharmacy Med Name: LISINOPRIL 5MG TABS] 90 tablet 3     Sig: TAKE ONE TABLET BY MOUTH ONCE DAILY       ACE Inhibitors (Including Combos) Protocol Failed - 12/18/2021 11:19 AM        Failed - Blood pressure under 140/90 in past 12 months     BP Readings from Last 3 Encounters:   10/05/20 136/80   09/01/20 130/70   08/06/20 126/70                 Failed - Normal serum creatinine on file in past 12 months     Recent Labs   Lab Test 08/27/19  0824   CR 0.90       Ok to refill medication if creatinine is low          Failed - Normal serum potassium on file in past 12 " "months     Recent Labs   Lab Test 08/27/19  0824   POTASSIUM 4.1             Passed - Recent (12 mo) or future (30 days) visit within the authorizing provider's specialty     Patient has had an office visit with the authorizing provider or a provider within the authorizing providers department within the previous 12 mos or has a future within next 30 days. See \"Patient Info\" tab in inbasket, or \"Choose Columns\" in Meds & Orders section of the refill encounter.              Passed - Medication is active on med list        Passed - Patient is age 18 or older           metoprolol tartrate (LOPRESSOR) 50 MG tablet [Pharmacy Med Name: METOPROLOL TARTRATE 50MG TABS] 180 tablet 3     Sig: TAKE ONE TABLET BY MOUTH TWICE A DAY       Beta-Blockers Protocol Failed - 12/18/2021 11:19 AM        Failed - Blood pressure under 140/90 in past 12 months     BP Readings from Last 3 Encounters:   10/05/20 136/80   09/01/20 130/70   08/06/20 126/70                 Passed - Patient is age 6 or older        Passed - Recent (12 mo) or future (30 days) visit within the authorizing provider's specialty     Patient has had an office visit with the authorizing provider or a provider within the authorizing providers department within the previous 12 mos or has a future within next 30 days. See \"Patient Info\" tab in inbasket, or \"Choose Columns\" in Meds & Orders section of the refill encounter.              Passed - Medication is active on med list           rosuvastatin (CRESTOR) 20 MG tablet [Pharmacy Med Name: ROSUVASTATIN CALCIUM 20MG TABS] 90 tablet 3     Sig: TAKE ONE TABLET BY MOUTH ONCE DAILY       Statins Protocol Failed - 12/18/2021 11:19 AM        Failed - LDL on file in past 12 months     Recent Labs   Lab Test 08/06/20  1442   LDL 92             Passed - No abnormal creatine kinase in past 12 months     No lab results found.             Passed - Recent (12 mo) or future (30 days) visit within the authorizing provider's specialty     " "Patient has had an office visit with the authorizing provider or a provider within the authorizing providers department within the previous 12 mos or has a future within next 30 days. See \"Patient Info\" tab in inbasket, or \"Choose Columns\" in Meds & Orders section of the refill encounter.              Passed - Medication is active on med list        Passed - Patient is age 18 or older             "

## 2021-12-21 RX ORDER — TRIAMTERENE/HYDROCHLOROTHIAZID 37.5-25 MG
1 TABLET ORAL DAILY
Qty: 90 TABLET | Refills: 0 | Status: SHIPPED | OUTPATIENT
Start: 2021-12-21 | End: 2022-01-10

## 2021-12-22 RX ORDER — LISINOPRIL 5 MG/1
5 TABLET ORAL DAILY
Qty: 90 TABLET | Refills: 0 | Status: SHIPPED | OUTPATIENT
Start: 2021-12-22 | End: 2022-01-10

## 2021-12-22 RX ORDER — ROSUVASTATIN CALCIUM 20 MG/1
20 TABLET, COATED ORAL DAILY
Qty: 90 TABLET | Refills: 0 | Status: SHIPPED | OUTPATIENT
Start: 2021-12-22 | End: 2022-01-10

## 2021-12-22 RX ORDER — METOPROLOL TARTRATE 50 MG
50 TABLET ORAL 2 TIMES DAILY
Qty: 180 TABLET | Refills: 0 | Status: SHIPPED | OUTPATIENT
Start: 2021-12-22 | End: 2022-01-10

## 2022-01-05 ASSESSMENT — ENCOUNTER SYMPTOMS
COUGH: 0
MYALGIAS: 1
EYE PAIN: 0
HEARTBURN: 0
HEMATOCHEZIA: 0
NAUSEA: 0
FEVER: 0
DIZZINESS: 0
NERVOUS/ANXIOUS: 0
ABDOMINAL PAIN: 0
WEAKNESS: 0
DYSURIA: 0
ARTHRALGIAS: 0
FREQUENCY: 0
SORE THROAT: 0
HEMATURIA: 0
SHORTNESS OF BREATH: 0
DIARRHEA: 0
PALPITATIONS: 0
HEADACHES: 0
CHILLS: 0
CONSTIPATION: 0
PARESTHESIAS: 0
JOINT SWELLING: 0

## 2022-01-10 ENCOUNTER — OFFICE VISIT (OUTPATIENT)
Dept: FAMILY MEDICINE | Facility: CLINIC | Age: 67
End: 2022-01-10
Payer: COMMERCIAL

## 2022-01-10 VITALS
HEIGHT: 70 IN | DIASTOLIC BLOOD PRESSURE: 84 MMHG | OXYGEN SATURATION: 99 % | WEIGHT: 230.2 LBS | HEART RATE: 105 BPM | RESPIRATION RATE: 14 BRPM | TEMPERATURE: 97.3 F | SYSTOLIC BLOOD PRESSURE: 128 MMHG | BODY MASS INDEX: 32.96 KG/M2

## 2022-01-10 DIAGNOSIS — Z00.00 ROUTINE GENERAL MEDICAL EXAMINATION AT A HEALTH CARE FACILITY: Primary | ICD-10-CM

## 2022-01-10 DIAGNOSIS — I10 BENIGN ESSENTIAL HYPERTENSION: ICD-10-CM

## 2022-01-10 DIAGNOSIS — Z23 NEED FOR PROPHYLACTIC VACCINATION AND INOCULATION AGAINST INFLUENZA: ICD-10-CM

## 2022-01-10 DIAGNOSIS — E78.2 MIXED HYPERLIPIDEMIA: ICD-10-CM

## 2022-01-10 DIAGNOSIS — Z23 HIGH PRIORITY FOR 2019-NCOV VACCINE: ICD-10-CM

## 2022-01-10 DIAGNOSIS — E66.9 NON MORBID OBESITY, UNSPECIFIED OBESITY TYPE: ICD-10-CM

## 2022-01-10 DIAGNOSIS — R05.3 CHRONIC COUGH: ICD-10-CM

## 2022-01-10 LAB
ALBUMIN SERPL-MCNC: 3.7 G/DL (ref 3.4–5)
ALP SERPL-CCNC: 87 U/L (ref 40–150)
ALT SERPL W P-5'-P-CCNC: 38 U/L (ref 0–70)
ANION GAP SERPL CALCULATED.3IONS-SCNC: 3 MMOL/L (ref 3–14)
AST SERPL W P-5'-P-CCNC: 29 U/L (ref 0–45)
BILIRUB SERPL-MCNC: 1 MG/DL (ref 0.2–1.3)
BUN SERPL-MCNC: 21 MG/DL (ref 7–30)
CALCIUM SERPL-MCNC: 9.7 MG/DL (ref 8.5–10.1)
CHLORIDE BLD-SCNC: 100 MMOL/L (ref 94–109)
CHOLEST SERPL-MCNC: 185 MG/DL
CO2 SERPL-SCNC: 32 MMOL/L (ref 20–32)
CREAT SERPL-MCNC: 0.96 MG/DL (ref 0.66–1.25)
FASTING STATUS PATIENT QL REPORTED: YES
GFR SERPL CREATININE-BSD FRML MDRD: 88 ML/MIN/1.73M2
GLUCOSE BLD-MCNC: 117 MG/DL (ref 70–99)
HDLC SERPL-MCNC: 50 MG/DL
LDLC SERPL CALC-MCNC: 103 MG/DL
NONHDLC SERPL-MCNC: 135 MG/DL
POTASSIUM BLD-SCNC: 4.5 MMOL/L (ref 3.4–5.3)
PROT SERPL-MCNC: 7.7 G/DL (ref 6.8–8.8)
SODIUM SERPL-SCNC: 135 MMOL/L (ref 133–144)
TRIGL SERPL-MCNC: 159 MG/DL

## 2022-01-10 PROCEDURE — 0064A COVID-19,PF,MODERNA (18+ YRS BOOSTER .25ML): CPT | Performed by: FAMILY MEDICINE

## 2022-01-10 PROCEDURE — 90471 IMMUNIZATION ADMIN: CPT | Performed by: FAMILY MEDICINE

## 2022-01-10 PROCEDURE — 80061 LIPID PANEL: CPT | Performed by: FAMILY MEDICINE

## 2022-01-10 PROCEDURE — 90682 RIV4 VACC RECOMBINANT DNA IM: CPT | Performed by: FAMILY MEDICINE

## 2022-01-10 PROCEDURE — 36415 COLL VENOUS BLD VENIPUNCTURE: CPT | Performed by: FAMILY MEDICINE

## 2022-01-10 PROCEDURE — 99214 OFFICE O/P EST MOD 30 MIN: CPT | Mod: 25 | Performed by: FAMILY MEDICINE

## 2022-01-10 PROCEDURE — 99396 PREV VISIT EST AGE 40-64: CPT | Mod: 25 | Performed by: FAMILY MEDICINE

## 2022-01-10 PROCEDURE — 91306 COVID-19,PF,MODERNA (18+ YRS BOOSTER .25ML): CPT | Performed by: FAMILY MEDICINE

## 2022-01-10 PROCEDURE — 80053 COMPREHEN METABOLIC PANEL: CPT | Performed by: FAMILY MEDICINE

## 2022-01-10 RX ORDER — TRIAMTERENE/HYDROCHLOROTHIAZID 37.5-25 MG
1 TABLET ORAL DAILY
Qty: 90 TABLET | Refills: 3 | Status: SHIPPED | OUTPATIENT
Start: 2022-01-10 | End: 2022-12-14

## 2022-01-10 RX ORDER — ROSUVASTATIN CALCIUM 20 MG/1
20 TABLET, COATED ORAL DAILY
Qty: 90 TABLET | Refills: 3 | Status: SHIPPED | OUTPATIENT
Start: 2022-01-10 | End: 2022-12-14

## 2022-01-10 RX ORDER — LISINOPRIL 5 MG/1
5 TABLET ORAL DAILY
Qty: 90 TABLET | Refills: 0 | Status: SHIPPED | OUTPATIENT
Start: 2022-01-10 | End: 2022-07-27

## 2022-01-10 RX ORDER — FAMOTIDINE 40 MG/1
40 TABLET, FILM COATED ORAL AT BEDTIME
Qty: 30 TABLET | Refills: 0 | Status: SHIPPED | OUTPATIENT
Start: 2022-01-10 | End: 2022-06-01

## 2022-01-10 RX ORDER — METOPROLOL TARTRATE 50 MG
50 TABLET ORAL 2 TIMES DAILY
Qty: 180 TABLET | Refills: 0 | Status: SHIPPED | OUTPATIENT
Start: 2022-01-10 | End: 2022-07-27

## 2022-01-10 ASSESSMENT — ENCOUNTER SYMPTOMS
HEADACHES: 0
HEARTBURN: 0
COUGH: 0
JOINT SWELLING: 0
PARESTHESIAS: 0
HEMATOCHEZIA: 0
CHILLS: 0
SORE THROAT: 0
FEVER: 0
ABDOMINAL PAIN: 0
CONSTIPATION: 0
DYSURIA: 0
SHORTNESS OF BREATH: 0
DIZZINESS: 0
MYALGIAS: 1
DIARRHEA: 0
FREQUENCY: 0
NERVOUS/ANXIOUS: 0
WEAKNESS: 0
NAUSEA: 0
PALPITATIONS: 0
EYE PAIN: 0
ARTHRALGIAS: 0
HEMATURIA: 0

## 2022-01-10 ASSESSMENT — MIFFLIN-ST. JEOR: SCORE: 1832.49

## 2022-01-10 NOTE — PATIENT INSTRUCTIONS
Start famotidine 40 mg orally every night for up to 4 weeks.  Possible silent acid reflux causing the cough.  Schedule virtual visit in 1 month to go over your symptom again.    You may get the Shingrix vaccine for shingles if you desire, and after you verify with insurance how they cover the vaccine.    Be consistent with low salt, low trans fat and low saturated fat diet.  Eat food rich in omega-3-fatty acids as you tolerate. (salmon, olive oil)  Eat 5 cups of vegetables, fruits and whole grains per day.  Limit starchy food (white rice, white bread, white pasta, white potatoes) to less than a cup per meal.  Minimize sweets, junk food and fastfood. Limit soda beverages to one serving per day; best to avoid it altogether though.    Exercise: moderate intensity sustained for at least 30 mins per episode, goal of 150 mins per week at least  Combine cardiovascular and resistance exercises.  These exercise recommendations are in addition to your daily activity at work or home.  Work on losing weight.    Blood tests: You will be contacted in 1-2 days for results of your lab tests.    Preventative Care Visits include: Yearly physicals, Well-child visits, Welcome to Medicare visits, & Medicare yearly wellness exams.    The purpose of these visits is to discuss your medical history and prevent health problems before you are sick.  You may need to pay a copay, coinsurance or deductible if your visit today includes testing or treating for a new or existing condition.    Additional charges may be incurred for today's visit. If you have questions about what your insurance plan covers, please contact your Insurance Company's member service department.  If you have questions specific to a bill you have already received from [facility], please contact the [facility] Billing Office at [phone].     Preventive Health Recommendations  Male Ages 50 - 64    Yearly exam:             See your health care provider every year in order  to  o   Review health changes.   o   Discuss preventive care.    o   Review your medicines if your doctor has prescribed any.     Have a cholesterol test every 5 years, or more frequently if you are at risk for high cholesterol/heart disease.     Have a diabetes test (fasting glucose) every three years. If you are at risk for diabetes, you should have this test more often.     Have a colonoscopy at age 50, or have a yearly FIT test (stool test). These exams will check for colon cancer.      Talk with your health care provider about whether or not a prostate cancer screening test (PSA) is right for you.    You should be tested each year for STDs (sexually transmitted diseases), if you re at risk.     Shots: Get a flu shot each year. Get a tetanus shot every 10 years.     Nutrition:    Eat at least 5 servings of fruits and vegetables daily.     Eat whole-grain bread, whole-wheat pasta and brown rice instead of white grains and rice.     Get adequate Calcium and Vitamin D.     Lifestyle    Exercise for at least 150 minutes a week (30 minutes a day, 5 days a week). This will help you control your weight and prevent disease.     Limit alcohol to one drink per day.     No smoking.     Wear sunscreen to prevent skin cancer.     See your dentist every six months for an exam and cleaning.     See your eye doctor every 1 to 2 years.

## 2022-01-10 NOTE — PROGRESS NOTES
SUBJECTIVE:   CC: Quang Gillespie is an 66 year old male who presents for preventative health visit.     Patient has been advised of split billing requirements and indicates understanding: Yes  Healthy Habits:     Getting at least 3 servings of Calcium per day:  NO    Bi-annual eye exam:  Yes    Dental care twice a year:  NO    Sleep apnea or symptoms of sleep apnea:  None    Diet:  Regular (no restrictions)    Frequency of exercise:  1 day/week    Taking medications regularly:  Yes    Medication side effects:  None    PHQ-2 Total Score: 0    Additional concerns today:  No    Feeling of phlegm in his throat, has to clear all the time. Occurring for the last several months.  Patient said it waxes and wanes. Some days not bad.  Denies feeling of acid reflux. However, patient may have increased symptoms at night.  Denies rhinorrhea or nasal congestion.    Medication Followup of all meds    Taking Medication as prescribed: yes    Side Effects:  None known to pt    Medication Helping Symptoms:  yes     Today's PHQ-2 Score:   PHQ-2 ( 1999 Pf[employer]) 1/5/2022   Q1: Little interest or pleasure in doing things 0   Q2: Feeling down, depressed or hopeless 0   PHQ-2 Score 0   PHQ-2 Total Score (12-17 Years)- Positive if 3 or more points; Administer PHQ-A if positive -   Q1: Little interest or pleasure in doing things Not at all   Q2: Feeling down, depressed or hopeless Not at all   PHQ-2 Score 0       Abuse: Current or Past(Physical, Sexual or Emotional)- No  Do you feel safe in your environment? Yes      Social History     Tobacco Use     Smoking status: Never Smoker     Smokeless tobacco: Never Used   Substance Use Topics     Alcohol use: Yes     Comment: Very seldom     If you drink alcohol do you typically have >3 drinks per day or >7 drinks per week? No    Alcohol Use 1/10/2022   Prescreen: >3 drinks/day or >7 drinks/week? -   Prescreen: >3 drinks/day or >7 drinks/week? No       Last PSA:   PSA   Date Value Ref Range Status    08/27/2019 0.33 0 - 4 ug/L Final     Comment:     Assay Method:  Chemiluminescence using Siemens Vista analyzer       Reviewed orders with patient. Reviewed health maintenance and updated orders accordingly - Yes  Patient Active Problem List   Diagnosis     Mixed hyperlipidemia     Benign essential hypertension     Coronary artery disease involving native coronary artery of native heart without angina pectoris     Advanced directives, counseling/discussion     Impacted cerumen of both ears     Non morbid obesity due to excess calories     Past Surgical History:   Procedure Laterality Date     CARDIAC SURGERY      1 stentplaced     COLONOSCOPY N/A 2/23/2018    Procedure: COLONOSCOPY;  colonoscopy;  Surgeon: Daniel Caldera MD;  Location: [facility]       Social History     Tobacco Use     Smoking status: Never Smoker     Smokeless tobacco: Never Used   Substance Use Topics     Alcohol use: Yes     Comment: Very seldom     Family History   Problem Relation Age of Onset     Cancer Mother         brain tumor     C.A.D. Mother      Alcohol/Drug Mother         smoker     Cancer Father         hodgkins     Alcohol/Drug Father         smoker     Other Cancer Father          Current Outpatient Medications   Medication Sig Dispense Refill     aspirin 81 MG tablet Take 1 tablet by mouth daily.       lisinopril (ZESTRIL) 5 MG tablet Take 1 tablet (5 mg) by mouth daily NEEDS LAB AND CLINIC APPOINTMENT 90 tablet 0     metoprolol tartrate (LOPRESSOR) 50 MG tablet Take 1 tablet (50 mg) by mouth 2 times daily NEEDS LAB AND CLINIC APPOINTMENT 180 tablet 0     rosuvastatin (CRESTOR) 20 MG tablet Take 1 tablet (20 mg) by mouth daily NEEDS LAB AND CLINIC APPOINTMENT 90 tablet 0     triamterene-HCTZ (MAXZIDE-25) 37.5-25 MG tablet Take 1 tablet by mouth daily NEEDS LAB AND CLINIC APPOINTMENT 90 tablet 0     No Known Allergies    Reviewed and updated as needed this visit by clinical staff  Tobacco  Allergies  Meds   Med Hx  Surg Hx  Fam  "Hx  Soc Hx       Reviewed and updated as needed this visit by Provider               Past Medical History:   Diagnosis Date     Heart disease     stent 2011     Hyperlipidemia LDL goal < 130      Hypertension       Past Surgical History:   Procedure Laterality Date     CARDIAC SURGERY      1 stentplaced     COLONOSCOPY N/A 2/23/2018    Procedure: COLONOSCOPY;  colonoscopy;  Surgeon: Daniel Caldera MD;  Location: [facility]       Review of Systems   Constitutional: Negative for chills and fever.   HENT: Positive for hearing loss. Negative for congestion, ear pain and sore throat.    Eyes: Positive for visual disturbance. Negative for pain.   Respiratory: Negative for cough and shortness of breath.    Cardiovascular: Negative for chest pain, palpitations and peripheral edema.   Gastrointestinal: Negative for abdominal pain, constipation, diarrhea, heartburn, hematochezia and nausea.   Genitourinary: Negative for dysuria, frequency, genital sores, hematuria, impotence, penile discharge and urgency.   Musculoskeletal: Positive for myalgias. Negative for arthralgias and joint swelling.   Skin: Negative for rash.   Neurological: Negative for dizziness, weakness, headaches and paresthesias.   Psychiatric/Behavioral: Negative for mood changes. The patient is not nervous/anxious.        OBJECTIVE:   /84   Pulse 105   Temp 97.3  F (36.3  C) (Tympanic)   Resp 14   Ht 1.765 m (5' 9.5\")   Wt 104.4 kg (230 lb 3.2 oz)   SpO2 99%   BMI 33.51 kg/m      Physical Exam  GENERAL APPEARANCE:  Obese, alert and no distress  EYES: pink conj, no icterus, PERRL, EOMI  HENT: ear canals and TM's normal, nose and mouth without ulcers or lesions, oropharynx clear and oral mucous membranes moist  NECK: no adenopathy, no asymmetry, masses, or scars and thyroid normal to palpation  RESP: lungs clear to auscultation - no rales, rhonchi or wheezes  CV: regular rates and rhythm, normal S1 S2, no S3 or S4, no murmur, click or rub, no " peripheral edema and peripheral pulses strong  ABDOMEN: soft, nontender, no hepatosplenomegaly, no masses and bowel sounds normal  RECTAL: deferred  MS: no musculoskeletal defects are noted and gait is age appropriate without ataxia  SKIN: no suspicious lesions or rashes  NEURO: Normal strength and tone, sensory exam grossly normal, mentation intact and speech normal    Diagnostic Test Results:  none     ASSESSMENT/PLAN:   Quang was seen today for physical, imm/inj and imm/inj.    Diagnoses and all orders for this visit:    Routine general medical examination at a health care facility    Chronic cough  -     famotidine (PEPCID) 40 MG tablet; Take 1 tablet (40 mg) by mouth At Bedtime  -     OFFICE/OUTPT VISIT,EST,LEVL IV  Suspect for silent GERD. Trial of famotidine nightly discussed with patient. He concurs.  Also possible ACE-I cough - will consider this more if gERD treatment does not relieve symptom.  Advised lifestyle changes to reduce acid reflux.  Return precautions discussed and given to patient.    Benign essential hypertension  -     lisinopril (ZESTRIL) 5 MG tablet; Take 1 tablet (5 mg) by mouth daily Hold until patient calls for refill.  -     metoprolol tartrate (LOPRESSOR) 50 MG tablet; Take 1 tablet (50 mg) by mouth 2 times daily Hold until patient calls for refill.  -     triamterene-HCTZ (MAXZIDE-25) 37.5-25 MG tablet; Take 1 tablet by mouth daily Hold until patient calls for refill.  -     Comprehensive metabolic panel; Future  -     OFFICE/OUTPT VISIT,EST,LEVL IV  -     Comprehensive metabolic panel  Controlled.  Low salt, low fat diet.   Exercise as tolerated.  Take meds as prescribed; call if with side effects.     Mixed hyperlipidemia  -     rosuvastatin (CRESTOR) 20 MG tablet; Take 1 tablet (20 mg) by mouth daily Hold until patient calls for refill.  -     Lipid panel reflex to direct LDL Fasting; Future  -     Comprehensive metabolic panel; Future  -     OFFICE/OUTPT VISIT,EST,LEVL IV  -      "Lipid panel reflex to direct LDL Fasting  -     Comprehensive metabolic panel  Reinforced heart healthy lifestyle.   Continue statin.    High priority for 2019-nCoV vaccine  -     COVID-19,PF,MODERNA (18+ Yrs BOOSTER .25mL)    Non morbid obesity, unspecified obesity type  Patient was advised healthy diet recommendations.  Patient was advised weekly exercise recommendations and goals.    Need for prophylactic vaccination and inoculation against influenza  -     INFLUENZA QUAD, RECOMBINANT, P-FREE (RIV4) (FLUBLOK)        Patient has been advised of split billing requirements and indicates understanding: Yes  COUNSELING:   Reviewed preventive health counseling, as reflected in patient instructions    Estimated body mass index is 33.51 kg/m  as calculated from the following:    Height as of this encounter: 1.765 m (5' 9.5\").    Weight as of this encounter: 104.4 kg (230 lb 3.2 oz).     Weight management plan: Discussed healthy diet and exercise guidelines    He reports that he has never smoked. He has never used smokeless tobacco.      Counseling Resources:  ATP IV Guidelines  Pooled Cohorts Equation Calculator  FRAX Risk Assessment  ICSI Preventive Guidelines  Dietary Guidelines for Americans, 2010  USDA's MyPlate  ASA Prophylaxis  Lung CA Screening    Rafael Velasco MD  [facility]  "

## 2022-04-11 ENCOUNTER — HOSPITAL ENCOUNTER (EMERGENCY)
Facility: CLINIC | Age: 67
Discharge: HOME | End: 2022-04-11
Attending: EMERGENCY MEDICINE | Admitting: EMERGENCY MEDICINE
Payer: COMMERCIAL

## 2022-04-11 VITALS
BODY MASS INDEX: 32.21 KG/M2 | OXYGEN SATURATION: 98 % | SYSTOLIC BLOOD PRESSURE: 138 MMHG | DIASTOLIC BLOOD PRESSURE: 78 MMHG | RESPIRATION RATE: 18 BRPM | WEIGHT: 225 LBS | TEMPERATURE: 97.8 F | HEIGHT: 70 IN | HEART RATE: 78 BPM

## 2022-04-11 DIAGNOSIS — M54.2 NECK PAIN: ICD-10-CM

## 2022-04-11 DIAGNOSIS — R41.0 CONFUSION: ICD-10-CM

## 2022-04-11 DIAGNOSIS — G24.3 SPASMODIC TORTICOLLIS: ICD-10-CM

## 2022-04-11 PROCEDURE — 99284 EMERGENCY DEPT VISIT MOD MDM: CPT | Performed by: EMERGENCY MEDICINE

## 2022-04-11 PROCEDURE — 250N000013 HC RX MED GY IP 250 OP 250 PS 637: Performed by: EMERGENCY MEDICINE

## 2022-04-11 RX ORDER — OXYCODONE HYDROCHLORIDE 5 MG/1
TABLET ORAL
Qty: 8 TABLET | Refills: 0 | Status: SHIPPED | OUTPATIENT
Start: 2022-04-11 | End: 2022-06-01

## 2022-04-11 RX ORDER — CYCLOBENZAPRINE HCL 10 MG
5-10 TABLET ORAL 3 TIMES DAILY PRN
Qty: 20 TABLET | Refills: 0 | Status: SHIPPED | OUTPATIENT
Start: 2022-04-11 | End: 2022-06-01

## 2022-04-11 RX ORDER — OXYCODONE HYDROCHLORIDE 5 MG/1
5 TABLET ORAL ONCE
Status: COMPLETED | OUTPATIENT
Start: 2022-04-11 | End: 2022-04-11

## 2022-04-11 RX ORDER — METHYLPREDNISOLONE 4 MG
TABLET, DOSE PACK ORAL
Qty: 21 TABLET | Refills: 0 | Status: SHIPPED | OUTPATIENT
Start: 2022-04-11 | End: 2022-06-01

## 2022-04-11 RX ORDER — ACETAMINOPHEN 500 MG
1000 TABLET ORAL ONCE
Status: COMPLETED | OUTPATIENT
Start: 2022-04-11 | End: 2022-04-11

## 2022-04-11 RX ADMIN — ACETAMINOPHEN 1000 MG: 500 TABLET ORAL at 16:15

## 2022-04-11 RX ADMIN — OXYCODONE HYDROCHLORIDE 5 MG: 5 TABLET ORAL at 16:16

## 2022-04-11 ASSESSMENT — ENCOUNTER SYMPTOMS
NECK PAIN: 1
ABDOMINAL PAIN: 0
FEVER: 0
SHORTNESS OF BREATH: 0

## 2022-04-11 NOTE — DISCHARGE INSTRUCTIONS
Tylenol, and ibuprofen can be alternated as needed for pain.  You can take up to 4000 mg acetaminophen in a day, and up to 2400 mg of ibuprofen.    Oxycodone for severe pains.  Flexeril for muscle spasms.    The 2 above medications can make you sleepy, or groggy.  Best to separate those medications by a couple hours.    Steroids as prescribed.

## 2022-04-11 NOTE — ED NOTES
Pt c/o right sided neck pain for past 4 days that started when he awoke.  Pain is sharp in nature.  Pain worse when moving head to the left.  No known injury.  No numbness/tingling.

## 2022-04-11 NOTE — ED PROVIDER NOTES
History     Chief Complaint   Patient presents with     Neck Pain     HPI  Quang Gillespie is a 66 year old male who with past medical history significant for hypertension, hyperlipidemia, presenting the emergency department with concerns regarding neck pain.  Patient reports approximately 4 to 5-day history of increased amounts of neck pain, located primarily on the right lateral aspect of the neck, which does radiate towards the right shoulder in addition to the right mastoid process area.  Pain has been intermittent, worsened with turning his head towards the left.  No worsening of pain with turning his head towards the right, or with touching his chin to his chest, however does have intermittent worsening of pain.  No trauma, fall, or other injury.  Denies any fever, or chills.  No history of neck surgeries.  No weakness of the arms.  No numbness, tingling of the arms.  No prior surgical history of the neck.  No visual changes, hearing changes, trouble swallowing, sore throat, or other facial related changes.  Does have remote history of confusion according to wife.  They have been recommended to follow-up with MRI as an outpatient.  This has not yet been performed.    Allergies:  No Known Allergies    Problem List:    Patient Active Problem List    Diagnosis Date Noted     Non morbid obesity, unspecified obesity type 06/13/2016     Priority: Medium     Impacted cerumen of both ears 06/06/2016     Priority: Medium     Advanced directives, counseling/discussion 03/18/2015     Priority: Medium     Coronary artery disease involving native coronary artery of native heart without angina pectoris 01/07/2011     Priority: Medium     Benign essential hypertension 12/22/2010     Priority: Medium     Mixed hyperlipidemia      Priority: Medium     Diagnosis updated by automated process. Provider to review and confirm.          Past Medical History:    Past Medical History:   Diagnosis Date     Heart disease       "Hyperlipidemia LDL goal < 130      Hypertension        Past Surgical History:    Past Surgical History:   Procedure Laterality Date     CARDIAC SURGERY      1 stentplaced     COLONOSCOPY N/A 2/23/2018    Procedure: COLONOSCOPY;  colonoscopy;  Surgeon: Daniel Caldera MD;  Location: [facility]       Family History:    Family History   Problem Relation Age of Onset     Cancer Mother         brain tumor     C.A.D. Mother      Alcohol/Drug Mother         smoker     Cancer Father         hodgkins     Alcohol/Drug Father         smoker     Other Cancer Father        Social History:  Marital Status:   [2]  Social History     Tobacco Use     Smoking status: Never Smoker     Smokeless tobacco: Never Used   Vaping Use     Vaping Use: Never used   Substance Use Topics     Alcohol use: Yes     Comment: Very seldom     Drug use: No        Medications:    aspirin 81 MG tablet  cyclobenzaprine (FLEXERIL) 10 MG tablet  famotidine (PEPCID) 40 MG tablet  lisinopril (ZESTRIL) 5 MG tablet  methylPREDNISolone (MEDROL DOSEPAK) 4 MG tablet therapy pack  metoprolol tartrate (LOPRESSOR) 50 MG tablet  oxyCODONE (ROXICODONE) 5 MG tablet  rosuvastatin (CRESTOR) 20 MG tablet  triamterene-HCTZ (MAXZIDE-25) 37.5-25 MG tablet          Review of Systems   Constitutional: Negative for fever.   Respiratory: Negative for shortness of breath.    Cardiovascular: Negative for chest pain.   Gastrointestinal: Negative for abdominal pain.   Musculoskeletal: Positive for neck pain.   Neurological:        See HPI   All other systems reviewed and are negative.      Physical Exam   BP: (!) 148/85  Pulse: 82  Temp: 97.8  F (36.6  C)  Resp: 18  Height: 177.8 cm (5' 10\")  Weight: 102.1 kg (225 lb)  SpO2: 95 %      Physical Exam  /78   Pulse 78   Temp 97.8  F (36.6  C) (Temporal)   Resp 18   Ht 1.778 m (5' 10\")   Wt 102.1 kg (225 lb)   SpO2 98%   BMI 32.28 kg/m    General: alert and in no acute distress  Head: atraumatic, normocephalic  Neck: No " specific midline tenderness to palpation.  Some pain with range of motion towards the left.  Abd: nondistended  Musculoskel/Extremities: normal extremities, no apparent edema, and full AROM of major joints.  Normal bilateral upper extremity strength and sensation.  Skin: no rashes, no diaphoresis and skin color normal  Neuro: Patient awake, alert, oriented, speech is fluent, gait is normal  Psychiatric: affect/mood normal, cooperative, normal judgement/insight and memory intact      ED Course                 Procedures              Critical Care time:  none               No results found for this or any previous visit (from the past 24 hour(s)).    Medications   acetaminophen (TYLENOL) tablet 1,000 mg (1,000 mg Oral Given 4/11/22 1615)   oxyCODONE (ROXICODONE) tablet 5 mg (5 mg Oral Given 4/11/22 1616)       Assessments & Plan (with Medical Decision Making)  66 year old male, presenting the emergency department with concerns regarding neck pain, with neck strain.  Pain is worsened with movement and turning the head towards the left.  Some pain with movement towards the right.  No traumatic injury.  No fever that would suggest infectious etiology.  No radiation towards the upper extremities that would be concerning for other radicular pain.  I feel this represents more torticollis, and spasm.  Wife does report that she saw visible spasms of the right side of the neck.  Patient will be prescribed muscle relaxer.  Also given the concerns regarding sharp, stabbing pain, concern is for possible radicular type symptoms as well.  Outpatient MRI will be performed.  No indication for emergent MRI as patient has no other focal neurologic deficits.  Wife and patient requested brain MRI to also be performed as they have been requested to have this done as an outpatient as well.  Will order that test to be done as well, and patient can follow-up with Dr. Velasco, primary care provider for further outpatient management.  I feel  this is likely torticollis, however cannot rule out other nerve impingement/radicular type pain of the cervical spine for which outpatient MRI has been performed.     I have reviewed the nursing notes.    I have reviewed the findings, diagnosis, plan and need for follow up with the patient.       Discharge Medication List as of 4/11/2022  4:18 PM      START taking these medications    Details   cyclobenzaprine (FLEXERIL) 10 MG tablet Take 0.5-1 tablets (5-10 mg) by mouth 3 times daily as needed for muscle spasms, Disp-20 tablet, R-0, E-Prescribe      methylPREDNISolone (MEDROL DOSEPAK) 4 MG tablet therapy pack Follow package directions and take tablets up to 4 times daily, Disp-21 tablet, R-0, E-Prescribe      oxyCODONE (ROXICODONE) 5 MG tablet Take 1 tablet (5 mg) by mouth every 6 hours as needed for severe pain, Disp-8 tablet, R-0, E-Prescribe             Final diagnoses:   Neck pain   Spasmodic torticollis   Confusion - prior episodes.  not today       4/11/2022   [facility] EMERGENCY DEPT     Lon, Daniel Maxwell MD  04/11/22 7471

## 2022-04-14 ENCOUNTER — HOSPITAL ENCOUNTER (OUTPATIENT)
Dept: MRI IMAGING | Facility: CLINIC | Age: 67
Discharge: HOME | End: 2022-04-14
Attending: EMERGENCY MEDICINE
Payer: COMMERCIAL

## 2022-04-14 DIAGNOSIS — G24.3 SPASMODIC TORTICOLLIS: ICD-10-CM

## 2022-04-14 DIAGNOSIS — R41.0 CONFUSION: ICD-10-CM

## 2022-04-14 DIAGNOSIS — M54.2 NECK PAIN: ICD-10-CM

## 2022-04-14 PROCEDURE — 255N000002 HC RX 255 OP 636: Performed by: EMERGENCY MEDICINE

## 2022-04-14 PROCEDURE — 70553 MRI BRAIN STEM W/O & W/DYE: CPT

## 2022-04-14 PROCEDURE — 72141 MRI NECK SPINE W/O DYE: CPT

## 2022-04-14 PROCEDURE — A9585 GADOBUTROL INJECTION: HCPCS | Performed by: EMERGENCY MEDICINE

## 2022-04-14 RX ORDER — GADOBUTROL 604.72 MG/ML
10 INJECTION INTRAVENOUS ONCE
Status: COMPLETED | OUTPATIENT
Start: 2022-04-14 | End: 2022-04-14

## 2022-04-14 RX ADMIN — GADOBUTROL 10 ML: 604.72 INJECTION INTRAVENOUS at 08:20

## 2022-04-27 ENCOUNTER — MYC MEDICAL ADVICE (OUTPATIENT)
Dept: FAMILY MEDICINE | Facility: CLINIC | Age: 67
End: 2022-04-27
Payer: COMMERCIAL

## 2022-06-01 ENCOUNTER — OFFICE VISIT (OUTPATIENT)
Dept: FAMILY MEDICINE | Facility: CLINIC | Age: 67
End: 2022-06-01
Payer: COMMERCIAL

## 2022-06-01 VITALS
TEMPERATURE: 98.4 F | WEIGHT: 225.6 LBS | HEART RATE: 77 BPM | HEIGHT: 70 IN | OXYGEN SATURATION: 94 % | BODY MASS INDEX: 32.3 KG/M2 | DIASTOLIC BLOOD PRESSURE: 78 MMHG | RESPIRATION RATE: 20 BRPM | SYSTOLIC BLOOD PRESSURE: 118 MMHG

## 2022-06-01 DIAGNOSIS — M47.812 FACET ARTHROPATHY, CERVICAL: Primary | ICD-10-CM

## 2022-06-01 DIAGNOSIS — R41.89 COGNITIVE CHANGES: ICD-10-CM

## 2022-06-01 PROCEDURE — 99214 OFFICE O/P EST MOD 30 MIN: CPT | Performed by: FAMILY MEDICINE

## 2022-06-01 ASSESSMENT — PAIN SCALES - GENERAL: PAINLEVEL: NO PAIN (0)

## 2022-06-01 NOTE — PATIENT INSTRUCTIONS
Referrals to neuropsychology and spine clinic have been signed. Schedulers will call you in the next 3-5 business days.     Read the literature given to you today about cognitive function and neck pain.

## 2022-06-01 NOTE — PROGRESS NOTES
Assessment & Plan     Facet arthropathy, cervical  Patient an spouse were advised of his MRI findings.  Patient states pain is not present at this time.  Discussed possible recurrence of arthritic pain.  Discussed referral to cervical spine clinic. Patient concurred.  Advised activity as tolerated and prevention of pain recurrence.  Return precautions discussed and given to patient.  - Spine Referral    Cognitive changes  Advised patient and spouse of possible causes. Suspect various forms of dementia or MCI.  neuropsych eval recommended. They concurred.  Refer to neurology if verified dementia.  Discussed mental activity and stimulation to help preserve brain function and possibly delay progression.  Return precautions discussed and given to patient.   - Adult Neuropsychology Referral  Patient Instructions   Referrals to neuropsychology and spine clinic have been signed. Schedulers will call you in the next 3-5 business days.     Read the literature given to you today about cognitive function and neck pain.      Return in about 1 month (around 7/1/2022) for In-clinic visit for results of neuropsychology evaluation.    Rafael Velasco MD  [facility]    Meche Bauer is a 66 year old who presents for the following health issues  accompanied by his spouse.    HPI   Chief Complaint   Patient presents with     ER F/U     Discuss MRI results and Cognitive issues      Health Maintenance     Declined covid booster , will check with insurance about Shingrix        ED/UC Followup:    Facility:  [facility]   Date of visit: 4/11/2022  Reason for visit: neck pain   Current Status: Pain is better, Patient would like to discuss results of the MRI and Discuss memory /Confusion Issues.      Brain MRi 4/14/2022  IMPRESSION:  1. No acute intracranial process.  2. Brain atrophy and presumed chronic small vessel ischemic changes,  as described. No significant change compared to the prior  "study.       Cervical MRI 4/14/2022  IMPRESSION:    1. Severe facet arthropathy on the right at C3-C4 with marrow edema  suggestive of active arthropathy.  2. Degenerative change with multiple spinal canal and foraminal  stenoses as detailed.     Patient provided very limited information on his cognitive symptoms except for \"I keep tying to look for things\".  Spouse cites that patient has had difficulty with tasks he used to do well, has troublee with measurements with house projects, has been found trying to look for forms and items repeatedly while on vacation, leaving tasks unfinished, would open fridge then leave without picking up anything and leaving the door open.  Spouse said patient's other family members and friends have noticecd change in his ability to function but was not very specific.  Spouse states she sees \"blankness in his eyes\".  Patient has not been reported to wander or leave car or stove unattended.  No behavior outbursts.    Review of Systems   Constitutional, HEENT, cardiovascular, pulmonary, GI, , musculoskeletal, neuro, skin, endocrine and psych systems are negative, except as otherwise noted.      Objective    /78 (BP Location: Left arm, Patient Position: Chair, Cuff Size: Adult Large)   Pulse 77   Temp 98.4  F (36.9  C) (Tympanic)   Resp 20   Ht 1.778 m (5' 10\")   Wt 102.3 kg (225 lb 9.6 oz)   SpO2 94%   BMI 32.37 kg/m    Body mass index is 32.37 kg/m .  Physical Exam   GEN: alert, oriented x 3, NAD, ambulatory w/o assist  NECK: full range of motion with no pain  MENTATION: pleasant, volunteers limited info on his answers, often looks at spouse when asked questions    No results found for any visits on 06/01/22.        "

## 2022-07-27 DIAGNOSIS — I10 BENIGN ESSENTIAL HYPERTENSION: ICD-10-CM

## 2022-07-27 RX ORDER — LISINOPRIL 5 MG/1
5 TABLET ORAL DAILY
Qty: 90 TABLET | Refills: 0 | Status: SHIPPED | OUTPATIENT
Start: 2022-07-27 | End: 2022-10-19

## 2022-07-27 RX ORDER — METOPROLOL TARTRATE 50 MG
50 TABLET ORAL 2 TIMES DAILY
Qty: 180 TABLET | Refills: 0 | Status: SHIPPED | OUTPATIENT
Start: 2022-07-27 | End: 2022-10-19

## 2022-07-29 ENCOUNTER — OFFICE VISIT (OUTPATIENT)
Dept: PALLIATIVE MEDICINE | Facility: CLINIC | Age: 67
End: 2022-07-29
Attending: FAMILY MEDICINE
Payer: COMMERCIAL

## 2022-07-29 VITALS — DIASTOLIC BLOOD PRESSURE: 77 MMHG | HEART RATE: 57 BPM | SYSTOLIC BLOOD PRESSURE: 156 MMHG

## 2022-07-29 DIAGNOSIS — M48.02 SPINAL STENOSIS IN CERVICAL REGION: Primary | ICD-10-CM

## 2022-07-29 DIAGNOSIS — M47.812 FACET ARTHROPATHY, CERVICAL: ICD-10-CM

## 2022-07-29 PROCEDURE — 99204 OFFICE O/P NEW MOD 45 MIN: CPT | Performed by: STUDENT IN AN ORGANIZED HEALTH CARE EDUCATION/TRAINING PROGRAM

## 2022-07-29 RX ORDER — CYCLOBENZAPRINE HCL 10 MG
5-10 TABLET ORAL
Qty: 20 TABLET | Refills: 0 | Status: SHIPPED | OUTPATIENT
Start: 2022-07-29 | End: 2022-12-14

## 2022-07-29 RX ORDER — GABAPENTIN 300 MG/1
300 CAPSULE ORAL
Qty: 90 CAPSULE | Refills: 0 | Status: SHIPPED | OUTPATIENT
Start: 2022-07-29 | End: 2022-10-08

## 2022-07-29 ASSESSMENT — PAIN SCALES - GENERAL: PAINLEVEL: MILD PAIN (2)

## 2022-07-29 NOTE — Clinical Note
Reagan Velasco  Juancarlos's pain is likely related to arthritis plus intermittent radicular pain. His wife asked for PRN gabapentin, which I think is fine and prescribed it. It is of low-enough dose that PRN use is OK. However, I am leaving the clinic and therefore recommended he follow up with you in the future.  Yin Triana MD [facility]

## 2022-07-29 NOTE — PROGRESS NOTES
Quang Gillespie  :  1955  DOS: 2022  MRN: [id number]    Medical Spine Medicine Clinic Visit    PCP: Rafael Velasco    Quang Gillespie is a 66 year old LHD male with a history of CAD, obesity referred by Rafael Velasco for: Facet arthropathy, cerv    Pain for 2 yrs. Manageable until 2022. No clear etiology except he was painting overhead at the time. He went to the ED at that time. He was working at the time and a hot shower would loosen his shoulder in the morning and it would be fine all day. Intermittent.     Pain ins along the Left neck, over left anterior shoulder. Sleeping without a pillow is helpful. Cannot identify any exacerbating activities or positions. Pain is not burning but a sharp pain. No numbness or tingling. No weakness. Has had a tremor in his left hand for years - has not noticed effect of EtOH on this.     Other evaluation and/or treatments so far consists of: Heat, Ibuprofen and biofreeze.      Current pain medications:   - ibuprofen 600 mg   - oxycodone x1 - felt terrible. Would never use again  - muscle relaxer helped     Previous medications:   - PO prednisone - helped    Other pertinent medications:  - n    Anticoagulants:  - ASA 81 mf    Injections:   - n    History of Surgery in the painful area:   - n    Social History: . Here with wife. Retired from construction 2022    Past Medical History:   Diagnosis Date     Heart disease     stent      Hyperlipidemia LDL goal < 130      Hypertension      Past Surgical History:   Procedure Laterality Date     CARDIAC SURGERY      1 stentplaced     COLONOSCOPY N/A 2018    Procedure: COLONOSCOPY;  colonoscopy;  Surgeon: Daniel Caldera MD;  Location: [facility]     Family History   Problem Relation Age of Onset     Cancer Mother         brain tumor     C.A.D. Mother      Alcohol/Drug Mother         smoker     Cancer Father         hodgkins     Alcohol/Drug Father         smoker     Other Cancer  Father        Objective  BP (!) 156/77   Pulse 57       General: healthy, alert and in no distress      HEENT: no scleral icterus or conjunctival erythema     Skin: no suspicious lesions or rash. No jaundice.     CV: regular rhythm by palpation, 2+ distal pulses, no pedal edema      Resp: normal respiratory effort without conversational dyspnea     Psych: normal mood and affect      Gait: nonantalgic, appropriate coordination and balance     Neuro: Motor strength as noted below     Neck exam:    Inspection:       no visible deformity in the low back       normal skin       normal vascular       normal lymphatic    Cervical spine:  Range of motion within normal limits   Myofascial tenderness:   No focal spinous process tenderness.   Spurling's negative bilaterally.     Shoulder exam:   No shoulder girdle wasting or scapular dyskinesis. No palmar muscle wasting. No tenderness of bicipital groove, AC, GH, or SC joints. Shoulder range of motion within normal limits. Underwood', Neers, and empty-can are negative.     Carpal Tunnel tests:   Tinel's negative    Ulnar neuropathy tests:   Tinel's at the ulnar groove negative    Neurologic exam:  CN:  Cranial nerves 2-12 are grossly intact  Motor Strength:  5/5 symmetric UE  strength  Reflexes:    Biceps, brachioradialis, triceps intact and symmetric    Other reflexes:    Woods's negative bilaterally      Radiology:  MRI C spine 4/14/2022  C4-C5: Moderate disc height loss. Disc osteophyte complex. Moderate  bilateral facet arthropathy. Moderate to severe right foraminal  stenosis. Severe left foraminal stenosis. Mild spinal canal stenosis.   C5-C6: Severe disc height loss. Disc osteophyte complex with left  central protrusion/osteophyte component. Mild-to-moderate bilateral  facet arthropathy. Moderate to severe right foraminal stenosis. Severe  left foraminal stenosis. Moderate spinal canal stenosis with  flattening of the anterior cord.   C6-C7: Moderate disc height loss.  Disc osteophyte complex with broad  central component. Mild bilateral facet arthropathy. Severe bilateral  foraminal stenosis. Moderate spinal canal stenosis with flattening of  the anterior cord.    Assessment:  1. Spinal stenosis in cervical region    2. Facet arthropathy, cervical        Plan:  Discussed the assessment with the patient.  - Gabapentin 300 mg bedtime as needed   Over the counter medication: Acetaminophen (Tylenol) 1000mg every 6 hours with food (Maximum of 3000mg/day)  Ibuprofen (Advil) maximum of 800mg three times a day with food  Wife wants me to note appears to have some mental slowing  Follow up: with Dr. Velasco as able    BILLING TIME DOCUMENTATION:   The total TIME spent on this patient on the date of the encounter/appointment was 40 minutes.      TOTAL TIME includes:   Time spent preparing to see the patient (reviewing records and tests) - 4 min  Time spent face to face (or over the phone) with the patient - 29 min  Time spent ordering tests, medications, procedures and referrals - - min  Time spent Referring and communicating with other healthcare professionals - 1 min  Time spent documenting clinical information in Epic - 5 min     Yin Triana MD  [facility]     Disclaimer: This note consists of symbols derived from keyboarding, dictation and/or voice recognition software. As a result, there may be errors in the script that have gone undetected. Please consider this when interpreting information found in this chart.

## 2022-07-29 NOTE — PATIENT INSTRUCTIONS
I think your left sided neck and shoulder pain is most likely due to arthritis and irritated nerves in your neck.     For this we will:     - Start gabapentin 300 mg bedtime as needed  - Continue Flexeril as needed.   - OK to contact Dr. Velasco in the future to request another Medrol Dosepak of prednisone for another severe flare  - Take Tylenol 1000mg up to three times a day with ibuprofen 800mg up to three times a day as needed. Tylenol and ibuprofen tend to work best when used together.        For gabapentin:   Caution for sedation.    Do not drive until you know how the medication affects you.   Watch for weight gain and swelling of the legs    Flexeril + gabapentin will be especially sedating. Use caution when using together.     Your next steps:  Fill your gabapentin prescription     Follow up:   - With Dr. Velasco as needed    Yin Triana MD  [facility]      ----------------------------------------------------------------  Clinic Number:  [phone]   Call with any questions about your care and for scheduling assistance.   Calls are returned Monday through Friday between 8 AM and 4:30 PM. We usually get back to you within 2 business days depending on the issue/request.    If we are prescribing your medications:  For opioid medication refills, call the clinic or send a [employer] message 7 days in advance.  Please include:  Name of requested medication  Name of the pharmacy.  For non-opioid medications, call your pharmacy directly to request a refill. Please allow 3-4 days to be processed.   Per [place] State Law:  All controlled substance prescriptions must be filled within 30 days of being written.    For those controlled substances allowing refills, pickup must occur within 30 days of last fill.      We believe regular attendance is key to your success in our program!    Any time you are unable to keep your appointment we ask that you call us at least 24 hours in advance to cancel.This  will allow us to offer the appointment time to another patient.   Multiple missed appointments may lead to dismissal from the clinic.

## 2022-10-08 ENCOUNTER — MYC REFILL (OUTPATIENT)
Dept: PALLIATIVE MEDICINE | Facility: CLINIC | Age: 67
End: 2022-10-08

## 2022-10-08 DIAGNOSIS — M47.812 FACET ARTHROPATHY, CERVICAL: ICD-10-CM

## 2022-10-08 DIAGNOSIS — M48.02 SPINAL STENOSIS IN CERVICAL REGION: ICD-10-CM

## 2022-10-10 NOTE — TELEPHONE ENCOUNTER
Refills have been requested for the following medications:         gabapentin (NEURONTIN) 300 MG capsule [Yin Triana]     Preferred pharmacy: [facility] [place], [place] - [address]

## 2022-10-11 RX ORDER — GABAPENTIN 300 MG/1
300 CAPSULE ORAL
Qty: 90 CAPSULE | Refills: 0 | Status: SHIPPED | OUTPATIENT
Start: 2022-10-11 | End: 2022-12-14

## 2022-10-11 NOTE — TELEPHONE ENCOUNTER
Received call from patient requesting refill(s) of gabapentin (NEURONTIN) 300 MG capsule     Last dispensed from pharmacy on 07/29/2022    Patient's last office/virtual visit by prescribing provider on 07/29/2022- DR. Christian   Next office/virtual appointment scheduled for None    E-prescribe to [facility] - [place], [place] - [address]  pharmacy    Will route to nursing [place] for review and preparation of prescription(s).       Chika Lemons MA  [facility]

## 2022-10-18 DIAGNOSIS — I10 BENIGN ESSENTIAL HYPERTENSION: ICD-10-CM

## 2022-10-19 RX ORDER — METOPROLOL TARTRATE 50 MG
50 TABLET ORAL 2 TIMES DAILY
Qty: 180 TABLET | Refills: 0 | Status: SHIPPED | OUTPATIENT
Start: 2022-10-19 | End: 2022-12-14

## 2022-10-19 RX ORDER — LISINOPRIL 5 MG/1
5 TABLET ORAL DAILY
Qty: 90 TABLET | Refills: 0 | Status: SHIPPED | OUTPATIENT
Start: 2022-10-19 | End: 2022-12-14

## 2022-10-19 NOTE — TELEPHONE ENCOUNTER
"Requested Prescriptions   Pending Prescriptions Disp Refills    metoprolol tartrate (LOPRESSOR) 50 MG tablet [Pharmacy Med Name: METOPROLOL TARTRATE 50MG TABS] 180 tablet 0     Sig: TAKE 1 TABLET (50 MG) BY MOUTH 2 TIMES DAILY       Beta-Blockers Protocol Failed - 10/18/2022  8:02 AM        Failed - Blood pressure under 140/90 in past 12 months     BP Readings from Last 3 Encounters:   07/29/22 (!) 156/77   06/01/22 118/78   04/11/22 138/78                 Passed - Patient is age 6 or older        Passed - Recent (12 mo) or future (30 days) visit within the authorizing provider's specialty     Patient has had an office visit with the authorizing provider or a provider within the authorizing providers department within the previous 12 mos or has a future within next 30 days. See \"Patient Info\" tab in inbasket, or \"Choose Columns\" in Meds & Orders section of the refill encounter.              Passed - Medication is active on med list          lisinopril (ZESTRIL) 5 MG tablet [Pharmacy Med Name: LISINOPRIL 5MG TABS] 90 tablet 0     Sig: TAKE 1 TABLET (5 MG) BY MOUTH DAILY       ACE Inhibitors (Including Combos) Protocol Failed - 10/18/2022  8:02 AM        Failed - Blood pressure under 140/90 in past 12 months     BP Readings from Last 3 Encounters:   07/29/22 (!) 156/77   06/01/22 118/78   04/11/22 138/78                 Passed - Recent (12 mo) or future (30 days) visit within the authorizing provider's specialty     Patient has had an office visit with the authorizing provider or a provider within the authorizing providers department within the previous 12 mos or has a future within next 30 days. See \"Patient Info\" tab in inbasket, or \"Choose Columns\" in Meds & Orders section of the refill encounter.              Passed - Medication is active on med list        Passed - Patient is age 18 or older        Passed - Normal serum creatinine on file in past 12 months     Recent Labs   Lab Test 01/10/22  0938   CR 0.96 "       Ok to refill medication if creatinine is low          Passed - Normal serum potassium on file in past 12 months     Recent Labs   Lab Test 01/10/22  0938   POTASSIUM 4.5

## 2022-10-19 NOTE — TELEPHONE ENCOUNTER
Please call patient to schedule RN BP recheck within the next 1-2 weeks. His last recorded BP was high.

## 2022-10-24 NOTE — TELEPHONE ENCOUNTER
LM on patient VM rx refilled, please call Care Team for message from Dr. Velasco.Azalea Benitez on 10/24/2022 at 10:10 AM

## 2022-11-22 ENCOUNTER — OFFICE VISIT (OUTPATIENT)
Dept: NEUROPSYCHOLOGY | Facility: CLINIC | Age: 67
End: 2022-11-22
Attending: FAMILY MEDICINE
Payer: COMMERCIAL

## 2022-11-22 DIAGNOSIS — F03.90 MAJOR NEUROCOGNITIVE DISORDER (H): Primary | ICD-10-CM

## 2022-11-22 PROCEDURE — 90791 PSYCH DIAGNOSTIC EVALUATION: CPT

## 2022-11-22 PROCEDURE — 96132 NRPSYC TST EVAL PHYS/QHP 1ST: CPT

## 2022-11-22 PROCEDURE — 96139 PSYCL/NRPSYC TST TECH EA: CPT

## 2022-11-22 PROCEDURE — 96133 NRPSYC TST EVAL PHYS/QHP EA: CPT

## 2022-11-22 PROCEDURE — 96138 PSYCL/NRPSYC TECH 1ST: CPT

## 2022-11-22 NOTE — LETTER
2022       RE: Quang Gillespie  [id number] [facility] [place] [address]     Dear Colleague,    Thank you for referring your patient, Quang Gillespie, to the [facility] NEUROPSYCHOLOGY [place] [facility]. Please see a copy of my visit note below.    NEUROPSYCHOLOGICAL EVALUATION  **CONFIDENTIAL**      Name: Quang Gillespie Education: 12 years    (age): 1955 (67 years) MARTÍNEZ:  2022   Referral: Rafael Velasco MD  Department of Family Medicine Handedness:  MRN (Epic): Left  [id number]     IDENTIFYING INFORMATION AND REASON FOR REFERRAL   Mr. Gillespie is a 67-year-old, left-handed White male with a history of coronary artery disease, hypertension, and hyperlipidemia. This evaluation was requested to provide a comprehensive assessment of his current neuropsychological status to inform treatment planning.     IMPRESSION  See below for relevant background information and detailed test results. See separate abstract for supporting documentation including a list of neuropsychological measures and test scores.    In the context of estimated below average premorbid intellectual abilities, Mr. Gillespie s neuropsychological profile revealed impaired performance in the following domains:  1. Memory: Encoding and retrieval of verbal and visual information were severely impaired. Recognition of information was variable with below average and exceptionally low scores.   2. Executive functioning: Performance was severely impaired on tests of mental flexibility with psychomotor and visual scanning demands, as he was unable to complete the sample trial despite multiple attempts. Performance was severely impaired on a test of visuoconstruction requiring planning and organization. Performance was severely impaired on a test of letter-cue verbal fluency, a language task with executive demands. Moderately impaired verbal abstract reasoning performance may be  consistent with premorbid estimates. Performance was variable on tests of working memory with low average and severely impaired scores.   3. Processing speed: Performances on psychomotor processing speed tasks were moderately to severely impaired.   4. Visuospatial: Copy of simple and complex figures were severely impaired and notable for a dysexecutive, piecemeal approach, missing elements, incorrect placement and proportions, and overall distortion. Spatial perception performance was below average.   Additionally, he was not fully orientated to place or time (15 days off on the date). Performance was below expectation on a measure of conceptual understanding of issues pertaining to health and safety. His score was consistent with individuals functioning at a low level of independence in the community.     Performance was within expectation across other cognitive tests including immediate auditory attention, naming, semantic verbal fluency, and nonverbal abstract reasoning. Assessment of current psychological and emotional status revealed the absence of clinically significant depressive mood symptoms or excessive anxiety.     Overall, Mr. Gillespie s neuropsychological profile revealed multidomain cognitive impairment. Observed test performances and reported levels of day-to-day functioning are consistent with a diagnosis of Major Neurocognitive Disorder (i.e. dementia), the underlying basis of which remains to be specified. Although unclear developmental history notable for prematurity and longstanding difficulties with reading and communication may be contributory to suboptimal scores on cognitive testing, these findings raise concern for a neurodegenerative process such as Alzheimer s disease pathology. Cerebrovascular risk factors suggest vascular etiology may also be contributory.     DIAGNOSTIC IMPRESSIONS (ICD-10)  ? Major neurocognitive disorder (F03.90)    RECOMMENDATIONS  1. Results from the present  evaluation revealed significant memory impairment and executive dysfunction. It is recommended that trusted family provide periodic oversight and monitoring for complex activities of daily living to ensure his safety and wellbeing are maintained.    2. Family members and providers may note that when communicating with Mr. Gillespie, he may benefit from novel information being broken down in small chunks and repeated across time to facilitate recall.  Additionally, we encourage Mr. Gillespie to be accompanied to all medical appointments to ensure the accurate exchange and recollection of information.  3. Results from this evaluation reveal serious memory deficits in addition to trouble with visuospatial processing, executive functioning, and processing speed. These deficits place Mr. Gillespie at increased risk for driving-related incidents and we recommend that he delegate driving responsibilities to others at this time. If Mr. Gillespie continues driving, we recommend he undergo a formal safe  evaluation to ensure his safety and the safety others when operating a motor vehicle.   4. Mr. Gillespie is encouraged to continue to live a healthy lifestyle that promotes brain health, including getting appropriate exercise (as advised by his healthcare providers), getting regular sleep, and eating healthy.    5. We recommend that Mr. Gillespie continue to maintain a socially active lifestyle. He is likely to benefit from continuing to engage in social activities that he enjoys as these activities may offer cognitive stimulation, help to prevent feelings to depression, and provide emotional benefits that will maximize quality of life.   6. Mr. Gillespie s spouse may benefit from engagement in psychotherapy and/or participation in a local support group for caregivers to help cope with stress.   7. The following resources may be helpful to Mr. Gillespie and his family:  -Family members needing additional information regarding dementia  can contact the Alzheimer s Disease and Related Disorders Association at [phone], or visit the website online at http://www.alz.org. This organization provides educational and referral resources for various types of dementia and provides information that may be beneficial now or in the future.    -The [facility] Chapter of the Alzheimer s Association can provide further information about family support groups in the area, home health services, and respite services. Information is also available at their website: www.alz.org/[place]nd  -The Family Caregiver [place] website also has helpful information:  http://www.caregiver.org/caregiver/jsp/home.jsp.   -There are several resource guides that have been published for family members and caregivers of individuals with dementia.  -Creating Moments of Sarina by Alis Blevins provides a number of simple, helpful hints for caregivers.  -[facility] on Alzheimer s Disease provides practical information for families.  -A Dignified Life: The Best Friends Approach to Alzheimer s Care by LISA DavisS.REBEKAH. and SHAWN Rutherford, M.P.H.  -The 36-Hour Day: A Family Guide to Caring for People Who Have Alzheimer s Disease and Other Dementias (7th Edition) by Teresa Castellanos M.A. and Dom Jean M.D.  8. The current evaluation will serve as an objective cognitive baseline against which results of future evaluations may be compared.  Mr. Gillespie may be referred for a follow-up neuropsychological evaluation in 12-18 months to objectively assess neurocognitive change, particularly if changes are noted in cognitive and/or behavioral status.     Thank you for the opportunity to participate in Mr. Gillespie s care.  These findings and recommendations were reviewed with the patient and his wife in a separate feedback session on 11/28/2022 and all their questions were answered. If you have any questions regarding this evaluation, please do not hesitate to contact me.       Dena  Curt, Ph.D.,   Clinical Neuropsychologist    RELEVANT BACKGROUND INFORMATION AND SUPPORTING DOCUMENTATION  Gathered from a clinical interview with the patient and his wife, and reviews of electronic medical record.     History of Presenting Problem(s)  Mr. Gillespie presented with a history of coronary artery disease, hypertension, and hyperlipidemia. He and his wife reported longstanding difficulties with reading, spelling, and word-finding with long history of delayed speech initiation and impaired comprehension that have worsened in the past couple of years.  His wife described  blankness  and trouble with processing that represents a significant change. She described trouble completing tasks that used to be simple for him (e.g., measuring a closet, putting staples in a stable gun). She reported he seems to forget what he is doing in the middle of the task such as forgetting to close the refrigerator or flush the toilet, which are uncharacteristic. She specified these moments of  blankness  are not consistent, occur approximately once per week, more often in the evenings. 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 He reported forgetting his medications twice per week, which he has done for many years, but he denied trouble setting up his pillbox independently. His wife has always managed their finances, and although he was reportedly managing invoices for his job without difficulty, she indicated he would not be able to manage their finances at this time. His wife manages his medical appointments and stated he would be unable to manage this independently. He is reportedly independent with other activities of daily living including managing simple meal preparation, driving short distances, household chores, and basic self-care. 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 Physically, he and his wife note he is shaky at times, particularly when frustrated, but they denied other physical complaints or sensorimotor disturbances. Emotionally, he reported feeling  fine , and he denied prominent symptoms of depression, anxiety, or psychological distress. His wife indicated he has never been one to communicate about his emotions and she believes he may have been depressed in the context of prison, but this has since improved. He and his wife noted increased frustration, but they denied any other behavioral or personality changes including impulsive, compulsive, or inappropriate behavior, apathy or disinhibition.    Neurodiagnostic Findings   ? Brain MRI w/wo contrast (4/14/2022) FINDINGS: No abnormal intracranial restricted diffusion identified to suggest recent infarct. The ventricles are normal in size and configuration. Mild generalized brain parenchymal volume loss. Mild-to-moderate patchy nonspecific T2 FLAIR hyperintense signal changes in the cerebral white matter, as before, likely due to chronic small vessel ischemic disease. No intracranial hemorrhage or extra-axial fluid collection. No intracranial mass or abnormal postcontrast enhancement identified. The major arterial flow voids at the skull base appear to be grossly maintained.  Trace scattered paranasal sinus mucosal thickening. Trace fluid in the mastoid tips bilaterally. The calvarium, skull base, and midface otherwise appear unremarkable.  IMPRESSION: 1. No acute intracranial process. 2. Brain atrophy and presumed chronic small vessel ischemic changes, as described. No significant change compared to the prior study.    Medical History  ? Coronary artery disease  ? Hypertension  ? Hyperlipidemia    Health Behaviors   ? Sleep:  ~8 hours of sleep nightly; denied sleep disturbance; reported snoring but denied  gasping arousals or parasomnic behavior  ? Exercise: Working around the house and helping his brothers with construction projects; he and his wife plan to get memberships to the [employer].   ? Pain: Occasional pain due to arthritis but denied pain at the time of this evaluation     Psychiatric History  ? Mr. Gillespie reported current mood is  fine . His wife stated he may have been mildly depressed upon group home, but his mood has since improved.  ? He otherwise denied history of significant depressed mood, excessive rumination, worry, or uncontrollable anxiety, alteration of sensory perceptual processes or disordered thought.  ? Suicidality/ Self-harm: He denied any suicidal ideation, plan, or intent.  ? Psychiatric treatment: None currently or in the past    Substance Use History  ? Alcohol: 3 beers per week or less  ? Nicotine: None  ? Cannabis:  None  ? Problematic Substance Use: History of social heavy alcohol use  when younger  that may have interfered with occupational and/or social functioning; denied any substance use treatment; reduced alcohol use ~8 years ago    Current Medications (per EMR)    gabapentin (NEURONTIN) 300 MG capsule     aspirin 81 MG tablet     cyclobenzaprine (FLEXERIL) 10 MG tablet     lisinopril (ZESTRIL) 5 MG tablet     metoprolol tartrate (LOPRESSOR) 50 MG tablet     rosuvastatin (CRESTOR) 20 MG tablet     triamterene-HCTZ (MAXZIDE-25) 37.5-25 MG tablet     Developmental, Educational, & Occupational History  ? Gestational: Born premature (unknown gestational age) and reported he remained in the hospital for ~1 month after birth  ? Developmental milestones: Unknown if met at expected ages  ? Education: Reported difficulty with reading and spelling; obtained Ds in regular classes; reported trouble paying attention in school but was never diagnosed with ADHD; graduated high school on time and stated he graduated despite poor grades because he was a good athlete.  ? Occupation: Construction;  retired    Psychosocial History  ? Born and raised in [place], [place]  ? Marital:  to spouse since 1990  ? Children: 2 sons  ? Housing: Lives with wife and 1 son  ? Social support:  Good  social support network of wife, sons, brothers, and neighbors    RESULTS  See separate abstract for list of neuropsychological measures and test scores. Descriptive ranges are based on American Academy of Clinical Neuropsychology (2020) consensus guidelines, or test manuals where appropriate. All standardized scores are adjusted for age and additional adjustments for demographic factors such as education were performed where applicable.    PRE-MORBID ABILITY: Premorbid abilities were estimated within the below range based on single word reading ability. Reading skills were estimated at a 5th grade reading level.   GENERAL COGNITIVE STATUS: Orientation was within expectation for general personal information. He was 15 days off on the date but otherwise oriented to time. He stated the incorrect city where this evaluation was taking place. He was able to name the current, and two most recent [place] presidents.  Within the practical domain, performance was below expectation on a measure of conceptual understanding of issues pertaining to health and safety. His score was consistent with individuals functioning at a low level of independence in the community.  ATTENTION/EXECUTIVE FUNCTIONS: Immediate auditory attention performance was average. Working memory performances were variable with low average and exceptionally low scores. Overall attention and working memory performance was below average. Verbal abstract reasoning performance was below average. Visual reasoning through pattern identification was low average. Performance on a test of set-shifting/cognitive flexibility was discontinued due to inability to complete the sample trial despite multiple attempts. Verbal set-shifting was low average to exceptionally low. Copy of  a complex figure requiring planning and organization was exceptionally low and notable for a dysexecutive and piecemeal approach resulting in missing and misplaced elements. Copy of a simple figure was also notable for a dysexecutive approach. Psychomotor processing speed performances were below average to exceptionally low.  LANGUAGE: Confrontation naming performance was low average. Letter-cue verbal fluency performance was exceptionally low. Semantic verbal fluency performance was low average.   VISUOSPATIAL PROCESSING: Performance on a spatial perception task requiring judgement of angled lines was below average. Copy of increasingly intricate figures was below average. Copy of a complex figure was exceptionally low and notable for several missing and misplaced elements, and incorrect proportions.  LEARNING AND MEMORY: Initial encoding of a word list over multiple learning trials was exceptionally low. Delayed recall of the list was exceptionally low. Recognition of the word list was below average. Initial encoding of contextualized verbal information in the form of short stories was exceptionally low and delayed retrieval was exceptionally low. Recognition of story details was exceptionally low. Encoding and retrieval of visual information was exceptionally low. Recognition among distractors was normatively below average but notable for only 1/7 correct responses.   PSYCHOLOGICAL AND BEHAVIORAL: He endorsed minimal symptoms of depression and anxiety on self-report questionnaires.    PERFORMANCE VALIDITY: Performance on neuropsychological tests is dependent upon a number of factors, including sufficient engagement and motivation, to reliably establish an examinee s level of cognitive functioning.  Based upon observations made throughout the evaluation, the patient did not appear to deliberately perform in a suboptimal manner and demonstrated good frustration tolerance on cognitively challenging tasks. Score on  an imbedded index of performance validity was in the valid range. Overall, test results are believed to accurately represent the patient s current neurocognitive status.    BEHAVIORAL OBSERVATIONS  ? Presented 70 minutes early and was accompanied by his wife  ? Alert, oriented to self, time, place, and circumstance; attentive and focused while undergoing testing  ? Appearance: Well-groomed, casually dressed  ? Gait/Posture: No abnormalities noted  ? Sensorimotor: Bilateral hand shaking at rest and in action  ? Behavior: Cooperative, pleasant, no behavioral abnormalities noted  ? Speech: Conversational word-finding difficulty; minimal speech output; generally fluent, articulate, normal rate, prosody, and volume  ? Thought process: Directions needed to be repeated for every task  ? Thought content: Logical, appropriate   ? Affect: Broad, responsive, consistent with reported mood; good eye contact; appeared tearful during testing  ? Mood: Appeared somewhat anxious   ? Insight and Judgment: Fair  ? Approach to testing: Efficient, deliberate; good frustration on cognitively demanding tasks  ? Rapport: Easily established and maintained      Activities included in this evaluation: CPT Code #Units   Psychiatric diagnostic interview [id number]   Test evaluation services by professional; first hour. [id number] 1   Test evaluation services by professional, additional hour (+) [id number]   Test administration and scoring by technician, first 30 mins [id number]   Test administration and scoring by technician, additional 30 mins (+) [id number]     Again, thank you for allowing me to participate in the care of your patient.      Sincerely,    SILVIA ZHU, PhD

## 2022-11-22 NOTE — NURSING NOTE
The patient was seen for neuropsychological evaluation at the request of Dr. Rafael Velasco, for the purposes of diagnostic clarification and treatment planning. 195 minutes of test administration and scoring were provided by this writer, Chauncey Gunn. Please see Dr. Dena Elias's report for a full interpretation of the findings.

## 2022-11-29 NOTE — PROGRESS NOTES
NEUROPSYCHOLOGICAL EVALUATION  **CONFIDENTIAL**      Name: Quang Gillespie Education: 12 years    (age): 1955 (67 years) MARTÍNEZ:  2022   Referral: Rafael Velasco MD  Department of Family Medicine Handedness:  MRN (Epic): Left  [id number]     IDENTIFYING INFORMATION AND REASON FOR REFERRAL   Mr. Gillespie is a 67-year-old, left-handed White male with a history of coronary artery disease, hypertension, and hyperlipidemia. This evaluation was requested to provide a comprehensive assessment of his current neuropsychological status to inform treatment planning.     IMPRESSION  See below for relevant background information and detailed test results. See separate abstract for supporting documentation including a list of neuropsychological measures and test scores.    In the context of estimated below average premorbid intellectual abilities, Mr. Gillespie s neuropsychological profile revealed impaired performance in the following domains:  1. Memory: Encoding and retrieval of verbal and visual information were severely impaired. Recognition of information was variable with below average and exceptionally low scores.   2. Executive functioning: Performance was severely impaired on tests of mental flexibility with psychomotor and visual scanning demands, as he was unable to complete the sample trial despite multiple attempts. Performance was severely impaired on a test of visuoconstruction requiring planning and organization. Performance was severely impaired on a test of letter-cue verbal fluency, a language task with executive demands. Moderately impaired verbal abstract reasoning performance may be consistent with premorbid estimates. Performance was variable on tests of working memory with low average and severely impaired scores.   3. Processing speed: Performances on psychomotor processing speed tasks were moderately to severely impaired.   4. Visuospatial: Copy of simple and complex figures were severely impaired  and notable for a dysexecutive, piecemeal approach, missing elements, incorrect placement and proportions, and overall distortion. Spatial perception performance was below average.   Additionally, he was not fully orientated to place or time (15 days off on the date). Performance was below expectation on a measure of conceptual understanding of issues pertaining to health and safety. His score was consistent with individuals functioning at a low level of independence in the community.     Performance was within expectation across other cognitive tests including immediate auditory attention, naming, semantic verbal fluency, and nonverbal abstract reasoning. Assessment of current psychological and emotional status revealed the absence of clinically significant depressive mood symptoms or excessive anxiety.     Overall, Mr. Gillespie s neuropsychological profile revealed multidomain cognitive impairment. Observed test performances and reported levels of day-to-day functioning are consistent with a diagnosis of Major Neurocognitive Disorder (i.e. dementia), the underlying basis of which remains to be specified. Although unclear developmental history notable for prematurity and longstanding difficulties with reading and communication may be contributory to suboptimal scores on cognitive testing, these findings raise concern for a neurodegenerative process such as Alzheimer s disease pathology. Cerebrovascular risk factors suggest vascular etiology may also be contributory.     DIAGNOSTIC IMPRESSIONS (ICD-10)  ? Major neurocognitive disorder (F03.90)    RECOMMENDATIONS  1. Results from the present evaluation revealed significant memory impairment and executive dysfunction. It is recommended that trusted family provide periodic oversight and monitoring for complex activities of daily living to ensure his safety and wellbeing are maintained.    2. Family members and providers may note that when communicating with Mr. Gillespie,  he may benefit from novel information being broken down in small chunks and repeated across time to facilitate recall.  Additionally, we encourage Mr. Gillespie to be accompanied to all medical appointments to ensure the accurate exchange and recollection of information.  3. Results from this evaluation reveal serious memory deficits in addition to trouble with visuospatial processing, executive functioning, and processing speed. These deficits place Mr. Gillespie at increased risk for driving-related incidents and we recommend that he delegate driving responsibilities to others at this time. If Mr. Gillespie continues driving, we recommend he undergo a formal safe  evaluation to ensure his safety and the safety others when operating a motor vehicle.   4. Mr. Gillespie is encouraged to continue to live a healthy lifestyle that promotes brain health, including getting appropriate exercise (as advised by his healthcare providers), getting regular sleep, and eating healthy.    5. We recommend that Mr. Gillespie continue to maintain a socially active lifestyle. He is likely to benefit from continuing to engage in social activities that he enjoys as these activities may offer cognitive stimulation, help to prevent feelings to depression, and provide emotional benefits that will maximize quality of life.   6. Mr. Gillespie s spouse may benefit from engagement in psychotherapy and/or participation in a local support group for caregivers to help cope with stress.   7. The following resources may be helpful to Mr. Gillespie and his family:  -Family members needing additional information regarding dementia can contact the Alzheimer s Disease and Related Disorders Association at 586-273-8719, or visit the website online at http://www.alz.org. This organization provides educational and referral resources for various types of dementia and provides information that may be beneficial now or in the future.    -The [facility]  Chapter of the Alzheimer s Association can provide further information about family support groups in the area, home health services, and respite services. Information is also available at their website: www.alz.org/[place]nd  -The Family Caregiver [place] website also has helpful information:  http://www.caregiver.org/caregiver/jsp/home.jsp.   -There are several resource guides that have been published for family members and caregivers of individuals with dementia.  -Creating Moments of Sarina by Alis Blevins provides a number of simple, helpful hints for caregivers.  -[facility] on Alzheimer s Disease provides practical information for families.  -A Dignified Life: The Best Friends Approach to Alzheimer s Care by DIMITRI Oliva M.S.REBEKAH. and SHAWN Rutherford, M.P.H.  -The 36-Hour Day: A Family Guide to Caring for People Who Have Alzheimer s Disease and Other Dementias (7th Edition) by Teresa Castellanos M.A. and Dom Jean M.D.  8. The current evaluation will serve as an objective cognitive baseline against which results of future evaluations may be compared.  Mr. Gillespie may be referred for a follow-up neuropsychological evaluation in 12-18 months to objectively assess neurocognitive change, particularly if changes are noted in cognitive and/or behavioral status.     Thank you for the opportunity to participate in Mr. Gillespie s care.  These findings and recommendations were reviewed with the patient and his wife in a separate feedback session on 11/28/2022 and all their questions were answered. If you have any questions regarding this evaluation, please do not hesitate to contact me.       Dena Elias, Ph.D.,   Clinical Neuropsychologist    RELEVANT BACKGROUND INFORMATION AND SUPPORTING DOCUMENTATION  Gathered from a clinical interview with the patient and his wife, and reviews of electronic medical record.     History of Presenting Problem(s)  Mr. Gillespie presented with a history of coronary artery disease, hypertension,  and hyperlipidemia. He and his wife reported longstanding difficulties with reading, spelling, and word-finding with long history of delayed speech initiation and impaired comprehension that have worsened in the past couple of years.  His wife described  blankness  and trouble with processing that represents a significant change. She described trouble completing tasks that used to be simple for him (e.g., measuring a closet, putting staples in a stable gun). She reported he seems to forget what he is doing in the middle of the task such as forgetting to close the refrigerator or flush the toilet, which are uncharacteristic. She specified these moments of  blankness  are not consistent, occur approximately once per week, more often in the evenings. 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 He reported forgetting his medications twice per week, which he has done for many years, but he denied trouble setting up his pillbox independently. His wife has always managed their finances, and although he was reportedly managing invoices for his job without difficulty, she indicated he would not be able to manage their finances at this time. His wife manages his medical appointments and stated he would be unable to manage this independently. He is reportedly independent with other activities of daily living including managing simple meal preparation, driving short distances, household chores, and basic self-care. 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 Physically, he and his wife note he is shaky at times, particularly when frustrated, but they denied other physical complaints or sensorimotor disturbances. Emotionally, he reported feeling  fine , and he denied prominent  symptoms of depression, anxiety, or psychological distress. His wife indicated he has never been one to communicate about his emotions and she believes he may have been depressed in the context of senior living, but this has since improved. He and his wife noted increased frustration, but they denied any other behavioral or personality changes including impulsive, compulsive, or inappropriate behavior, apathy or disinhibition.    Neurodiagnostic Findings   ? Brain MRI w/wo contrast (4/14/2022) FINDINGS: No abnormal intracranial restricted diffusion identified to suggest recent infarct. The ventricles are normal in size and configuration. Mild generalized brain parenchymal volume loss. Mild-to-moderate patchy nonspecific T2 FLAIR hyperintense signal changes in the cerebral white matter, as before, likely due to chronic small vessel ischemic disease. No intracranial hemorrhage or extra-axial fluid collection. No intracranial mass or abnormal postcontrast enhancement identified. The major arterial flow voids at the skull base appear to be grossly maintained.  Trace scattered paranasal sinus mucosal thickening. Trace fluid in the mastoid tips bilaterally. The calvarium, skull base, and midface otherwise appear unremarkable.  IMPRESSION: 1. No acute intracranial process. 2. Brain atrophy and presumed chronic small vessel ischemic changes, as described. No significant change compared to the prior study.    Medical History  ? Coronary artery disease  ? Hypertension  ? Hyperlipidemia    Health Behaviors   ? Sleep:  ~8 hours of sleep nightly; denied sleep disturbance; reported snoring but denied gasping arousals or parasomnic behavior  ? Exercise: Working around the house and helping his brothers with construction projects; he and his wife plan to get memberships to the [employer].   ? Pain: Occasional pain due to arthritis but denied pain at the time of this evaluation     Psychiatric History  ? Mr. Gillespie reported current mood is   claude . His wife stated he may have been mildly depressed upon [facility], but his mood has since improved.  ? He otherwise denied history of significant depressed mood, excessive rumination, worry, or uncontrollable anxiety, alteration of sensory perceptual processes or disordered thought.  ? Suicidality/ Self-harm: He denied any suicidal ideation, plan, or intent.  ? Psychiatric treatment: None currently or in the past    Substance Use History  ? Alcohol: 3 beers per week or less  ? Nicotine: None  ? Cannabis:  None  ? Problematic Substance Use: History of social heavy alcohol use  when younger  that may have interfered with occupational and/or social functioning; denied any substance use treatment; reduced alcohol use ~8 years ago    Current Medications (per EMR)    gabapentin (NEURONTIN) 300 MG capsule     aspirin 81 MG tablet     cyclobenzaprine (FLEXERIL) 10 MG tablet     lisinopril (ZESTRIL) 5 MG tablet     metoprolol tartrate (LOPRESSOR) 50 MG tablet     rosuvastatin (CRESTOR) 20 MG tablet     triamterene-HCTZ (MAXZIDE-25) 37.5-25 MG tablet     Developmental, Educational, & Occupational History  ? Gestational: Born premature (unknown gestational age) and reported he remained in the hospital for ~1 month after birth  ? Developmental milestones: Unknown if met at expected ages  ? Education: Reported difficulty with reading and spelling; obtained Ds in regular classes; reported trouble paying attention in school but was never diagnosed with ADHD; graduated high school on time and stated he graduated despite poor grades because he was a good athlete.  ? Occupation: Construction; retired    Psychosocial History  ? Born and raised in [place], [place]  ? Marital:  to spouse since 1990  ? Children: 2 sons  ? Housing: Lives with wife and 1 son  ? Social support:  Good  social support network of wife, sons, brothers, and neighbors    RESULTS  See separate abstract for list of neuropsychological measures and  test scores. Descriptive ranges are based on American Academy of Clinical Neuropsychology (2020) consensus guidelines, or test manuals where appropriate. All standardized scores are adjusted for age and additional adjustments for demographic factors such as education were performed where applicable.    PRE-MORBID ABILITY: Premorbid abilities were estimated within the below range based on single word reading ability. Reading skills were estimated at a 5th grade reading level.   GENERAL COGNITIVE STATUS: Orientation was within expectation for general personal information. He was 15 days off on the date but otherwise oriented to time. He stated the incorrect city where this evaluation was taking place. He was able to name the current, and two most recent [place] presidents.  Within the practical domain, performance was below expectation on a measure of conceptual understanding of issues pertaining to health and safety. His score was consistent with individuals functioning at a low level of independence in the community.  ATTENTION/EXECUTIVE FUNCTIONS: Immediate auditory attention performance was average. Working memory performances were variable with low average and exceptionally low scores. Overall attention and working memory performance was below average. Verbal abstract reasoning performance was below average. Visual reasoning through pattern identification was low average. Performance on a test of set-shifting/cognitive flexibility was discontinued due to inability to complete the sample trial despite multiple attempts. Verbal set-shifting was low average to exceptionally low. Copy of a complex figure requiring planning and organization was exceptionally low and notable for a dysexecutive and piecemeal approach resulting in missing and misplaced elements. Copy of a simple figure was also notable for a dysexecutive approach. Psychomotor processing speed performances were below average to exceptionally low.  LANGUAGE:  Confrontation naming performance was low average. Letter-cue verbal fluency performance was exceptionally low. Semantic verbal fluency performance was low average.   VISUOSPATIAL PROCESSING: Performance on a spatial perception task requiring judgement of angled lines was below average. Copy of increasingly intricate figures was below average. Copy of a complex figure was exceptionally low and notable for several missing and misplaced elements, and incorrect proportions.  LEARNING AND MEMORY: Initial encoding of a word list over multiple learning trials was exceptionally low. Delayed recall of the list was exceptionally low. Recognition of the word list was below average. Initial encoding of contextualized verbal information in the form of short stories was exceptionally low and delayed retrieval was exceptionally low. Recognition of story details was exceptionally low. Encoding and retrieval of visual information was exceptionally low. Recognition among distractors was normatively below average but notable for only 1/7 correct responses.   PSYCHOLOGICAL AND BEHAVIORAL: He endorsed minimal symptoms of depression and anxiety on self-report questionnaires.    PERFORMANCE VALIDITY: Performance on neuropsychological tests is dependent upon a number of factors, including sufficient engagement and motivation, to reliably establish an examinee s level of cognitive functioning.  Based upon observations made throughout the evaluation, the patient did not appear to deliberately perform in a suboptimal manner and demonstrated good frustration tolerance on cognitively challenging tasks. Score on an imbedded index of performance validity was in the valid range. Overall, test results are believed to accurately represent the patient s current neurocognitive status.    BEHAVIORAL OBSERVATIONS  ? Presented 70 minutes early and was accompanied by his wife  ? Alert, oriented to self, time, place, and circumstance; attentive and focused  while undergoing testing  ? Appearance: Well-groomed, casually dressed  ? Gait/Posture: No abnormalities noted  ? Sensorimotor: Bilateral hand shaking at rest and in action  ? Behavior: Cooperative, pleasant, no behavioral abnormalities noted  ? Speech: Conversational word-finding difficulty; minimal speech output; generally fluent, articulate, normal rate, prosody, and volume  ? Thought process: Directions needed to be repeated for every task  ? Thought content: Logical, appropriate   ? Affect: Broad, responsive, consistent with reported mood; good eye contact; appeared tearful during testing  ? Mood: Appeared somewhat anxious   ? Insight and Judgment: Fair  ? Approach to testing: Efficient, deliberate; good frustration on cognitively demanding tasks  ? Rapport: Easily established and maintained      Activities included in this evaluation: CPT Code #Units   Psychiatric diagnostic interview [id number]   Test evaluation services by professional; first hour. [id number]   Test evaluation services by professional, additional hour (+) [id number]   Test administration and scoring by technician, first 30 mins [id number]   Test administration and scoring by technician, additional 30 mins (+) [id number]

## 2022-12-10 ASSESSMENT — ENCOUNTER SYMPTOMS
HEADACHES: 0
DIARRHEA: 0
ARTHRALGIAS: 0
FEVER: 0
COUGH: 0
NAUSEA: 0
ABDOMINAL PAIN: 0
JOINT SWELLING: 0
PALPITATIONS: 0
DIZZINESS: 0
CONSTIPATION: 0
HEMATOCHEZIA: 0
EYE PAIN: 0
MYALGIAS: 1
DYSURIA: 0
SHORTNESS OF BREATH: 0
FREQUENCY: 0
HEARTBURN: 0
PARESTHESIAS: 0
SORE THROAT: 0
WEAKNESS: 0
CHILLS: 0
NERVOUS/ANXIOUS: 1
HEMATURIA: 0

## 2022-12-10 ASSESSMENT — ACTIVITIES OF DAILY LIVING (ADL): CURRENT_FUNCTION: NO ASSISTANCE NEEDED

## 2022-12-14 ENCOUNTER — OFFICE VISIT (OUTPATIENT)
Dept: FAMILY MEDICINE | Facility: CLINIC | Age: 67
End: 2022-12-14
Payer: COMMERCIAL

## 2022-12-14 VITALS
WEIGHT: 219 LBS | TEMPERATURE: 96.9 F | OXYGEN SATURATION: 97 % | SYSTOLIC BLOOD PRESSURE: 110 MMHG | HEIGHT: 70 IN | RESPIRATION RATE: 16 BRPM | HEART RATE: 79 BPM | BODY MASS INDEX: 31.35 KG/M2 | DIASTOLIC BLOOD PRESSURE: 60 MMHG

## 2022-12-14 DIAGNOSIS — M48.02 SPINAL STENOSIS IN CERVICAL REGION: ICD-10-CM

## 2022-12-14 DIAGNOSIS — F03.90 MAJOR NEUROCOGNITIVE DISORDER (H): ICD-10-CM

## 2022-12-14 DIAGNOSIS — Z28.20 VACCINE REFUSED BY PATIENT: ICD-10-CM

## 2022-12-14 DIAGNOSIS — Z00.00 ENCOUNTER FOR MEDICARE ANNUAL WELLNESS EXAM: Primary | ICD-10-CM

## 2022-12-14 DIAGNOSIS — E78.2 MIXED HYPERLIPIDEMIA: ICD-10-CM

## 2022-12-14 DIAGNOSIS — I10 BENIGN ESSENTIAL HYPERTENSION: ICD-10-CM

## 2022-12-14 DIAGNOSIS — M47.812 FACET ARTHROPATHY, CERVICAL: ICD-10-CM

## 2022-12-14 PROCEDURE — 99214 OFFICE O/P EST MOD 30 MIN: CPT | Mod: 25 | Performed by: FAMILY MEDICINE

## 2022-12-14 PROCEDURE — G0402 INITIAL PREVENTIVE EXAM: HCPCS | Performed by: FAMILY MEDICINE

## 2022-12-14 RX ORDER — METOPROLOL TARTRATE 50 MG
50 TABLET ORAL 2 TIMES DAILY
Qty: 180 TABLET | Refills: 3 | Status: SHIPPED | OUTPATIENT
Start: 2022-12-14 | End: 2024-02-19

## 2022-12-14 RX ORDER — LISINOPRIL 5 MG/1
5 TABLET ORAL DAILY
Qty: 90 TABLET | Refills: 3 | Status: SHIPPED | OUTPATIENT
Start: 2022-12-14 | End: 2024-02-21

## 2022-12-14 RX ORDER — TRIAMTERENE/HYDROCHLOROTHIAZID 37.5-25 MG
1 TABLET ORAL DAILY
Qty: 90 TABLET | Refills: 3 | Status: SHIPPED | OUTPATIENT
Start: 2022-12-14 | End: 2024-02-21

## 2022-12-14 RX ORDER — ROSUVASTATIN CALCIUM 20 MG/1
20 TABLET, COATED ORAL DAILY
Qty: 90 TABLET | Refills: 3 | Status: SHIPPED | OUTPATIENT
Start: 2022-12-14 | End: 2024-02-21

## 2022-12-14 RX ORDER — GABAPENTIN 100 MG/1
300 CAPSULE ORAL
Start: 2022-12-14 | End: 2022-12-27

## 2022-12-14 RX ORDER — CYCLOBENZAPRINE HCL 10 MG
5-10 TABLET ORAL
Qty: 20 TABLET | Refills: 0 | Status: SHIPPED | OUTPATIENT
Start: 2022-12-14 | End: 2023-05-08

## 2022-12-14 ASSESSMENT — ENCOUNTER SYMPTOMS
PALPITATIONS: 0
NERVOUS/ANXIOUS: 1
EYE PAIN: 0
DIZZINESS: 0
HEARTBURN: 0
PARESTHESIAS: 0
HEADACHES: 0
COUGH: 0
WEAKNESS: 0
NAUSEA: 0
ARTHRALGIAS: 0
ABDOMINAL PAIN: 0
FREQUENCY: 0
JOINT SWELLING: 0
MYALGIAS: 1
DYSURIA: 0
DIARRHEA: 0
SHORTNESS OF BREATH: 0
CHILLS: 0
HEMATOCHEZIA: 0
CONSTIPATION: 0
SORE THROAT: 0
FEVER: 0
HEMATURIA: 0

## 2022-12-14 ASSESSMENT — PAIN SCALES - GENERAL: PAINLEVEL: NO PAIN (0)

## 2022-12-14 ASSESSMENT — ACTIVITIES OF DAILY LIVING (ADL): CURRENT_FUNCTION: NO ASSISTANCE NEEDED

## 2022-12-14 NOTE — PROGRESS NOTES
"SUBJECTIVE:   Juancarlos is a 67 year old who presents for Preventive Visit.Patient has been advised of split billing requirements and indicates understanding: Yes  Are you in the first 12 months of your Medicare coverage?  Yes,  Visual Acuity:  Right Eye: 20/25  Left Eye: 20/25    Healthy Habits:     In general, how would you rate your overall health?  Good    Frequency of exercise:  2-3 days/week    Duration of exercise:  15-30 minutes    Do you usually eat at least 4 servings of fruit and vegetables a day, include whole grains    & fiber and avoid regularly eating high fat or \"junk\" foods?  Yes    Taking medications regularly:  Yes    Ability to successfully perform activities of daily living:  No assistance needed    Home Safety:  No safety concerns identified    Hearing Impairment:  Difficulty following a conversation in a noisy restaurant or crowded room, need to ask people to speak up or repeat themselves and difficulty understanding soft or whispered speech    In the past 6 months, have you been bothered by leaking of urine?  No    In general, how would you rate your overall mental or emotional health?  Good      PHQ-2 Total Score: 0    Additional concerns today:  Yes (Would like to discuss his last office visit that he had on 11/22 with the neuropsych.)    Patient has been to neuropsych for evaluation. Report in chart. Patient was found to have major neurocognitive disorder, likely Alzheimer's.   Patient and spouse said patient has had no significantly new symptom since last visit.  He has been open about his diagnosis to his sons and close family and close friends.  He and spouse asked about what can be done for his condition.    Have you ever done Advance Care Planning? (For example, a Health Directive, POLST, or a discussion with a medical provider or your loved ones about your wishes): Yes, advance care planning is on file.    Fall risk  Fallen 2 or more times in the past year?: No  Any fall with injury in the " past year?: No  click delete button to remove this line now  Cognitive Screening   1) Repeat 3 items (Leader, Season, Table)    2) Clock draw: ABNORMAL  3) 3 item recall: Recalls 1 object   Results: ABNORMAL clock, 1-2 items recalled: PROBABLE COGNITIVE IMPAIRMENT, **INFORM PROVIDER**    Mini-CogTM Copyright MATEO Lin. Licensed by the author for use in [facility]; reprinted with permission (cecil@[facility]). All rights reserved.      Do you have sleep apnea, excessive snoring or daytime drowsiness?: no    Reviewed and updated as needed this visit by clinical staff   Tobacco   Meds              Reviewed and updated as needed this visit by Provider     Meds             Social History     Tobacco Use     Smoking status: Never     Smokeless tobacco: Never   Substance Use Topics     Alcohol use: Yes     Comment: Very seldom     If you drink alcohol do you typically have >3 drinks per day or >7 drinks per week? No    Alcohol Use 12/14/2022   Prescreen: >3 drinks/day or >7 drinks/week? -   Prescreen: >3 drinks/day or >7 drinks/week? No   No flowsheet data found.      Current providers sharing in care for this patient include:   Patient Care Team:  Rafael Velasco MD as PCP - General (Family Practice)  Rafael Velasco MD as Assigned PCP    The following health maintenance items are reviewed in Epic and correct as of today:  Health Maintenance   Topic Date Due     AORTIC ANEURYSM SCREENING (SYSTEM ASSIGNED)  Never done     ANNUAL REVIEW OF HM ORDERS  06/01/2023     MEDICARE ANNUAL WELLNESS VISIT  12/14/2023     FALL RISK ASSESSMENT  12/14/2023     LIPID  01/10/2027     ADVANCE CARE PLANNING  12/14/2027     COLORECTAL CANCER SCREENING  02/23/2028     DTAP/TDAP/TD IMMUNIZATION (3 - Td or Tdap) 06/09/2028     HEPATITIS C SCREENING  Completed     HIV SCREENING  Completed     PHQ-2 (once per calendar year)  Completed     IPV IMMUNIZATION  Aged Out     MENINGITIS IMMUNIZATION  Aged Out      INFLUENZA VACCINE  Discontinued     Pneumococcal Vaccine: 65+ Years  Discontinued     ZOSTER IMMUNIZATION  Discontinued     COVID-19 Vaccine  Discontinued     Patient Active Problem List   Diagnosis     Mixed hyperlipidemia     Benign essential hypertension     Coronary artery disease involving native coronary artery of native heart without angina pectoris     Advanced directives, counseling/discussion     Impacted cerumen of both ears     Non morbid obesity, unspecified obesity type     Vaccine refused by patient     Past Surgical History:   Procedure Laterality Date     CARDIAC SURGERY      1 stentplaced     COLONOSCOPY N/A 2/23/2018    Procedure: COLONOSCOPY;  colonoscopy;  Surgeon: Daniel Caldera MD;  Location: [facility]       Social History     Tobacco Use     Smoking status: Never     Smokeless tobacco: Never   Substance Use Topics     Alcohol use: Yes     Comment: Very seldom     Family History   Problem Relation Age of Onset     Cancer Mother         brain tumor     C.A.D. Mother      Alcohol/Drug Mother         smoker     Cancer Father         hodgkins     Alcohol/Drug Father         smoker     Other Cancer Father      Diabetes Father      Hypertension Brother          Current Outpatient Medications   Medication Sig Dispense Refill     aspirin 81 MG tablet Take 1 tablet by mouth daily.       cyclobenzaprine (FLEXERIL) 10 MG tablet Take 0.5-1 tablets (5-10 mg) by mouth nightly as needed for muscle spasms 20 tablet 0     gabapentin (NEURONTIN) 100 MG capsule Take 3 capsules (300 mg) by mouth nightly as needed for neuropathic pain       lisinopril (ZESTRIL) 5 MG tablet Take 1 tablet (5 mg) by mouth daily 90 tablet 3     metoprolol tartrate (LOPRESSOR) 50 MG tablet Take 1 tablet (50 mg) by mouth 2 times daily 180 tablet 3     rosuvastatin (CRESTOR) 20 MG tablet Take 1 tablet (20 mg) by mouth daily Hold until patient calls for refill. 90 tablet 3     triamterene-HCTZ (MAXZIDE-25) 37.5-25 MG tablet Take 1 tablet by  "mouth daily 90 tablet 3     No Known Allergies  Pneumonia Vaccine: patient declines         Review of Systems   Constitutional: Negative for chills and fever.   HENT: Positive for hearing loss. Negative for congestion, ear pain and sore throat.    Eyes: Positive for visual disturbance. Negative for pain.   Respiratory: Negative for cough and shortness of breath.    Cardiovascular: Negative for chest pain, palpitations and peripheral edema.   Gastrointestinal: Negative for abdominal pain, constipation, diarrhea, heartburn, hematochezia and nausea.   Genitourinary: Negative for dysuria, frequency, genital sores, hematuria, impotence, penile discharge and urgency.   Musculoskeletal: Positive for myalgias. Negative for arthralgias and joint swelling.   Skin: Negative for rash.   Neurological: Negative for dizziness, weakness, headaches and paresthesias.   Psychiatric/Behavioral: Negative for mood changes. The patient is nervous/anxious.          OBJECTIVE:   /60 (BP Location: Left arm, Patient Position: Sitting, Cuff Size: Adult Large)   Pulse 79   Temp 96.9  F (36.1  C) (Tympanic)   Resp 16   Ht 1.778 m (5' 10\")   Wt 99.3 kg (219 lb)   SpO2 97%   BMI 31.42 kg/m   Estimated body mass index is 31.42 kg/m  as calculated from the following:    Height as of this encounter: 1.778 m (5' 10\").    Weight as of this encounter: 99.3 kg (219 lb).  Physical Exam  GENERAL APPEARANCE: healthy, alert and no distress  EYES: pink conj, no icterus, PERRL, EOMI  HENT: ear canals and TM's normal, nose and mouth without ulcers or lesions, oropharynx clear and oral mucous membranes moist  NECK: no adenopathy, no asymmetry, masses, or scars and thyroid normal to palpation  RESP: lungs clear to auscultation - no rales, rhonchi or wheezes  CV: regular rates and rhythm, normal S1 S2, no S3 or S4, no murmur, click or rub, no peripheral edema and peripheral pulses strong  ABDOMEN: soft, nontender, no hepatosplenomegaly, no masses and " bowel sounds normal  RECTAL: deferred  MS: no musculoskeletal defects are noted and gait is age appropriate without ataxia  SKIN: no suspicious lesions or rashes  NEURO: Normal strength and tone, sensory exam grossly normal, mentation intact and speech normal    Diagnostic Test Results:  none     ASSESSMENT / PLAN:   Quang was seen today for physical.    Diagnoses and all orders for this visit:    Encounter for Medicare annual wellness exam  Patient was advised on recommended screening and preventive health recommendations.  He verbalized understanding and agreed to the plans below.    Major neurocognitive disorder (H)  -     Adult Neurology  Referral; Future  Patient and spouse were advised in detail nature, course, possible treatments and eventual outcomes of the condition.  Advised it is a progressive condition.  Encouraged mentally stimulating activities.  Encouraged patient about his openness to talk about his condition.  Referral to neurology advised and they concurred. Will defer decision for medication to them.  Return precautions discussed and given to patient.     Benign essential hypertension  -     metoprolol tartrate (LOPRESSOR) 50 MG tablet; Take 1 tablet (50 mg) by mouth 2 times daily  -     triamterene-HCTZ (MAXZIDE-25) 37.5-25 MG tablet; Take 1 tablet by mouth daily  -     lisinopril (ZESTRIL) 5 MG tablet; Take 1 tablet (5 mg) by mouth daily  -     Comprehensive metabolic panel; Future  -     Lipid panel reflex to direct LDL Fasting; Future  Controlled.  Low salt, low fat diet.   Exercise as tolerated.  Take meds as prescribed; call if with side effects.     Mixed hyperlipidemia  -     rosuvastatin (CRESTOR) 20 MG tablet; Take 1 tablet (20 mg) by mouth daily Hold until patient calls for refill.  -     Lipid panel reflex to direct LDL Fasting; Future  Reinforced heart healthy lifestyle.    Facet arthropathy, cervical  -     cyclobenzaprine (FLEXERIL) 10 MG tablet; Take 0.5-1 tablets (5-10  "mg) by mouth nightly as needed for muscle spasms  -     gabapentin (NEURONTIN) 100 MG capsule; Take 3 capsules (300 mg) by mouth nightly as needed for neuropathic pain  Stable.  Safe use of meds advised. Updated gabapentin dose per his report.    Vaccine refused by patient  Patient has declined vaccines.   His wife asked about the study that Verna told them about the role of mercury preservative in possibly causing brain dysfunction. Hence their now hesitancy for vaccines.  They were advised that current literature and evidence support the use and value of vaccines in preventing morbidity and mortality. There is no current evidence to support vaccines causing dementia.  They were advised to reconsider.    Spinal stenosis in cervical region  -     gabapentin (NEURONTIN) 100 MG capsule; Take 3 capsules (300 mg) by mouth nightly as needed for neuropathic pain        Patient has been advised of split billing requirements and indicates understanding: Yes      COUNSELING:  Reviewed preventive health counseling, as reflected in patient instructions      BMI:   Estimated body mass index is 31.42 kg/m  as calculated from the following:    Height as of this encounter: 1.778 m (5' 10\").    Weight as of this encounter: 99.3 kg (219 lb).   Weight management plan: Discussed healthy diet and exercise guidelines      He reports that he has never smoked. He has never used smokeless tobacco.      Appropriate preventive services were discussed with this patient, including applicable screening as appropriate for cardiovascular disease, diabetes, osteopenia/osteoporosis, and glaucoma.  As appropriate for age/gender, discussed screening for colorectal cancer, prostate cancer, breast cancer, and cervical cancer. Checklist reviewing preventive services available has been given to the patient.    Reviewed patients plan of care and provided an AVS. The Basic Care Plan (routine screening as documented in Health Maintenance) and Complex Care " Plan (for patients with higher acuity and needing more deliberate coordination of services) for Quang meets the Care Plan requirement. This Care Plan has been established and reviewed with the Patient and spouse.      Rafael Velasco MD  [facility]    Identified Health Risks:

## 2022-12-14 NOTE — PATIENT INSTRUCTIONS
Read more information about your conditions in the handouts attached to this visit summary.     Schedule fasting lab appointment.    Referrals to neurology has been signed. Schedulers will call you in the next 3-5 business days. ]    Be consistent with low salt, low trans fat and low saturated fat diet.  Eat food rich in omega-3-fatty acids as you tolerate. (salmon, olive oil)  Eat 5 cups of vegetables, fruits and whole grains per day.  Limit starchy food (white rice, white bread, white pasta, white potatoes) to less than a cup per meal.  Minimize sweets, junk food and fastfood. Limit soda beverages to one serving per day; best to avoid it altogether though.    Exercise: moderate intensity sustained for at least 30 mins per episode, goal of 150 mins per week at least  Combine cardiovascular and resistance exercises.  These exercise recommendations are in addition to your daily activity at work or home.  Work on losing weight.    Preventative Care Visits include: Yearly physicals, Well-child visits, Welcome to Medicare visits, & Medicare yearly wellness exams.    The purpose of these visits is to discuss your medical history and prevent health problems before you are sick.  You may need to pay a copay, coinsurance or deductible if your visit today includes testing or treating for a new or existing condition.    Additional charges may be incurred for today's visit. If you have questions about what your insurance plan covers, please contact your Insurance Company's member service department.  If you have questions specific to a bill you have already received from [employer], please contact the [place] Billing Office at [phone].    Patient Education   Personalized Prevention Plan  You are due for the preventive services outlined below.  Your care team is available to assist you in scheduling these services.  If you have already completed any of these items, please share that information with your care team to  update in your medical record.  Health Maintenance Due   Topic Date Due    Zoster (Shingles) Vaccine (1 of 2) Never done    COVID-19 Vaccine (4 - Booster for Moderna series) 03/07/2022    Flu Vaccine (1) 09/01/2022    AORTIC ANEURYSM SCREENING (SYSTEM ASSIGNED)  Never done    Pneumococcal Vaccine (1 - PCV) 11/04/2022     Preventive Health Recommendations  See your health care provider every year to  Review health changes.   Discuss preventive care.    Review your medicines if your doctor has prescribed any.  Talk with your health care provider about whether you should have a test to screen for prostate cancer (PSA).  Every 3 years, have a diabetes test (fasting glucose). If you are at risk for diabetes, you should have this test more often.  Every 5 years, have a cholesterol test. Have this test more often if you are at risk for high cholesterol or heart disease.   Every 10 years, have a colonoscopy. Or, have a yearly FIT test (stool test). These exams will check for colon cancer.  Talk to with your health care provider about screening for Abdominal Aortic Aneurysm if you have a family history of AAA or have a history of smoking.    Shots:   Get a flu shot each year.   Get a tetanus shot every 10 years.   Talk to your doctor about your pneumonia vaccines. There are now two you should receive - Pneumovax (PPSV 23) and Prevnar (PCV 13).  Talk to your pharmacist about a shingles vaccine.   Talk to your doctor about the hepatitis B vaccine.    Nutrition:   Eat at least 5 servings of fruits and vegetables each day.   Eat whole-grain bread, whole-wheat pasta and brown rice instead of white grains and rice.   Get adequate Calcium and Vitamin D.     Lifestyle  Exercise for at least 150 minutes a week (30 minutes a day, 5 days a week). This will help you control your weight and prevent disease.   Limit alcohol to one drink per day.   No smoking.   Wear sunscreen to prevent skin cancer.   See your dentist every six months for  an exam and cleaning.   See your eye doctor every 1 to 2 years to screen for conditions such as glaucoma, macular degeneration and cataracts.    Personalized Prevention Plan  You are due for the preventive services outlined below.  Your care team is available to assist you in scheduling these services.  If you have already completed any of these items, please share that information with your care team to update in your medical record.  Health Maintenance   Topic Date Due    ZOSTER IMMUNIZATION (1 of 2) Never done    COVID-19 Vaccine (4 - Booster for Moderna series) 03/07/2022    INFLUENZA VACCINE (1) 09/01/2022    AORTIC ANEURYSM SCREENING (SYSTEM ASSIGNED)  Never done    Pneumococcal Vaccine: 65+ Years (1 - PCV) 11/04/2022    ANNUAL REVIEW OF HM ORDERS  06/01/2023    MEDICARE ANNUAL WELLNESS VISIT  12/14/2023    FALL RISK ASSESSMENT  12/14/2023    LIPID  01/10/2027    ADVANCE CARE PLANNING  12/14/2027    COLORECTAL CANCER SCREENING  02/23/2028    DTAP/TDAP/TD IMMUNIZATION (3 - Td or Tdap) 06/09/2028    HEPATITIS C SCREENING  Completed    HIV SCREENING  Completed    PHQ-2 (once per calendar year)  Completed    IPV IMMUNIZATION  Aged Out    MENINGITIS IMMUNIZATION  Aged Out       Signs of Hearing Loss      Hearing much better with one ear can be a sign of hearing loss.   Hearing loss is a problem shared by many people. In fact, it is one of the most common health problems, particularly as people age. Most people age 65 and older have some hearing loss. By age 80, almost everyone does. Hearing loss often occurs slowly over the years. So you may not realize your hearing has gotten worse.  Have your hearing checked  Call your healthcare provider if you:  Have to strain to hear normal conversation  Have to watch other people s faces very carefully to follow what they re saying  Need to ask people to repeat what they ve said  Often misunderstand what people are saying  Turn the volume of the television or radio up so high  that others complain  Feel that people are mumbling when they re talking to you  Find that the effort to hear leaves you feeling tired and irritated  Notice, when using the phone, that you hear better with one ear than the other  [employer] last reviewed this educational content on 1/1/2020 2000-2021 [employer]. All rights reserved. This information is not intended as a substitute for professional medical care. Always follow your healthcare professional's instructions.

## 2022-12-23 ENCOUNTER — LAB (OUTPATIENT)
Dept: LAB | Facility: CLINIC | Age: 67
End: 2022-12-23
Payer: COMMERCIAL

## 2022-12-23 DIAGNOSIS — E78.2 MIXED HYPERLIPIDEMIA: ICD-10-CM

## 2022-12-23 DIAGNOSIS — I10 BENIGN ESSENTIAL HYPERTENSION: ICD-10-CM

## 2022-12-23 LAB
ALBUMIN SERPL BCG-MCNC: 4.1 G/DL (ref 3.5–5.2)
ALP SERPL-CCNC: 75 U/L (ref 40–129)
ALT SERPL W P-5'-P-CCNC: 23 U/L (ref 10–50)
ANION GAP SERPL CALCULATED.3IONS-SCNC: 6 MMOL/L (ref 7–15)
AST SERPL W P-5'-P-CCNC: 23 U/L (ref 10–50)
BILIRUB SERPL-MCNC: 0.6 MG/DL
BUN SERPL-MCNC: 25.4 MG/DL (ref 8–23)
CALCIUM SERPL-MCNC: 9.6 MG/DL (ref 8.8–10.2)
CHLORIDE SERPL-SCNC: 102 MMOL/L (ref 98–107)
CHOLEST SERPL-MCNC: 151 MG/DL
CREAT SERPL-MCNC: 1.12 MG/DL (ref 0.67–1.17)
DEPRECATED HCO3 PLAS-SCNC: 31 MMOL/L (ref 22–29)
GFR SERPL CREATININE-BSD FRML MDRD: 73 ML/MIN/1.73M2
GLUCOSE SERPL-MCNC: 122 MG/DL (ref 70–99)
HDLC SERPL-MCNC: 46 MG/DL
LDLC SERPL CALC-MCNC: 93 MG/DL
NONHDLC SERPL-MCNC: 105 MG/DL
POTASSIUM SERPL-SCNC: 4.6 MMOL/L (ref 3.4–5.3)
PROT SERPL-MCNC: 6.6 G/DL (ref 6.4–8.3)
SODIUM SERPL-SCNC: 139 MMOL/L (ref 136–145)
TRIGL SERPL-MCNC: 62 MG/DL

## 2022-12-23 PROCEDURE — 80053 COMPREHEN METABOLIC PANEL: CPT

## 2022-12-23 PROCEDURE — 80061 LIPID PANEL: CPT

## 2022-12-23 PROCEDURE — 36415 COLL VENOUS BLD VENIPUNCTURE: CPT

## 2022-12-27 ENCOUNTER — MYC REFILL (OUTPATIENT)
Dept: FAMILY MEDICINE | Facility: CLINIC | Age: 67
End: 2022-12-27

## 2022-12-27 DIAGNOSIS — M48.02 SPINAL STENOSIS IN CERVICAL REGION: ICD-10-CM

## 2022-12-27 DIAGNOSIS — M47.812 FACET ARTHROPATHY, CERVICAL: ICD-10-CM

## 2022-12-28 RX ORDER — GABAPENTIN 100 MG/1
300 CAPSULE ORAL
Qty: 180 CAPSULE | Refills: 1 | Status: SHIPPED | OUTPATIENT
Start: 2022-12-28 | End: 2023-11-10

## 2022-12-28 NOTE — TELEPHONE ENCOUNTER
Routing to ordering provider for consideration, not on refill protocol.           Rosalie Tanner     RN MSN

## 2023-02-15 ENCOUNTER — TRANSFERRED RECORDS (OUTPATIENT)
Dept: HEALTH INFORMATION MANAGEMENT | Facility: CLINIC | Age: 68
End: 2023-02-15

## 2023-04-03 ENCOUNTER — TRANSFERRED RECORDS (OUTPATIENT)
Dept: HEALTH INFORMATION MANAGEMENT | Facility: CLINIC | Age: 68
End: 2023-04-03
Payer: COMMERCIAL

## 2023-04-19 ENCOUNTER — HOSPITAL ENCOUNTER (OUTPATIENT)
Dept: PET IMAGING | Facility: HOSPITAL | Age: 68
Discharge: HOME | End: 2023-04-19
Attending: PSYCHIATRY & NEUROLOGY | Admitting: PSYCHIATRY & NEUROLOGY
Payer: COMMERCIAL

## 2023-04-19 DIAGNOSIS — F03.90 DEMENTIA (H): ICD-10-CM

## 2023-04-19 DIAGNOSIS — G30.9 ALZHEIMER'S DISEASE (H): ICD-10-CM

## 2023-04-19 DIAGNOSIS — F02.80 ALZHEIMER'S DISEASE (H): ICD-10-CM

## 2023-04-19 LAB — GLUCOSE BLDC GLUCOMTR-MCNC: 104 MG/DL (ref 70–99)

## 2023-04-19 PROCEDURE — 82962 GLUCOSE BLOOD TEST: CPT

## 2023-04-19 PROCEDURE — 78608 BRAIN IMAGING (PET): CPT | Mod: PI

## 2023-04-19 PROCEDURE — A9552 F18 FDG: HCPCS

## 2023-04-19 PROCEDURE — 343N000001 HC RX 343

## 2023-04-19 RX ADMIN — FLUDEOXYGLUCOSE F-18 9.95 MILLICURIE: 500 INJECTION, SOLUTION INTRAVENOUS at 11:54

## 2023-05-08 ENCOUNTER — TRANSFERRED RECORDS (OUTPATIENT)
Dept: HEALTH INFORMATION MANAGEMENT | Facility: CLINIC | Age: 68
End: 2023-05-08
Payer: COMMERCIAL

## 2023-05-08 ENCOUNTER — MYC REFILL (OUTPATIENT)
Dept: FAMILY MEDICINE | Facility: CLINIC | Age: 68
End: 2023-05-08
Payer: COMMERCIAL

## 2023-05-08 DIAGNOSIS — M47.812 FACET ARTHROPATHY, CERVICAL: ICD-10-CM

## 2023-05-09 NOTE — TELEPHONE ENCOUNTER
Pending Prescriptions:                       Disp   Refills    cyclobenzaprine (FLEXERIL) 10 MG tablet   20 tab*0            Sig: Take 0.5-1 tablets (5-10 mg) by mouth nightly as           needed for muscle spasms    Routing refill request to provider for review/approval because:  Drug not on the G refill protocol       Trevor Kirkland RN

## 2023-05-11 RX ORDER — CYCLOBENZAPRINE HCL 10 MG
5-10 TABLET ORAL
Qty: 20 TABLET | Refills: 0 | Status: SHIPPED | OUTPATIENT
Start: 2023-05-11 | End: 2024-04-10

## 2023-08-28 ENCOUNTER — TELEPHONE (OUTPATIENT)
Dept: FAMILY MEDICINE | Facility: CLINIC | Age: 68
End: 2023-08-28
Payer: COMMERCIAL

## 2023-08-28 NOTE — TELEPHONE ENCOUNTER
Please schedule in-clinic visit so it can be determined that he does have impacted cerumen, and ears irrigated if appropriate.

## 2023-08-30 ENCOUNTER — OFFICE VISIT (OUTPATIENT)
Dept: FAMILY MEDICINE | Facility: CLINIC | Age: 68
End: 2023-08-30
Payer: COMMERCIAL

## 2023-08-30 VITALS
HEART RATE: 54 BPM | TEMPERATURE: 96.9 F | RESPIRATION RATE: 20 BRPM | DIASTOLIC BLOOD PRESSURE: 76 MMHG | OXYGEN SATURATION: 96 % | SYSTOLIC BLOOD PRESSURE: 112 MMHG | WEIGHT: 229.4 LBS | HEIGHT: 69 IN | BODY MASS INDEX: 33.98 KG/M2

## 2023-08-30 DIAGNOSIS — F03.90 MAJOR NEUROCOGNITIVE DISORDER (H): Primary | ICD-10-CM

## 2023-08-30 DIAGNOSIS — H61.23 BILATERAL IMPACTED CERUMEN: ICD-10-CM

## 2023-08-30 PROCEDURE — 69209 REMOVE IMPACTED EAR WAX UNI: CPT | Mod: 50 | Performed by: FAMILY MEDICINE

## 2023-08-30 PROCEDURE — 2894A VOIDCORRECT: CPT | Mod: 25 | Performed by: FAMILY MEDICINE

## 2023-08-30 ASSESSMENT — PAIN SCALES - GENERAL: PAINLEVEL: NO PAIN (0)

## 2023-08-30 NOTE — PROGRESS NOTES
"  Assessment & Plan     Bilateral impacted cerumen  Ear irrigation performed and it was successful.     Major neurocognitive disorder (H)  No change         BMI:   Estimated body mass index is 33.63 kg/m  as calculated from the following:    Height as of this encounter: 1.759 m (5' 9.25\").    Weight as of this encounter: 104.1 kg (229 lb 6.4 oz).       FUTURE APPOINTMENTS:       - Follow-up visit as needed    Je Henley MD  [facility] 67 [facility], presenting for the following health issues:    Patient is a 67 yr old male here for wax build up. Says he is having hearing aids placed and he needs his ears cleaned out. Denies any dizziness . No ear drainage.   Cerumen (impacted) (Needs ears cleaned before getting hearing aids. )      8/30/2023    10:35 AM   Additional Questions   Roomed by Connie Espinoza MA   Accompanied by wife (Cassie)         8/30/2023    10:35 AM   Patient Reported Additional Medications   Patient reports taking the following new medications none       History of Present Illness       Reason for visit:  Ear cleaning    He eats 0-1 servings of fruits and vegetables daily.He consumes 1 sweetened beverage(s) daily.He exercises with enough effort to increase his heart rate 20 to 29 minutes per day.  He exercises with enough effort to increase his heart rate 4 days per week. He is missing 1 dose(s) of medications per week.             Review of Systems   Constitutional, HEENT, cardiovascular, pulmonary, gi and gu systems are negative, except as otherwise noted.      Objective    /76 (BP Location: Right arm, Patient Position: Sitting, Cuff Size: Adult Large)   Pulse 54   Temp 96.9  F (36.1  C) (Tympanic)   Resp 20   Ht 1.759 m (5' 9.25\")   Wt 104.1 kg (229 lb 6.4 oz)   SpO2 96%   BMI 33.63 kg/m    Body mass index is 33.63 kg/m .  Physical Exam  Constitutional:       Appearance: Normal appearance.   HENT:      Right Ear: There is " impacted cerumen.      Left Ear: There is impacted cerumen.   Eyes:      Extraocular Movements: Extraocular movements intact.      Pupils: Pupils are equal, round, and reactive to light.   Cardiovascular:      Rate and Rhythm: Normal rate and regular rhythm.      Pulses: Normal pulses.      Heart sounds: Normal heart sounds.   Musculoskeletal:         General: Normal range of motion.   Neurological:      Mental Status: He is alert and oriented to person, place, and time.   Psychiatric:         Mood and Affect: Mood normal.         Behavior: Behavior normal.

## 2023-11-08 DIAGNOSIS — M48.02 SPINAL STENOSIS IN CERVICAL REGION: ICD-10-CM

## 2023-11-08 DIAGNOSIS — M47.812 FACET ARTHROPATHY, CERVICAL: ICD-10-CM

## 2023-11-10 RX ORDER — GABAPENTIN 100 MG/1
300 CAPSULE ORAL
Qty: 180 CAPSULE | Refills: 1 | Status: SHIPPED | OUTPATIENT
Start: 2023-11-10 | End: 2024-04-10

## 2023-11-14 ENCOUNTER — PATIENT OUTREACH (OUTPATIENT)
Dept: CARE COORDINATION | Facility: CLINIC | Age: 68
End: 2023-11-14
Payer: COMMERCIAL

## 2023-11-26 NOTE — PATIENT INSTRUCTIONS
Thank you for choosing [facility].  You may be receiving a survey in the mail from Dennise Majano regarding your visit today.  Please take a few minutes to complete and return the survey to let us know how we are doing.      If you have questions or concerns, please contact us via [employer] or you can contact your care team at [phone].    Our Clinic hours are:  Monday 6:40 am  to 7:00 pm  Tuesday -Friday 6:40 am to 5:00 pm    The [place] outpatient lab hours are:  Monday - Friday 6:10 am to 4:45 pm  Saturdays 7:00 am to 11:00 am  Appointments are required, call [phone]    If you have clinical questions after hours or would like to schedule an appointment,  call the clinic at [phone].   no

## 2023-11-28 ENCOUNTER — PATIENT OUTREACH (OUTPATIENT)
Dept: CARE COORDINATION | Facility: CLINIC | Age: 68
End: 2023-11-28
Payer: COMMERCIAL

## 2024-02-01 ENCOUNTER — TRANSFERRED RECORDS (OUTPATIENT)
Dept: HEALTH INFORMATION MANAGEMENT | Facility: CLINIC | Age: 69
End: 2024-02-01
Payer: COMMERCIAL

## 2024-02-17 ENCOUNTER — HEALTH MAINTENANCE LETTER (OUTPATIENT)
Age: 69
End: 2024-02-17

## 2024-02-18 DIAGNOSIS — I10 BENIGN ESSENTIAL HYPERTENSION: ICD-10-CM

## 2024-02-18 DIAGNOSIS — E78.2 MIXED HYPERLIPIDEMIA: ICD-10-CM

## 2024-02-19 RX ORDER — METOPROLOL TARTRATE 50 MG
50 TABLET ORAL 2 TIMES DAILY
Qty: 180 TABLET | Refills: 1 | Status: SHIPPED | OUTPATIENT
Start: 2024-02-19 | End: 2024-05-26

## 2024-02-21 RX ORDER — LISINOPRIL 5 MG/1
5 TABLET ORAL DAILY
Qty: 90 TABLET | Refills: 0 | Status: SHIPPED | OUTPATIENT
Start: 2024-02-21 | End: 2024-04-10

## 2024-02-21 RX ORDER — ROSUVASTATIN CALCIUM 20 MG/1
20 TABLET, COATED ORAL DAILY
Qty: 90 TABLET | Refills: 0 | Status: SHIPPED | OUTPATIENT
Start: 2024-02-21 | End: 2024-04-10

## 2024-02-21 RX ORDER — TRIAMTERENE/HYDROCHLOROTHIAZID 37.5-25 MG
1 TABLET ORAL DAILY
Qty: 90 TABLET | Refills: 0 | Status: SHIPPED | OUTPATIENT
Start: 2024-02-21 | End: 2024-03-11

## 2024-03-10 ENCOUNTER — MYC REFILL (OUTPATIENT)
Dept: FAMILY MEDICINE | Facility: CLINIC | Age: 69
End: 2024-03-10
Payer: COMMERCIAL

## 2024-03-10 DIAGNOSIS — M47.812 FACET ARTHROPATHY, CERVICAL: ICD-10-CM

## 2024-03-11 DIAGNOSIS — I10 BENIGN ESSENTIAL HYPERTENSION: ICD-10-CM

## 2024-03-11 DIAGNOSIS — E78.2 MIXED HYPERLIPIDEMIA: Primary | ICD-10-CM

## 2024-03-11 RX ORDER — TRIAMTERENE/HYDROCHLOROTHIAZID 37.5-25 MG
1 TABLET ORAL DAILY
Qty: 90 TABLET | Refills: 3 | Status: SHIPPED | OUTPATIENT
Start: 2024-03-11

## 2024-03-11 RX ORDER — CYCLOBENZAPRINE HCL 10 MG
5-10 TABLET ORAL
Qty: 20 TABLET | Refills: 0 | OUTPATIENT
Start: 2024-03-11

## 2024-03-11 NOTE — TELEPHONE ENCOUNTER
Has appointment scheduled for 3/21/24      Requested Prescriptions   Pending Prescriptions Disp Refills    triamterene-HCTZ (MAXZIDE-25) 37.5-25 MG tablet [Pharmacy Med Name: TRIAMTERENE-HCTZ 37.5-25MG TABS] 90 tablet 0     Sig: TAKE ONE TABLET BY MOUTH ONCE DAILY       Diuretics (Including Combos) Protocol Failed - 3/11/2024  2:48 PM        Failed - Has GFR on file in past 12 months and most recent value is normal        Passed - Blood pressure under 140/90 in past 12 months     BP Readings from Last 3 Encounters:   08/30/23 112/76   12/14/22 110/60   07/29/22 (!) 156/77                 Passed - Medication is active on med list        Passed - Medication indicated for associated diagnosis     Medication is associated with one or more of the following diagnoses:     Edema   Hypertension   Heart Failure   Meniere's Disease          Passed - Recent (12 mo) or future (90 days) visit within the authorizing provider's specialty     The patient must have completed an in-person or virtual visit within the past 12 months or has a future visit scheduled within the next 90 days with the authorizing provider s specialty.  Urgent care and e-visits do not quality as an office visit for this protocol.          Passed - Patient is age 18 or older

## 2024-04-04 SDOH — HEALTH STABILITY: PHYSICAL HEALTH: ON AVERAGE, HOW MANY MINUTES DO YOU ENGAGE IN EXERCISE AT THIS LEVEL?: 20 MIN

## 2024-04-04 SDOH — HEALTH STABILITY: PHYSICAL HEALTH: ON AVERAGE, HOW MANY DAYS PER WEEK DO YOU ENGAGE IN MODERATE TO STRENUOUS EXERCISE (LIKE A BRISK WALK)?: 3 DAYS

## 2024-04-04 ASSESSMENT — SOCIAL DETERMINANTS OF HEALTH (SDOH): HOW OFTEN DO YOU GET TOGETHER WITH FRIENDS OR RELATIVES?: THREE TIMES A WEEK

## 2024-04-10 ENCOUNTER — OFFICE VISIT (OUTPATIENT)
Dept: FAMILY MEDICINE | Facility: CLINIC | Age: 69
End: 2024-04-10
Payer: COMMERCIAL

## 2024-04-10 VITALS
HEART RATE: 68 BPM | DIASTOLIC BLOOD PRESSURE: 68 MMHG | WEIGHT: 234 LBS | SYSTOLIC BLOOD PRESSURE: 124 MMHG | TEMPERATURE: 98.3 F | RESPIRATION RATE: 24 BRPM | OXYGEN SATURATION: 93 % | BODY MASS INDEX: 33.5 KG/M2 | HEIGHT: 70 IN

## 2024-04-10 DIAGNOSIS — R73.01 IMPAIRED FASTING GLUCOSE: ICD-10-CM

## 2024-04-10 DIAGNOSIS — B35.1 TINEA UNGUIUM: ICD-10-CM

## 2024-04-10 DIAGNOSIS — E78.2 MIXED HYPERLIPIDEMIA: ICD-10-CM

## 2024-04-10 DIAGNOSIS — F03.90 MAJOR NEUROCOGNITIVE DISORDER (H): ICD-10-CM

## 2024-04-10 DIAGNOSIS — Z00.00 ENCOUNTER FOR MEDICARE ANNUAL WELLNESS EXAM: Primary | ICD-10-CM

## 2024-04-10 DIAGNOSIS — I10 BENIGN ESSENTIAL HYPERTENSION: ICD-10-CM

## 2024-04-10 DIAGNOSIS — M48.02 SPINAL STENOSIS IN CERVICAL REGION: ICD-10-CM

## 2024-04-10 DIAGNOSIS — M47.812 FACET ARTHROPATHY, CERVICAL: ICD-10-CM

## 2024-04-10 PROCEDURE — G0438 PPPS, INITIAL VISIT: HCPCS | Performed by: FAMILY MEDICINE

## 2024-04-10 PROCEDURE — 99214 OFFICE O/P EST MOD 30 MIN: CPT | Mod: 25 | Performed by: FAMILY MEDICINE

## 2024-04-10 RX ORDER — GABAPENTIN 100 MG/1
100 CAPSULE ORAL
Qty: 90 CAPSULE | Refills: 3 | Status: SHIPPED | OUTPATIENT
Start: 2024-04-10 | End: 2024-05-26

## 2024-04-10 RX ORDER — CYCLOBENZAPRINE HCL 10 MG
5-10 TABLET ORAL
Qty: 20 TABLET | Refills: 0 | Status: SHIPPED | OUTPATIENT
Start: 2024-04-10 | End: 2024-05-26

## 2024-04-10 RX ORDER — LISINOPRIL 5 MG/1
5 TABLET ORAL DAILY
Qty: 90 TABLET | Refills: 3 | Status: SHIPPED | OUTPATIENT
Start: 2024-04-10

## 2024-04-10 RX ORDER — ROSUVASTATIN CALCIUM 20 MG/1
20 TABLET, COATED ORAL DAILY
Qty: 90 TABLET | Refills: 3 | Status: SHIPPED | OUTPATIENT
Start: 2024-04-10

## 2024-04-10 ASSESSMENT — PAIN SCALES - GENERAL: PAINLEVEL: NO PAIN (0)

## 2024-04-10 NOTE — PATIENT INSTRUCTIONS
Be consistent with low salt, low trans fat and low saturated fat diet.  Eat food rich in omega-3-fatty acids as you tolerate. (salmon, olive oil)  Eat 5 cups of vegetables, fruits and whole grains per day.  Limit starchy food (white rice, white bread, white pasta, white potatoes) to less than a cup per meal.  Minimize sweets, junk food and fastfood. Limit soda beverages to one serving per day; best to avoid it altogether though.    Exercise as tolerated.    If liver enzymes are normal, terbinafine will be started for the toenail fungal infection. Liver enzymes to be rechecked a month after you start the med.  Referral to derm if not improving.    Follow up with neurology and discuss the twitching episodes.  Keep a routine at home.    Preventative Care Visits include: Yearly physicals, Well-child visits, Welcome to Medicare visits, & Medicare yearly wellness exams.    The purpose of these visits is to discuss your medical history and prevent health problems before you are sick.  You may need to pay a copay, coinsurance or deductible if your visit today includes testing or treating for a new or existing condition.    Additional charges may be incurred for today's visit. If you have questions about what your insurance plan covers, please contact your Insurance Company's member service department.  If you have questions specific to a bill you have already received from [facility], please contact the [facility] Billing Office at [phone].      Preventive Care Advice   This is general advice given by our system to help you stay healthy. However, your care team may have specific advice just for you. Please talk to your care team about your preventive care needs.  Nutrition  Eat 5 or more servings of fruits and vegetables each day.  Try wheat bread, brown rice and whole grain pasta (instead of white bread, rice, and pasta).  Get enough calcium and vitamin D. Check the label on foods and aim for 100% of the RDA (recommended  daily allowance).  Lifestyle  Exercise at least 150 minutes each week   (30 minutes a day, 5 days a week).  Do muscle strengthening activities 2 days a week. These help control your weight and prevent disease.  No smoking.  Wear sunscreen to prevent skin cancer.  Have a dental exam and cleaning every 6 months.  Yearly exams  See your health care team every year to talk about:  Any changes in your health.  Any medicines your care team has prescribed.  Preventive care, family planning, and ways to prevent chronic diseases.  Shots (vaccines)   HPV shots (up to age 26), if you've never had them before.  Hepatitis B shots (up to age 59), if you've never had them before.  COVID-19 shot: Get this shot when it's due.  Flu shot: Get a flu shot every year.  Tetanus shot: Get a tetanus shot every 10 years.  Pneumococcal, hepatitis A, and RSV shots: Ask your care team if you need these based on your risk.  Shingles shot (for age 50 and up).  General health tests  Diabetes screening:  Starting at age 35, Get screened for diabetes at least every 3 years.  If you are younger than age 35, ask your care team if you should be screened for diabetes.  Cholesterol test: At age 39, start having a cholesterol test every 5 years, or more often if advised.  Bone density scan (DEXA): At age 50, ask your care team if you should have this scan for osteoporosis (brittle bones).  Hepatitis C: Get tested at least once in your life.  STIs (sexually transmitted infections)  Before age 24: Ask your care team if you should be screened for STIs.  After age 24: Get screened for STIs if you're at risk. You are at risk for STIs (including HIV) if:  You are sexually active with more than one person.  You don't use condoms every time.  You or a partner was diagnosed with a sexually transmitted infection.  If you are at risk for HIV, ask about PrEP medicine to prevent HIV.  Get tested for HIV at least once in your life, whether you are at risk for HIV or  not.  Cancer screening tests  Cervical cancer screening: If you have a cervix, begin getting regular cervical cancer screening tests at age 21. Most people who have regular screenings with normal results can stop after age 65. Talk about this with your provider.  Breast cancer scan (mammogram): If you've ever had breasts, begin having regular mammograms starting at age 40. This is a scan to check for breast cancer.  Colon cancer screening: It is important to start screening for colon cancer at age 45.  Have a colonoscopy test every 10 years (or more often if you're at risk) Or, ask your provider about stool tests like a FIT test every year or Cologuard test every 3 years.  To learn more about your testing options, visit: https://[email or web address].pdf.  For help making a decision, visit: https://bit.[employer]/nn99248.  Prostate cancer screening test: If you have a prostate and are age 55 to 69, ask your provider if you would benefit from a yearly prostate cancer screening test.  Lung cancer screening: If you are a current or former smoker age 50 to 80, ask your care team if ongoing lung cancer screenings are right for you.  For informational purposes only. Not to replace the advice of your health care provider. Copyright   2023 [facility]. All rights reserved. Clinically reviewed by the [facility]. [employer] [id number] - REV 01/24.    Learning About Activities of Daily Living  What are activities of daily living?     Activities of daily living (ADLs) are the basic self-care tasks you do every day. These include eating, bathing, dressing, and moving around.  As you age, and if you have health problems, you may find that it's harder to do some of these tasks. If so, your doctor can suggest ideas that may help.  To measure what kind of help you may need, your doctor will ask how well you are able to do ADLs. Let your doctor know if there are any tasks that you are having trouble  doing. This is an important first step to getting help. And when you have the help you need, you can stay as independent as possible.  How will a doctor assess your ADLs?  Asking about ADLs is part of a routine health checkup your doctor will likely do as you age. Your health check might be done in a doctor's office, in your home, or at a hospital. The goal is to find out if you are having any problems that could make it hard to care for yourself or that make it unsafe for you to be on your own.  To measure your ADLs, your doctor will ask how hard it is for you to do routine tasks. Your doctor may also want to know if you have changed the way you do a task because of a health problem. Your doctor may watch how you:  Walk back and forth.  Keep your balance while you stand or walk.  Move from sitting to standing or from a bed to a chair.  Button or unbutton a shirt or sweater.  Remove and put on your shoes.  It's common to feel a little worried or anxious if you find you can't do all the things you used to be able to do. Talking with your doctor about ADLs is a way to make sure you're as safe as possible and able to care for yourself as well as you can. You may want to bring a caregiver, friend, or family member to your checkup. They can help you talk to your doctor.  Follow-up care is a key part of your treatment and safety. Be sure to make and go to all appointments, and call your doctor if you are having problems. It's also a good idea to know your test results and keep a list of the medicines you take.  Current as of: October 24, 2023               Content Version: 14.0    9099-3683 [employer].   Care instructions adapted under license by your healthcare professional. If you have questions about a medical condition or this instruction, always ask your healthcare professional. [employer] disclaims any warranty or liability for your use of this information.      Hearing Loss: Care  Instructions  Overview     Hearing loss is a sudden or slow decrease in how well you hear. It can range from slight to profound. Permanent hearing loss can occur with aging. It also can happen when you are exposed long-term to loud noise. Examples include listening to loud music, riding motorcycles, or being around other loud machines.  Hearing loss can affect your work and home life. It can make you feel lonely or depressed. You may feel that you have lost your independence. But hearing aids and other devices can help you hear better and feel connected to others.  Follow-up care is a key part of your treatment and safety. Be sure to make and go to all appointments, and call your doctor if you are having problems. It's also a good idea to know your test results and keep a list of the medicines you take.  How can you care for yourself at home?  Avoid loud noises whenever possible. This helps keep your hearing from getting worse.  Always wear hearing protection around loud noises.  Wear a hearing aid as directed.  A professional can help you pick a hearing aid that will work best for you.  You can also get hearing aids over the counter for mild to moderate hearing loss.  Have hearing tests as your doctor suggests. They can show whether your hearing has changed. Your hearing aid may need to be adjusted.  Use other devices as needed. These may include:  Telephone amplifiers and hearing aids that can connect to a television, stereo, radio, or microphone.  Devices that use lights or vibrations. These alert you to the doorbell, a ringing telephone, or a baby monitor.  Television closed-captioning. This shows the words at the bottom of the screen. Most new TVs can do this.  TTY (text telephone). This lets you type messages back and forth on the telephone instead of talking or listening. These devices are also called TDD. When messages are typed on the keyboard, they are sent over the phone line to a receiving TTY. The  "message is shown on a monitor.  Use text messaging, social media, and email if it is hard for you to communicate by telephone.  Try to learn a listening technique called speechreading. It is not lipreading. You pay attention to people's gestures, expressions, posture, and tone of voice. These clues can help you understand what a person is saying. Face the person you are talking to, and have them face you. Make sure the lighting is good. You need to see the other person's face clearly.  Think about counseling if you need help to adjust to your hearing loss.  When should you call for help?  Watch closely for changes in your health, and be sure to contact your doctor if:    You think your hearing is getting worse.     You have new symptoms, such as dizziness or nausea.   Where can you learn more?  Go to https://www.[employer].net/patiented  Enter R798 in the search box to learn more about \"Hearing Loss: Care Instructions.\"  Current as of: September 27, 2023               Content Version: 14.0    0025-7537 [employer].   Care instructions adapted under license by your healthcare professional. If you have questions about a medical condition or this instruction, always ask your healthcare professional. [employer] disclaims any warranty or liability for your use of this information.      Learning About Stress  What is stress?     Stress is your body's response to a hard situation. Your body can have a physical, emotional, or mental response. Stress is a fact of life for most people, and it affects everyone differently. What causes stress for you may not be stressful for someone else.  A lot of things can cause stress. You may feel stress when you go on a job interview, take a test, or run a race. This kind of short-term stress is normal and even useful. It can help you if you need to work hard or react quickly. For example, stress can help you finish an important job on time.  Long-term stress is " caused by ongoing stressful situations or events. Examples of long-term stress include long-term health problems, ongoing problems at work, or conflicts in your family. Long-term stress can harm your health.  How does stress affect your health?  When you are stressed, your body responds as though you are in danger. It makes hormones that speed up your heart, make you breathe faster, and give you a burst of energy. This is called the fight-or-flight stress response. If the stress is over quickly, your body goes back to normal and no harm is done.  But if stress happens too often or lasts too long, it can have bad effects. Long-term stress can make you more likely to get sick, and it can make symptoms of some diseases worse. If you tense up when you are stressed, you may develop neck, shoulder, or low back pain. Stress is linked to high blood pressure and heart disease.  Stress also harms your emotional health. It can make you rodriges, tense, or depressed. Your relationships may suffer, and you may not do well at work or school.  What can you do to manage stress?  You can try these things to help manage stress:   Do something active. Exercise or activity can help reduce stress. Walking is a great way to get started. Even everyday activities such as housecleaning or yard work can help.  Try yoga or kin chi. These techniques combine exercise and meditation. You may need some training at first to learn them.  Do something you enjoy. For example, listen to music or go to a movie. Practice your hobby or do volunteer work.  Meditate. This can help you relax, because you are not worrying about what happened before or what may happen in the future.  Do guided imagery. Imagine yourself in any setting that helps you feel calm. You can use online videos, books, or a teacher to guide you.  Do breathing exercises. For example:  From a standing position, bend forward from the waist with your knees slightly bent. Let your arms dangle  "close to the floor.  Breathe in slowly and deeply as you return to a standing position. Roll up slowly and lift your head last.  Hold your breath for just a few seconds in the standing position.  Breathe out slowly and bend forward from the waist.  Let your feelings out. Talk, laugh, cry, and express anger when you need to. Talking with supportive friends or family, a counselor, or a oksana leader about your feelings is a healthy way to relieve stress. Avoid discussing your feelings with people who make you feel worse.  Write. It may help to write about things that are bothering you. This helps you find out how much stress you feel and what is causing it. When you know this, you can find better ways to cope.  What can you do to prevent stress?  You might try some of these things to help prevent stress:  Manage your time. This helps you find time to do the things you want and need to do.  Get enough sleep. Your body recovers from the stresses of the day while you are sleeping.  Get support. Your family, friends, and community can make a difference in how you experience stress.  Limit your news feed. Avoid or limit time on social media or news that may make you feel stressed.  Do something active. Exercise or activity can help reduce stress. Walking is a great way to get started.  Where can you learn more?  Go to https://www.[employer].net/patiented  Enter N032 in the search box to learn more about \"Learning About Stress.\"  Current as of: October 24, 2023               Content Version: 14.0    1035-7668 "[employer].   Care instructions adapted under license by your healthcare professional. If you have questions about a medical condition or this instruction, always ask your healthcare professional. "[employer] disclaims any warranty or liability for your use of this information.      Learning About Sleeping Well  What does sleeping well mean?     Sleeping well means getting enough sleep to feel " good and stay healthy. How much sleep is enough varies among people.  The number of hours you sleep and how you feel when you wake up are both important. If you do not feel refreshed, you probably need more sleep. Another sign of not getting enough sleep is feeling tired during the day.  Experts recommend that adults get at least 7 or more hours of sleep per day. Children and older adults need more sleep.  Why is getting enough sleep important?  Getting enough quality sleep is a basic part of good health. When your sleep suffers, your physical health, mood, and your thoughts can suffer too. You may find yourself feeling more grumpy or stressed. Not getting enough sleep also can lead to serious problems, including injury, accidents, anxiety, and depression.  What might cause poor sleeping?  Many things can cause sleep problems, including:  Changes to your sleep schedule.  Stress. Stress can be caused by fear about a single event, such as giving a speech. Or you may have ongoing stress, such as worry about work or school.  Depression, anxiety, and other mental or emotional conditions.  Changes in your sleep habits or surroundings. This includes changes that happen where you sleep, such as noise, light, or sleeping in a different bed. It also includes changes in your sleep pattern, such as having jet lag or working a late shift.  Health problems, such as pain, breathing problems, and restless legs syndrome.  Lack of regular exercise.  Using alcohol, nicotine, or caffeine before bed.  How can you help yourself?  Here are some tips that may help you sleep more soundly and wake up feeling more refreshed.  Your sleeping area   Use your bedroom only for sleeping and sex. A bit of light reading may help you fall asleep. But if it doesn't, do your reading elsewhere in the house. Try not to use your TV, computer, smartphone, or tablet while you are in bed.  Be sure your bed is big enough to stretch out comfortably, especially  "if you have a sleep partner.  Keep your bedroom quiet, dark, and cool. Use curtains, blinds, or a sleep mask to block out light. To block out noise, use earplugs, soothing music, or a \"white noise\" machine.  Your evening and bedtime routine   Create a relaxing bedtime routine. You might want to take a warm shower or bath, or listen to soothing music.  Go to bed at the same time every night. And get up at the same time every morning, even if you feel tired.  What to avoid   Limit caffeine (coffee, tea, caffeinated sodas) during the day, and don't have any for at least 6 hours before bedtime.  Avoid drinking alcohol before bedtime. Alcohol can cause you to wake up more often during the night.  Try not to smoke or use tobacco, especially in the evening. Nicotine can keep you awake.  Limit naps during the day, especially close to bedtime.  Avoid lying in bed awake for too long. If you can't fall asleep or if you wake up in the middle of the night and can't get back to sleep within about 20 minutes, get out of bed and go to another room until you feel sleepy.  Avoid taking medicine right before bed that may keep you awake or make you feel hyper or energized. Your doctor can tell you if your medicine may do this and if you can take it earlier in the day.  If you can't sleep   Imagine yourself in a peaceful, pleasant scene. Focus on the details and feelings of being in a place that is relaxing.  Get up and do a quiet or boring activity until you feel sleepy.  Avoid drinking any liquids before going to bed to help prevent waking up often to use the bathroom.  Where can you learn more?  Go to https://www.[employer].com.net/patiented  Enter J942 in the search box to learn more about \"Learning About Sleeping Well.\"  Current as of: July 10, 2023               Content Version: 14.0    6383-6734 [employer].   Care instructions adapted under license by your healthcare professional. If you have questions about a medical " condition or this instruction, always ask your healthcare professional. [employer] disclaims any warranty or liability for your use of this information.

## 2024-04-10 NOTE — PROGRESS NOTES
Preventive Care Visit  [facility]  Rafael Velasco MD, Family Medicine  Apr 10, 2024      Assessment & Plan     Encounter for Medicare annual wellness exam  Patient was advised on recommended screening and preventive health recommendations.  He verbalized understanding and agreed to the plans below.     Facet arthropathy, cervical  Stable pain control overall per patient and spouse.  Patient rarely uses muscle relaxant. Reinforced safe use.  - cyclobenzaprine (FLEXERIL) 10 MG tablet  Dispense: 20 tablet; Refill: 0  - gabapentin (NEURONTIN) 100 MG capsule  Dispense: 90 capsule; Refill: 3  - OFFICE/OUTPT VISIT,EST,LEVL IV    Spinal stenosis in cervical region  Stable pain control.  - gabapentin (NEURONTIN) 100 MG capsule  Dispense: 90 capsule; Refill: 3  - OFFICE/OUTPT VISIT,EST,LEVL IV    Benign essential hypertension  Controlled.  Low salt, low fat diet.   Exercise as tolerated.  Take meds as prescribed; call if with side effects.    - lisinopril (ZESTRIL) 5 MG tablet  Dispense: 90 tablet; Refill: 3  - Comprehensive metabolic panel  - OFFICE/OUTPT VISIT,EST,LEVL IV    Mixed hyperlipidemia  Reinforced heart healthy lifestyle.   - rosuvastatin (CRESTOR) 20 MG tablet  Dispense: 90 tablet; Refill: 3  - Comprehensive metabolic panel  - OFFICE/OUTPT VISIT,EST,LEVL IV    Major neurocognitive disorder (H)  Gradually progressing per patient and spouse but patient still has most of his cognitive functions.  Does struggle with the changes with their recent house move but they are working to establish a routine.  Sees neurology every 6 months.    Tinea unguium  Appearance of toenails very consistent with fungal toenail infection.  Check liver enzymes and fi normal start terbinafine.  Discussed course of treatment and surveillance of liver function.  Return precautions discussed and given to patient.   - Comprehensive metabolic panel  - OFFICE/OUTPT VISIT,EST,LEVL IV      Patient has been advised  "of split billing requirements and indicates understanding: Yes      BMI  Estimated body mass index is 34.06 kg/m  as calculated from the following:    Height as of this encounter: 1.765 m (5' 9.5\").    Weight as of this encounter: 106.1 kg (234 lb).   Weight management plan: Discussed healthy diet and exercise guidelines    Counseling  Appropriate preventive services were discussed with this patient, including applicable screening as appropriate for fall prevention, nutrition, physical activity, Tobacco-use cessation, weight loss and cognition.  Checklist reviewing preventive services available has been given to the patient.  Reviewed patient's diet, addressing concerns and/or questions.   He is at risk for lack of exercise and has been provided with information to increase physical activity for the benefit of his well-being.   Discussed possible causes of fatigue. Updated plan of care.  Patient reported difficulty with activities of daily living were addressed today.The patient was provided with written information regarding signs of hearing loss.       Patient Instructions   Be consistent with low salt, low trans fat and low saturated fat diet.  Eat food rich in omega-3-fatty acids as you tolerate. (salmon, olive oil)  Eat 5 cups of vegetables, fruits and whole grains per day.  Limit starchy food (white rice, white bread, white pasta, white potatoes) to less than a cup per meal.  Minimize sweets, junk food and fastfood. Limit soda beverages to one serving per day; best to avoid it altogether though.    Exercise as tolerated.    If liver enzymes are normal, terbinafine will be started for the toenail fungal infection. Liver enzymes to be rechecked a month after you start the med.  Referral to derm if not improving.    Follow up with neurology and discuss the twitching episodes.  Keep a routine at home.    Preventative Care Visits include: Yearly physicals, Well-child visits, Welcome to Medicare visits, & Medicare " yearly wellness exams.    The purpose of these visits is to discuss your medical history and prevent health problems before you are sick.  You may need to pay a copay, coinsurance or deductible if your visit today includes testing or treating for a new or existing condition.    Additional charges may be incurred for today's visit. If you have questions about what your insurance plan covers, please contact your Insurance Company's member service department.  If you have questions specific to a bill you have already received from [facility], please contact the [facility] Billing Office at [phone].      Preventive Care Advice   This is general advice given by our system to help you stay healthy. However, your care team may have specific advice just for you. Please talk to your care team about your preventive care needs.  Nutrition  Eat 5 or more servings of fruits and vegetables each day.  Try wheat bread, brown rice and whole grain pasta (instead of white bread, rice, and pasta).  Get enough calcium and vitamin D. Check the label on foods and aim for 100% of the RDA (recommended daily allowance).  Lifestyle  Exercise at least 150 minutes each week   (30 minutes a day, 5 days a week).  Do muscle strengthening activities 2 days a week. These help control your weight and prevent disease.  No smoking.  Wear sunscreen to prevent skin cancer.  Have a dental exam and cleaning every 6 months.  Yearly exams  See your health care team every year to talk about:  Any changes in your health.  Any medicines your care team has prescribed.  Preventive care, family planning, and ways to prevent chronic diseases.  Shots (vaccines)   HPV shots (up to age 26), if you've never had them before.  Hepatitis B shots (up to age 59), if you've never had them before.  COVID-19 shot: Get this shot when it's due.  Flu shot: Get a flu shot every year.  Tetanus shot: Get a tetanus shot every 10 years.  Pneumococcal, hepatitis A, and RSV shots: Ask  your care team if you need these based on your risk.  Shingles shot (for age 50 and up).  General health tests  Diabetes screening:  Starting at age 35, Get screened for diabetes at least every 3 years.  If you are younger than age 35, ask your care team if you should be screened for diabetes.  Cholesterol test: At age 39, start having a cholesterol test every 5 years, or more often if advised.  Bone density scan (DEXA): At age 50, ask your care team if you should have this scan for osteoporosis (brittle bones).  Hepatitis C: Get tested at least once in your life.  STIs (sexually transmitted infections)  Before age 24: Ask your care team if you should be screened for STIs.  After age 24: Get screened for STIs if you're at risk. You are at risk for STIs (including HIV) if:  You are sexually active with more than one person.  You don't use condoms every time.  You or a partner was diagnosed with a sexually transmitted infection.  If you are at risk for HIV, ask about PrEP medicine to prevent HIV.  Get tested for HIV at least once in your life, whether you are at risk for HIV or not.  Cancer screening tests  Cervical cancer screening: If you have a cervix, begin getting regular cervical cancer screening tests at age 21. Most people who have regular screenings with normal results can stop after age 65. Talk about this with your provider.  Breast cancer scan (mammogram): If you've ever had breasts, begin having regular mammograms starting at age 40. This is a scan to check for breast cancer.  Colon cancer screening: It is important to start screening for colon cancer at age 45.  Have a colonoscopy test every 10 years (or more often if you're at risk) Or, ask your provider about stool tests like a FIT test every year or Cologuard test every 3 years.  To learn more about your testing options, visit: https://[email or web address]/957069.pdf.  For help making a decision, visit: https://bit.ly/aj02896.  Prostate cancer screening  test: If you have a prostate and are age 55 to 69, ask your provider if you would benefit from a yearly prostate cancer screening test.  Lung cancer screening: If you are a current or former smoker age 50 to 80, ask your care team if ongoing lung cancer screenings are right for you.  For informational purposes only. Not to replace the advice of your health care provider. Copyright   2023 [facility]. All rights reserved. Clinically reviewed by the [facility] Transitions Program. [employer] 574827 - REV 01/24.    Learning About Activities of Daily Living  What are activities of daily living?     Activities of daily living (ADLs) are the basic self-care tasks you do every day. These include eating, bathing, dressing, and moving around.  As you age, and if you have health problems, you may find that it's harder to do some of these tasks. If so, your doctor can suggest ideas that may help.  To measure what kind of help you may need, your doctor will ask how well you are able to do ADLs. Let your doctor know if there are any tasks that you are having trouble doing. This is an important first step to getting help. And when you have the help you need, you can stay as independent as possible.  How will a doctor assess your ADLs?  Asking about ADLs is part of a routine health checkup your doctor will likely do as you age. Your health check might be done in a doctor's office, in your home, or at a hospital. The goal is to find out if you are having any problems that could make it hard to care for yourself or that make it unsafe for you to be on your own.  To measure your ADLs, your doctor will ask how hard it is for you to do routine tasks. Your doctor may also want to know if you have changed the way you do a task because of a health problem. Your doctor may watch how you:  Walk back and forth.  Keep your balance while you stand or walk.  Move from sitting to standing or from a bed to a chair.  Button or  unbutton a shirt or sweater.  Remove and put on your shoes.  It's common to feel a little worried or anxious if you find you can't do all the things you used to be able to do. Talking with your doctor about ADLs is a way to make sure you're as safe as possible and able to care for yourself as well as you can. You may want to bring a caregiver, friend, or family member to your checkup. They can help you talk to your doctor.  Follow-up care is a key part of your treatment and safety. Be sure to make and go to all appointments, and call your doctor if you are having problems. It's also a good idea to know your test results and keep a list of the medicines you take.  Current as of: October 24, 2023               Content Version: 14.0    4273-7627 [employer].   Care instructions adapted under license by your healthcare professional. If you have questions about a medical condition or this instruction, always ask your healthcare professional. [employer] disclaims any warranty or liability for your use of this information.      Hearing Loss: Care Instructions  Overview     Hearing loss is a sudden or slow decrease in how well you hear. It can range from slight to profound. Permanent hearing loss can occur with aging. It also can happen when you are exposed long-term to loud noise. Examples include listening to loud music, riding motorcycles, or being around other loud machines.  Hearing loss can affect your work and home life. It can make you feel lonely or depressed. You may feel that you have lost your independence. But hearing aids and other devices can help you hear better and feel connected to others.  Follow-up care is a key part of your treatment and safety. Be sure to make and go to all appointments, and call your doctor if you are having problems. It's also a good idea to know your test results and keep a list of the medicines you take.  How can you care for yourself at home?  Avoid loud  noises whenever possible. This helps keep your hearing from getting worse.  Always wear hearing protection around loud noises.  Wear a hearing aid as directed.  A professional can help you pick a hearing aid that will work best for you.  You can also get hearing aids over the counter for mild to moderate hearing loss.  Have hearing tests as your doctor suggests. They can show whether your hearing has changed. Your hearing aid may need to be adjusted.  Use other devices as needed. These may include:  Telephone amplifiers and hearing aids that can connect to a television, stereo, radio, or microphone.  Devices that use lights or vibrations. These alert you to the doorbell, a ringing telephone, or a baby monitor.  Television closed-captioning. This shows the words at the bottom of the screen. Most new TVs can do this.  TTY (text telephone). This lets you type messages back and forth on the telephone instead of talking or listening. These devices are also called TDD. When messages are typed on the keyboard, they are sent over the phone line to a receiving TTY. The message is shown on a monitor.  Use text messaging, social media, and email if it is hard for you to communicate by telephone.  Try to learn a listening technique called speechreading. It is not lipreading. You pay attention to people's gestures, expressions, posture, and tone of voice. These clues can help you understand what a person is saying. Face the person you are talking to, and have them face you. Make sure the lighting is good. You need to see the other person's face clearly.  Think about counseling if you need help to adjust to your hearing loss.  When should you call for help?  Watch closely for changes in your health, and be sure to contact your doctor if:    You think your hearing is getting worse.     You have new symptoms, such as dizziness or nausea.   Where can you learn more?  Go to https://www.healthwise.net/patiented  Enter R794 in the  "search box to learn more about \"Hearing Loss: Care Instructions.\"  Current as of: September 27, 2023               Content Version: 14.0    2366-8881 [employer].   Care instructions adapted under license by your healthcare professional. If you have questions about a medical condition or this instruction, always ask your healthcare professional. [employer] disclaims any warranty or liability for your use of this information.      Learning About Stress  What is stress?     Stress is your body's response to a hard situation. Your body can have a physical, emotional, or mental response. Stress is a fact of life for most people, and it affects everyone differently. What causes stress for you may not be stressful for someone else.  A lot of things can cause stress. You may feel stress when you go on a job interview, take a test, or run a race. This kind of short-term stress is normal and even useful. It can help you if you need to work hard or react quickly. For example, stress can help you finish an important job on time.  Long-term stress is caused by ongoing stressful situations or events. Examples of long-term stress include long-term health problems, ongoing problems at work, or conflicts in your family. Long-term stress can harm your health.  How does stress affect your health?  When you are stressed, your body responds as though you are in danger. It makes hormones that speed up your heart, make you breathe faster, and give you a burst of energy. This is called the fight-or-flight stress response. If the stress is over quickly, your body goes back to normal and no harm is done.  But if stress happens too often or lasts too long, it can have bad effects. Long-term stress can make you more likely to get sick, and it can make symptoms of some diseases worse. If you tense up when you are stressed, you may develop neck, shoulder, or low back pain. Stress is linked to high blood pressure and " heart disease.  Stress also harms your emotional health. It can make you rodriges, tense, or depressed. Your relationships may suffer, and you may not do well at work or school.  What can you do to manage stress?  You can try these things to help manage stress:   Do something active. Exercise or activity can help reduce stress. Walking is a great way to get started. Even everyday activities such as housecleaning or yard work can help.  Try yoga or kin chi. These techniques combine exercise and meditation. You may need some training at first to learn them.  Do something you enjoy. For example, listen to music or go to a movie. Practice your hobby or do volunteer work.  Meditate. This can help you relax, because you are not worrying about what happened before or what may happen in the future.  Do guided imagery. Imagine yourself in any setting that helps you feel calm. You can use online videos, books, or a teacher to guide you.  Do breathing exercises. For example:  From a standing position, bend forward from the waist with your knees slightly bent. Let your arms dangle close to the floor.  Breathe in slowly and deeply as you return to a standing position. Roll up slowly and lift your head last.  Hold your breath for just a few seconds in the standing position.  Breathe out slowly and bend forward from the waist.  Let your feelings out. Talk, laugh, cry, and express anger when you need to. Talking with supportive friends or family, a counselor, or a oksana leader about your feelings is a healthy way to relieve stress. Avoid discussing your feelings with people who make you feel worse.  Write. It may help to write about things that are bothering you. This helps you find out how much stress you feel and what is causing it. When you know this, you can find better ways to cope.  What can you do to prevent stress?  You might try some of these things to help prevent stress:  Manage your time. This helps you find time to do the  "things you want and need to do.  Get enough sleep. Your body recovers from the stresses of the day while you are sleeping.  Get support. Your family, friends, and community can make a difference in how you experience stress.  Limit your news feed. Avoid or limit time on social media or news that may make you feel stressed.  Do something active. Exercise or activity can help reduce stress. Walking is a great way to get started.  Where can you learn more?  Go to https://www.[employer].net/patiented  Enter N032 in the search box to learn more about \"Learning About Stress.\"  Current as of: October 24, 2023               Content Version: 14.0    2527-1457 [employer].   Care instructions adapted under license by your healthcare professional. If you have questions about a medical condition or this instruction, always ask your healthcare professional. [employer] disclaims any warranty or liability for your use of this information.      Learning About Sleeping Well  What does sleeping well mean?     Sleeping well means getting enough sleep to feel good and stay healthy. How much sleep is enough varies among people.  The number of hours you sleep and how you feel when you wake up are both important. If you do not feel refreshed, you probably need more sleep. Another sign of not getting enough sleep is feeling tired during the day.  Experts recommend that adults get at least 7 or more hours of sleep per day. Children and older adults need more sleep.  Why is getting enough sleep important?  Getting enough quality sleep is a basic part of good health. When your sleep suffers, your physical health, mood, and your thoughts can suffer too. You may find yourself feeling more grumpy or stressed. Not getting enough sleep also can lead to serious problems, including injury, accidents, anxiety, and depression.  What might cause poor sleeping?  Many things can cause sleep problems, including:  Changes to your " "sleep schedule.  Stress. Stress can be caused by fear about a single event, such as giving a speech. Or you may have ongoing stress, such as worry about work or school.  Depression, anxiety, and other mental or emotional conditions.  Changes in your sleep habits or surroundings. This includes changes that happen where you sleep, such as noise, light, or sleeping in a different bed. It also includes changes in your sleep pattern, such as having jet lag or working a late shift.  Health problems, such as pain, breathing problems, and restless legs syndrome.  Lack of regular exercise.  Using alcohol, nicotine, or caffeine before bed.  How can you help yourself?  Here are some tips that may help you sleep more soundly and wake up feeling more refreshed.  Your sleeping area   Use your bedroom only for sleeping and sex. A bit of light reading may help you fall asleep. But if it doesn't, do your reading elsewhere in the house. Try not to use your TV, computer, smartphone, or tablet while you are in bed.  Be sure your bed is big enough to stretch out comfortably, especially if you have a sleep partner.  Keep your bedroom quiet, dark, and cool. Use curtains, blinds, or a sleep mask to block out light. To block out noise, use earplugs, soothing music, or a \"white noise\" machine.  Your evening and bedtime routine   Create a relaxing bedtime routine. You might want to take a warm shower or bath, or listen to soothing music.  Go to bed at the same time every night. And get up at the same time every morning, even if you feel tired.  What to avoid   Limit caffeine (coffee, tea, caffeinated sodas) during the day, and don't have any for at least 6 hours before bedtime.  Avoid drinking alcohol before bedtime. Alcohol can cause you to wake up more often during the night.  Try not to smoke or use tobacco, especially in the evening. Nicotine can keep you awake.  Limit naps during the day, especially close to bedtime.  Avoid lying in bed " "awake for too long. If you can't fall asleep or if you wake up in the middle of the night and can't get back to sleep within about 20 minutes, get out of bed and go to another room until you feel sleepy.  Avoid taking medicine right before bed that may keep you awake or make you feel hyper or energized. Your doctor can tell you if your medicine may do this and if you can take it earlier in the day.  If you can't sleep   Imagine yourself in a peaceful, pleasant scene. Focus on the details and feelings of being in a place that is relaxing.  Get up and do a quiet or boring activity until you feel sleepy.  Avoid drinking any liquids before going to bed to help prevent waking up often to use the bathroom.  Where can you learn more?  Go to https://www.[employer]net/patiented  Enter J942 in the search box to learn more about \"Learning About Sleeping Well.\"  Current as of: July 10, 2023               Content Version: 14.0    5200-3490 [employer].   Care instructions adapted under license by your healthcare professional. If you have questions about a medical condition or this instruction, always ask your healthcare professional. [employer] disclaims any warranty or liability for your use of this information.        Meche Bauer is a 68 year old, presenting for the following:  Physical (Wellness visit)        4/10/2024     3:30 PM   Additional Questions   Roomed by Patricia WELCH   Accompanied by wife, Cassie        Health Care Directive  Patient does not have a Health Care Directive or Living Will: Discussed advance care planning with patient; information given to patient to review.    HPI      Hyperlipidemia Follow-Up    Are you regularly taking any medication or supplement to lower your cholesterol?   Yes- rosuvastatin  Are you having muscle aches or other side effects that you think could be caused by your cholesterol lowering medication?  No    Hypertension Follow-up    Do you check your blood " pressure regularly outside of the clinic? No   Are you following a low salt diet? Yes  Are your blood pressures ever more than 140 on the top number (systolic) OR more   than 90 on the bottom number (diastolic), for example 140/90?     Vascular Disease Follow-up    How often do you take nitroglycerin? Never  Do you take an aspirin every day? Yes    Medication Followup of Gabapentin  Taking Medication as prescribed: yes  Side Effects:  None  Medication Helping Symptoms:  yes      4/4/2024   General Health   How would you rate your overall physical health? Good   Feel stress (tense, anxious, or unable to sleep) To some extent   (!) STRESS CONCERN      4/4/2024   Nutrition   Diet: Regular (no restrictions)         4/4/2024   Exercise   Days per week of moderate/strenous exercise 3 days   Average minutes spent exercising at this level 20 min         4/4/2024   Social Factors   Frequency of gathering with friends or relatives Three times a week   Worry food won't last until get money to buy more No   Food not last or not have enough money for food? No   Do you have housing?  Yes   Are you worried about losing your housing? No   Lack of transportation? No   Unable to get utilities (heat,electricity)? No         4/9/2024   Fall Risk   Fallen 2 or more times in the past year? No   Trouble with walking or balance? No          4/4/2024   Activities of Daily Living- Home Safety   Needs help with the following daily activites Telephone use    Transportation    Shopping    Preparing meals    Money management   Safety concerns in the home None of the above         4/4/2024   Dental   Dentist two times every year? Yes         4/4/2024   Hearing Screening   Hearing concerns? (!) I FEEL THAT PEOPLE ARE MUMBLING OR NOT SPEAKING CLEARLY.    (!) I NEED TO ASK PEOPLE TO SPEAK UP OR REPEAT THEMSELVES.    (!) IT'S HARD TO FOLLOW A CONVERSATION IN A NOISY RESTAURANT OR CROWDED ROOM.    (!) TROUBLE UNDESTANDING A SPEAKER IN A PUBLIC MEETING  OR [facility] [employer]ICE.    (!) TROUBLE UNDERSTANDING SOFT OR WHISPERED SPEECH.    (!) TROUBLE UNDERSTANDING SPEECH ON THE TELEPHONE         4/4/2024   Driving Risk Screening   Patient/family members have concerns about driving (!) DECLINE         4/4/2024   General Alertness/Fatigue Screening   Have you been more tired than usual lately? (!) YES         4/4/2024   Urinary Incontinence Screening   Bothered by leaking urine in past 6 months No         4/4/2024   TB Screening   Were you born outside of the [place]? No         Today's PHQ-2 Score:       4/9/2024     9:31 PM   PHQ-2 ( 1999 [employer])   Q1: Little interest or pleasure in doing things 0   Q2: Feeling down, depressed or hopeless 0   PHQ-2 Score 0   Q1: Little interest or pleasure in doing things Not at all   Q2: Feeling down, depressed or hopeless Not at all   PHQ-2 Score 0           4/4/2024   Substance Use   Alcohol more than 3/day or more than 7/wk No   Do you have a current opioid prescription? No   How severe/bad is pain from 1 to 10? 0/10 (No Pain)   Do you use any other substances recreationally? No     Social History     Tobacco Use     Smoking status: Never     Smokeless tobacco: Never   Vaping Use     Vaping status: Never Used   Substance Use Topics     Alcohol use: Yes     Comment: Very seldom     Drug use: No           4/4/2024   AAA Screening   Family history of Abdominal Aortic Aneurysm (AAA)? No   Last PSA:   PSA   Date Value Ref Range Status   08/27/2019 0.33 0 - 4 ug/L Final     Comment:     Assay Method:  Chemiluminescence using Siemens Vista analyzer     ASCVD Risk   The 10-year ASCVD risk score (Christopher VAZQUEZ, et al., 2019) is: 13.2%    Values used to calculate the score:      Age: 68 years      Sex: Male      Is Non- : No      Diabetic: No      Tobacco smoker: No      Systolic Blood Pressure: 124 mmHg      Is BP treated: Yes      HDL Cholesterol: 46 mg/dL      Total Cholesterol: 151 mg/dL            Reviewed and  updated as needed this visit by Provider     Meds                Past Medical History:   Diagnosis Date     Heart disease     stent 2011     Hyperlipidemia LDL goal < 130      Hypertension      Past Surgical History:   Procedure Laterality Date     CARDIAC SURGERY      1 stentplaced     COLONOSCOPY N/A 2/23/2018    Procedure: COLONOSCOPY;  colonoscopy;  Surgeon: Daniel Caldera MD;  Location: [facility]     Patient Active Problem List   Diagnosis     Mixed hyperlipidemia     Benign essential hypertension     Coronary artery disease involving native coronary artery of native heart without angina pectoris     Advanced directives, counseling/discussion     Impacted cerumen of both ears     Non morbid obesity, unspecified obesity type     Vaccine refused by patient     Major neurocognitive disorder (H)     Past Surgical History:   Procedure Laterality Date     CARDIAC SURGERY      1 stentplaced     COLONOSCOPY N/A 2/23/2018    Procedure: COLONOSCOPY;  colonoscopy;  Surgeon: Daniel Caldera MD;  Location: [facility]       Social History     Tobacco Use     Smoking status: Never     Smokeless tobacco: Never   Substance Use Topics     Alcohol use: Yes     Comment: Very seldom     Family History   Problem Relation Age of Onset     Cancer Mother         brain tumor     C.A.D. Mother      Alcohol/Drug Mother         smoker     Diabetes Mother      Cancer Father         hodgkins     Alcohol/Drug Father         smoker     Other Cancer Father      Diabetes Father      Hypertension Brother          Current Outpatient Medications   Medication Sig Dispense Refill     aspirin 81 MG tablet Take 1 tablet by mouth daily.       cyclobenzaprine (FLEXERIL) 10 MG tablet Take 0.5-1 tablets (5-10 mg) by mouth nightly as needed for muscle spasms 20 tablet 0     gabapentin (NEURONTIN) 100 MG capsule Take 1 capsule (100 mg) by mouth nightly as needed for neuropathic pain 90 capsule 3     lisinopril (ZESTRIL) 5 MG tablet Take 1 tablet (5 mg) by mouth  "daily 90 tablet 3     metoprolol tartrate (LOPRESSOR) 50 MG tablet TAKE 1 TABLET BY MOUTH 2 TIMES DAILY 180 tablet 1     rosuvastatin (CRESTOR) 20 MG tablet Take 1 tablet (20 mg) by mouth daily 90 tablet 3     triamterene-HCTZ (MAXZIDE-25) 37.5-25 MG tablet TAKE ONE TABLET BY MOUTH ONCE DAILY 90 tablet 3     No Known Allergies  Current providers sharing in care for this patient include:  Patient Care Team:  Rafael Velasco MD as PCP - General (Family Practice)  Rafael Velasco MD as Assigned PCP  Dena Elias, PhD as Assigned Behavioral Health Provider    The following health maintenance items are reviewed in Epic and correct as of today:  Health Maintenance   Topic Date Due     RSV VACCINE (Pregnancy & 60+) (1 - 1-dose 60+ series) Never done     LIPID  12/23/2023     MEDICARE ANNUAL WELLNESS VISIT  04/10/2025     ANNUAL REVIEW OF HM ORDERS  04/10/2025     FALL RISK ASSESSMENT  04/10/2025     GLUCOSE  04/19/2026     ADVANCE CARE PLANNING  12/14/2027     COLORECTAL CANCER SCREENING  02/23/2028     DTAP/TDAP/TD IMMUNIZATION (3 - Td or Tdap) 06/09/2028     HEPATITIS C SCREENING  Completed     PHQ-2 (once per calendar year)  Completed     IPV IMMUNIZATION  Aged Out     HPV IMMUNIZATION  Aged Out     MENINGITIS IMMUNIZATION  Aged Out     RSV MONOCLONAL ANTIBODY  Aged Out     INFLUENZA VACCINE  Discontinued     Pneumococcal Vaccine: 65+ Years  Discontinued     ZOSTER IMMUNIZATION  Discontinued     COVID-19 Vaccine  Discontinued         Review of Systems  Constitutional, HEENT, cardiovascular, pulmonary, GI, , musculoskeletal, neuro, skin, endocrine and psych systems are negative, except as otherwise noted.     Objective    Exam  /68 (BP Location: Right arm, Patient Position: Chair, Cuff Size: Adult Large)   Pulse 68   Temp 98.3  F (36.8  C) (Tympanic)   Resp 24   Ht 1.765 m (5' 9.5\")   Wt 106.1 kg (234 lb)   SpO2 93%   BMI 34.06 kg/m     Estimated body mass index is 34.06 kg/m  " "as calculated from the following:    Height as of this encounter: 1.765 m (5' 9.5\").    Weight as of this encounter: 106.1 kg (234 lb).    Physical Exam  GENERAL APPEARANCE: obese,  alert and no distress, ambulatory w/o assist  EYES: pink conj, no icterus, PERRL, EOMI  HENT: ear canals and TM's normal, nose and mouth without ulcers or lesions, oropharynx clear and oral mucous membranes moist  NECK: no adenopathy, no asymmetry, masses, or scars and thyroid normal to palpation  RESP: lungs clear to auscultation - no rales, rhonchi or wheezes  CV: regular rates and rhythm, normal S1 S2, no S3 or S4, no murmur, click or rub, no peripheral edema and peripheral pulses strong  ABDOMEN: protuberant, soft, nontender, no hepatosplenomegaly, no masses and bowel sounds normal  RECTAL:  deferred  MS: no musculoskeletal defects are noted and gait is age appropriate without ataxia  SKIN: no suspicious lesions or rashes  TOENAILS: dirty yellow discoloration of all nails with distal edge with irregular erosions; no paronychial induration in any toe; no foot skin lesions  NEURO: Normal strength and tone, sensory exam grossly normal, mentation intact and speech normal   MENTATION: answers and converses appropriately; struggled with words only twice in the encounter; appropriate affect          4/10/2024   Mini Cog   Mini-Cog Not Completed (choose reason) Known dementia         Vision Screen         Signed Electronically by: Rafael Velasco MD    "

## 2024-04-18 ENCOUNTER — LAB (OUTPATIENT)
Dept: LAB | Facility: CLINIC | Age: 69
End: 2024-04-18
Payer: COMMERCIAL

## 2024-04-18 DIAGNOSIS — B35.1 TINEA UNGUIUM: ICD-10-CM

## 2024-04-18 DIAGNOSIS — I10 BENIGN ESSENTIAL HYPERTENSION: ICD-10-CM

## 2024-04-18 DIAGNOSIS — E78.2 MIXED HYPERLIPIDEMIA: ICD-10-CM

## 2024-04-18 LAB
ALBUMIN SERPL BCG-MCNC: 4.2 G/DL (ref 3.5–5.2)
ALP SERPL-CCNC: 73 U/L (ref 40–150)
ALT SERPL W P-5'-P-CCNC: 23 U/L (ref 0–70)
ANION GAP SERPL CALCULATED.3IONS-SCNC: 7 MMOL/L (ref 7–15)
AST SERPL W P-5'-P-CCNC: 25 U/L (ref 0–45)
BILIRUB SERPL-MCNC: 1 MG/DL
BUN SERPL-MCNC: 19.3 MG/DL (ref 8–23)
CALCIUM SERPL-MCNC: 9.3 MG/DL (ref 8.8–10.2)
CHLORIDE SERPL-SCNC: 101 MMOL/L (ref 98–107)
CHOLEST SERPL-MCNC: 147 MG/DL
CREAT SERPL-MCNC: 0.99 MG/DL (ref 0.67–1.17)
DEPRECATED HCO3 PLAS-SCNC: 31 MMOL/L (ref 22–29)
EGFRCR SERPLBLD CKD-EPI 2021: 84 ML/MIN/1.73M2
FASTING STATUS PATIENT QL REPORTED: YES
GLUCOSE SERPL-MCNC: 122 MG/DL (ref 70–99)
HDLC SERPL-MCNC: 45 MG/DL
LDLC SERPL CALC-MCNC: 85 MG/DL
NONHDLC SERPL-MCNC: 102 MG/DL
POTASSIUM SERPL-SCNC: 4.2 MMOL/L (ref 3.4–5.3)
PROT SERPL-MCNC: 6.4 G/DL (ref 6.4–8.3)
SODIUM SERPL-SCNC: 139 MMOL/L (ref 135–145)
TRIGL SERPL-MCNC: 85 MG/DL

## 2024-04-18 PROCEDURE — 36415 COLL VENOUS BLD VENIPUNCTURE: CPT

## 2024-04-18 PROCEDURE — 80053 COMPREHEN METABOLIC PANEL: CPT

## 2024-04-18 PROCEDURE — 80061 LIPID PANEL: CPT

## 2024-05-26 ENCOUNTER — MYC REFILL (OUTPATIENT)
Dept: FAMILY MEDICINE | Facility: CLINIC | Age: 69
End: 2024-05-26
Payer: COMMERCIAL

## 2024-05-26 DIAGNOSIS — M48.02 SPINAL STENOSIS IN CERVICAL REGION: ICD-10-CM

## 2024-05-26 DIAGNOSIS — I10 BENIGN ESSENTIAL HYPERTENSION: ICD-10-CM

## 2024-05-26 DIAGNOSIS — M47.812 FACET ARTHROPATHY, CERVICAL: ICD-10-CM

## 2024-05-26 RX ORDER — TRIAMTERENE/HYDROCHLOROTHIAZID 37.5-25 MG
1 TABLET ORAL DAILY
Qty: 90 TABLET | Refills: 3 | Status: CANCELLED | OUTPATIENT
Start: 2024-05-26

## 2024-05-28 RX ORDER — CYCLOBENZAPRINE HCL 10 MG
5-10 TABLET ORAL
Qty: 20 TABLET | Refills: 1 | Status: SHIPPED | OUTPATIENT
Start: 2024-05-28

## 2024-05-28 RX ORDER — METOPROLOL TARTRATE 50 MG
50 TABLET ORAL 2 TIMES DAILY
Qty: 180 TABLET | Refills: 3 | Status: SHIPPED | OUTPATIENT
Start: 2024-05-28

## 2024-05-28 RX ORDER — GABAPENTIN 100 MG/1
100 CAPSULE ORAL
Qty: 90 CAPSULE | Refills: 3 | Status: SHIPPED | OUTPATIENT
Start: 2024-05-28

## 2024-06-25 ENCOUNTER — MYC MEDICAL ADVICE (OUTPATIENT)
Dept: FAMILY MEDICINE | Facility: CLINIC | Age: 69
End: 2024-06-25
Payer: COMMERCIAL

## 2024-07-01 ENCOUNTER — VIRTUAL VISIT (OUTPATIENT)
Dept: FAMILY MEDICINE | Facility: CLINIC | Age: 69
End: 2024-07-01
Payer: COMMERCIAL

## 2024-07-01 DIAGNOSIS — F03.90 MAJOR NEUROCOGNITIVE DISORDER (H): Primary | ICD-10-CM

## 2024-07-01 PROCEDURE — 99213 OFFICE O/P EST LOW 20 MIN: CPT | Mod: 95 | Performed by: FAMILY MEDICINE

## 2024-07-01 NOTE — PROGRESS NOTES
Juancarlos is a 68 year old who is being evaluated via a billable video visit.    How would you like to obtain your AVS? [employer]  If the video visit is dropped, the invitation should be resent by: Text to cell phone: [phone]  Will anyone else be joining your video visit? Wife Cassie        Assessment & Plan     Major neurocognitive disorder (H)  Patient was coherent overall on video visit, and he did not show physical distress or limitation.  He may participate in boxing as he described.  No sparring, no body contact activities. Fall prevention.                Patient Instructions   You may participate in the boxing program as you described.  Letter has been saved in your chart and you may access it through [employer].    Subjective   Juancarlos is a 68 year old, presenting for the following health issues:  Letter Request (Pt would like to receive a letter of permission to participate in a boxing program for Parkinson's. )        7/1/2024     4:04 PM   Additional Questions   Roomed by Alicia DOMINGO MA   Accompanied by self         7/1/2024   Forms   Any forms needing to be completed Yes          7/1/2024     4:04 PM   Patient Reported Additional Medications   Patient reports taking the following new medications none     Video Start Time: 5:05 PM    History of Present Illness       Reason for visit:  Letter request    He eats 2-3 servings of fruits and vegetables daily.He consumes 0 sweetened beverage(s) daily.He exercises with enough effort to increase his heart rate 9 or less minutes per day.  He exercises with enough effort to increase his heart rate 3 or less days per week.   He is taking medications regularly.     Chief Complaint   Patient presents with    Letter Request     Pt would like to receive a letter of permission to participate in a boxing program for Parkinson's.        Patient has major neurocognitive disorder that is gradually progressing.   His spouse reports he sometimes feels easily sleepy after activities but not  dyspneic or other acute symptoms.  Patient would like to participate in boxing exercises/program in the community. He describes it as punching equipment and socializing with other participants.  Spouse reports patient has been able to perform many house and yard chores with no difficulty.  Spouse reports his neurolgist also gave patient permission to participate.      Review of Systems  Constitutional, HEENT, cardiovascular, pulmonary, GI, , musculoskeletal, neuro, skin, endocrine and psych systems are negative, except as otherwise noted.      Objective    Vitals - Patient Reported  Pain Score: No Pain (0) (Pt unavailable for pain scale question.)        Physical Exam   GENERAL: alert and no distress  EYES: Eyes grossly normal to inspection.  No discharge or erythema, or obvious scleral/conjunctival abnormalities.  RESP: No audible wheeze, cough, or visible cyanosis.    SKIN: Visible skin clear. No significant rash, abnormal pigmentation or lesions.  NEURO: Cranial nerves grossly intact.  Mentation and speech appropriate for age.  PSYCH: Appropriate affect, tone, and pace of words    No results found for any visits on 07/01/24.      Video-Visit Details    Type of service:  Video Visit   Video End Time: 5:15 PM  Originating Location (pt. Location): Home    Distant Location (provider location):  On-site  Platform used for Video Visit: [employer]  Signed Electronically by: Rafael Velasco MD

## 2024-07-01 NOTE — LETTER
July 1, 2024      Quang SANTORO [place]  [id number] [facility] [address]        To Whom It May Concern,       Juancarlos was last seen in clinic for a general physical examination in April 2024. At the time, he did not show any acute medical condition that would inhibit his ability to participate in boxing activities as he has described. He may participate in the described activity but would not be recommended to engage in boxing with sparring or body contact that could risk him for head trauma or fall.        Sincerely,        Rafael Velasco MD

## 2024-12-17 ENCOUNTER — TRANSFERRED RECORDS (OUTPATIENT)
Dept: HEALTH INFORMATION MANAGEMENT | Facility: CLINIC | Age: 69
End: 2024-12-17
Payer: COMMERCIAL

## 2025-02-07 ENCOUNTER — MYC REFILL (OUTPATIENT)
Dept: FAMILY MEDICINE | Facility: CLINIC | Age: 70
End: 2025-02-07
Payer: COMMERCIAL

## 2025-02-07 DIAGNOSIS — M47.812 FACET ARTHROPATHY, CERVICAL: ICD-10-CM

## 2025-02-07 DIAGNOSIS — E78.2 MIXED HYPERLIPIDEMIA: ICD-10-CM

## 2025-02-07 DIAGNOSIS — M48.02 SPINAL STENOSIS IN CERVICAL REGION: ICD-10-CM

## 2025-02-07 DIAGNOSIS — I10 BENIGN ESSENTIAL HYPERTENSION: ICD-10-CM

## 2025-02-10 RX ORDER — LISINOPRIL 5 MG/1
5 TABLET ORAL DAILY
Qty: 90 TABLET | Refills: 3 | Status: SHIPPED | OUTPATIENT
Start: 2025-02-10

## 2025-02-10 RX ORDER — ROSUVASTATIN CALCIUM 20 MG/1
20 TABLET, COATED ORAL DAILY
Qty: 90 TABLET | Refills: 3 | Status: SHIPPED | OUTPATIENT
Start: 2025-02-10

## 2025-02-10 RX ORDER — GABAPENTIN 100 MG/1
100 CAPSULE ORAL
Qty: 90 CAPSULE | Refills: 3 | Status: SHIPPED | OUTPATIENT
Start: 2025-02-10

## 2025-02-10 RX ORDER — METOPROLOL TARTRATE 50 MG
50 TABLET ORAL 2 TIMES DAILY
Qty: 180 TABLET | Refills: 3 | Status: SHIPPED | OUTPATIENT
Start: 2025-02-10

## 2025-02-10 RX ORDER — CYCLOBENZAPRINE HCL 10 MG
5-10 TABLET ORAL
Qty: 20 TABLET | Refills: 1 | Status: SHIPPED | OUTPATIENT
Start: 2025-02-10

## 2025-04-19 ENCOUNTER — LAB (OUTPATIENT)
Dept: LAB | Facility: CLINIC | Age: 70
End: 2025-04-19
Payer: COMMERCIAL

## 2025-04-19 DIAGNOSIS — R73.01 IMPAIRED FASTING GLUCOSE: ICD-10-CM

## 2025-04-19 LAB
EST. AVERAGE GLUCOSE BLD GHB EST-MCNC: 123 MG/DL
FASTING STATUS PATIENT QL REPORTED: YES
GLUCOSE SERPL-MCNC: 110 MG/DL (ref 70–99)
HBA1C MFR BLD: 5.9 % (ref 0–5.6)

## 2025-04-19 PROCEDURE — 83036 HEMOGLOBIN GLYCOSYLATED A1C: CPT

## 2025-04-19 PROCEDURE — 82947 ASSAY GLUCOSE BLOOD QUANT: CPT

## 2025-04-19 PROCEDURE — 36415 COLL VENOUS BLD VENIPUNCTURE: CPT

## 2025-04-28 ENCOUNTER — TELEPHONE (OUTPATIENT)
Dept: FAMILY MEDICINE | Facility: CLINIC | Age: 70
End: 2025-04-28
Payer: COMMERCIAL

## 2025-04-28 DIAGNOSIS — I10 BENIGN ESSENTIAL HYPERTENSION: ICD-10-CM

## 2025-04-28 DIAGNOSIS — M47.812 FACET ARTHROPATHY, CERVICAL: ICD-10-CM

## 2025-04-28 DIAGNOSIS — M48.02 SPINAL STENOSIS IN CERVICAL REGION: ICD-10-CM

## 2025-04-28 RX ORDER — GABAPENTIN 100 MG/1
200 CAPSULE ORAL
Qty: 180 CAPSULE | Refills: 3 | Status: SHIPPED | OUTPATIENT
Start: 2025-04-28

## 2025-04-28 RX ORDER — TRIAMTERENE AND HYDROCHLOROTHIAZIDE 37.5; 25 MG/1; MG/1
1 TABLET ORAL
Qty: 90 TABLET | Refills: 3 | Status: SHIPPED | OUTPATIENT
Start: 2025-04-28

## 2025-05-02 SDOH — HEALTH STABILITY: PHYSICAL HEALTH: ON AVERAGE, HOW MANY DAYS PER WEEK DO YOU ENGAGE IN MODERATE TO STRENUOUS EXERCISE (LIKE A BRISK WALK)?: 3 DAYS

## 2025-05-02 SDOH — HEALTH STABILITY: PHYSICAL HEALTH: ON AVERAGE, HOW MANY MINUTES DO YOU ENGAGE IN EXERCISE AT THIS LEVEL?: 20 MIN

## 2025-05-02 ASSESSMENT — SOCIAL DETERMINANTS OF HEALTH (SDOH): HOW OFTEN DO YOU GET TOGETHER WITH FRIENDS OR RELATIVES?: MORE THAN THREE TIMES A WEEK

## 2025-05-07 ENCOUNTER — RESULTS FOLLOW-UP (OUTPATIENT)
Dept: FAMILY MEDICINE | Facility: CLINIC | Age: 70
End: 2025-05-07

## 2025-05-07 ENCOUNTER — OFFICE VISIT (OUTPATIENT)
Dept: FAMILY MEDICINE | Facility: CLINIC | Age: 70
End: 2025-05-07
Attending: FAMILY MEDICINE
Payer: COMMERCIAL

## 2025-05-07 VITALS
DIASTOLIC BLOOD PRESSURE: 72 MMHG | OXYGEN SATURATION: 93 % | RESPIRATION RATE: 16 BRPM | HEART RATE: 55 BPM | BODY MASS INDEX: 35.81 KG/M2 | WEIGHT: 241.8 LBS | HEIGHT: 69 IN | TEMPERATURE: 97 F | SYSTOLIC BLOOD PRESSURE: 132 MMHG

## 2025-05-07 DIAGNOSIS — E66.01 CLASS 2 SEVERE OBESITY WITH BODY MASS INDEX (BMI) OF 35 TO 39.9 WITH SERIOUS COMORBIDITY (H): ICD-10-CM

## 2025-05-07 DIAGNOSIS — R73.01 ELEVATED FASTING GLUCOSE: ICD-10-CM

## 2025-05-07 DIAGNOSIS — G30.9 ALZHEIMER'S DISEASE (H): ICD-10-CM

## 2025-05-07 DIAGNOSIS — Z00.00 ENCOUNTER FOR MEDICARE ANNUAL WELLNESS EXAM: Primary | ICD-10-CM

## 2025-05-07 DIAGNOSIS — R79.89 ELEVATED SERUM CREATININE: Primary | ICD-10-CM

## 2025-05-07 DIAGNOSIS — I10 BENIGN ESSENTIAL HYPERTENSION: ICD-10-CM

## 2025-05-07 DIAGNOSIS — F02.80 ALZHEIMER'S DISEASE (H): ICD-10-CM

## 2025-05-07 DIAGNOSIS — E66.812 CLASS 2 SEVERE OBESITY WITH BODY MASS INDEX (BMI) OF 35 TO 39.9 WITH SERIOUS COMORBIDITY (H): ICD-10-CM

## 2025-05-07 DIAGNOSIS — Z23 NEED FOR VACCINATION: ICD-10-CM

## 2025-05-07 DIAGNOSIS — I25.10 CORONARY ARTERY DISEASE INVOLVING NATIVE CORONARY ARTERY OF NATIVE HEART WITHOUT ANGINA PECTORIS: ICD-10-CM

## 2025-05-07 LAB
ANION GAP SERPL CALCULATED.3IONS-SCNC: 10 MMOL/L (ref 7–15)
BUN SERPL-MCNC: 17.6 MG/DL (ref 8–23)
CALCIUM SERPL-MCNC: 9.9 MG/DL (ref 8.8–10.4)
CHLORIDE SERPL-SCNC: 101 MMOL/L (ref 98–107)
CHOLEST SERPL-MCNC: 144 MG/DL
CREAT SERPL-MCNC: 1.24 MG/DL (ref 0.67–1.17)
EGFRCR SERPLBLD CKD-EPI 2021: 64 ML/MIN/1.73M2
FASTING STATUS PATIENT QL REPORTED: YES
FASTING STATUS PATIENT QL REPORTED: YES
GLUCOSE SERPL-MCNC: 117 MG/DL (ref 70–99)
HCO3 SERPL-SCNC: 27 MMOL/L (ref 22–29)
HDLC SERPL-MCNC: 43 MG/DL
LDLC SERPL CALC-MCNC: 86 MG/DL
NONHDLC SERPL-MCNC: 101 MG/DL
POTASSIUM SERPL-SCNC: 4.4 MMOL/L (ref 3.4–5.3)
SODIUM SERPL-SCNC: 138 MMOL/L (ref 135–145)
TRIGL SERPL-MCNC: 77 MG/DL

## 2025-05-07 PROCEDURE — 80048 BASIC METABOLIC PNL TOTAL CA: CPT | Performed by: FAMILY MEDICINE

## 2025-05-07 PROCEDURE — 80061 LIPID PANEL: CPT | Performed by: FAMILY MEDICINE

## 2025-05-07 PROCEDURE — 36415 COLL VENOUS BLD VENIPUNCTURE: CPT | Performed by: FAMILY MEDICINE

## 2025-05-07 RX ORDER — LORAZEPAM 0.5 MG/1
0.5 TABLET ORAL EVERY 6 HOURS PRN
COMMUNITY
Start: 2025-04-16

## 2025-05-07 RX ORDER — LEVETIRACETAM 250 MG/1
250 TABLET ORAL 2 TIMES DAILY
COMMUNITY
Start: 2025-02-07

## 2025-05-07 ASSESSMENT — PAIN SCALES - GENERAL: PAINLEVEL_OUTOF10: NO PAIN (0)

## 2025-05-07 NOTE — PROGRESS NOTES
Preventive Care Visit  [facility]  Rafael Velasco MD, Family Medicine  May 7, 2025      Assessment & Plan     Encounter for Medicare annual wellness exam  Patient's spouse was advised on recommended screening and preventive health recommendations.    Spouse said they would now get shingrix. Advsied to get from pharmacy for medicare coverage.    Benign essential hypertension  Well-controlled. No refills needed today.  - BASIC METABOLIC PANEL  - BASIC METABOLIC PANEL    Coronary artery disease involving native coronary artery of native heart without angina pectoris  Continue daily aspirin and managing comorbidities.  Reinforced heart healthy lifestyle.   - Lipid panel reflex to direct LDL Fasting  - Lipid panel reflex to direct LDL Fasting    Alzheimer's disease (H)  Progressing.   Patient was less engaged today and often has delays in processing instructions or gestures.  Continue follow up with neurology.  Encouraged to continue safe exercise, stimulating cognitive activities.  No driving - spouse assures.  Home safety.  Spouse has information of community resources in case care needs increase.    Need for vaccination  Advised RSV in the fall.    Class 2 severe obesity with body mass index (BMI) of 35 to 39.9 with serious comorbidity (H)  Increases complexity of management of the above chronic conditions.  Patient was advised healthy diet recommendations.  Patient was advised weekly exercise recommendations and goals.     The longitudinal plan of care for the diagnosis(es)/condition(s) as documented were addressed during this visit. Due to the added complexity in care, I will continue to support Juancarlos in the subsequent management and with ongoing continuity of care.        Patient has been advised of split billing requirements and indicates understanding: Yes        BMI  Estimated body mass index is 35.21 kg/m  as calculated from the following:    Height as of this encounter: 1.765 m  "(5' 9.49\").    Weight as of this encounter: 109.7 kg (241 lb 12.8 oz).   Weight management plan: Discussed healthy diet and exercise guidelines    Counseling  Appropriate preventive services were addressed with this patient via screening, questionnaire, or discussion as appropriate for fall prevention, nutrition, physical activity, Tobacco-use cessation, social engagement, weight loss and cognition.  Checklist reviewing preventive services available has been given to the patient.  Reviewed patient's diet, addressing concerns and/or questions.   He is at risk for lack of exercise and has been provided with information to increase physical activity for the benefit of his well-being.   He is at risk for psychosocial distress and has been provided with information to reduce risk.   Discussed possible causes of fatigue. Updated plan of care.  Patient reported difficulty with activities of daily living were addressed today.The patient was provided with written information regarding signs of hearing loss.       Follow-up   Return in about 1 year (around 5/7/2026) for In-person visit for next wellness exam.     Follow-up Visit   Expected date:  May 14, 2026 (Approximate)      Follow Up Appointment Details:     Follow-up with whom?: PCP    Follow-Up for what?: Medicare Wellness    Welcome or Annual?: Annual Wellness    How?: In Person                 Subjective   Juancarlos is a 69 year old, presenting for the following:  Physical (fasting)        5/7/2025    10:18 AM   Additional Questions   Roomed by Connie Espinoza   Accompanied by self         5/7/2025    10:18 AM   Patient Reported Additional Medications   Patient reports taking the following new medications none           HPI       Sees neurology for Alzheimer's disease.  Does boxing therapy with [facility].   Spouse said patient's condition has progressed over the last year.  Eating still overall good but sometimes would act like he is not hungry. Can make own sandwich easily " last year but now, having more difficulty.  Still dresses and showering by himself.    Advance Care Planning    Patient states has Health Care Directive and will send to [employer].        5/2/2025   General Health   How would you rate your overall physical health? (!) FAIR   Feel stress (tense, anxious, or unable to sleep) To some extent   (!) STRESS CONCERN      5/2/2025   Nutrition   Diet: Low salt    Low fat/cholesterol       Multiple values from one day are sorted in reverse-chronological order         5/2/2025   Exercise   Days per week of moderate/strenous exercise 3 days   Average minutes spent exercising at this level 20 min         5/2/2025   Social Factors   Frequency of gathering with friends or relatives More than three times a week   Worry food won't last until get money to buy more No   Food not last or not have enough money for food? No   Do you have housing? (Housing is defined as stable permanent housing and does not include staying outside in a car, in a tent, in an abandoned building, in an overnight shelter, or couch-surfing.) Yes   Are you worried about losing your housing? No   Lack of transportation? No   Unable to get utilities (heat,electricity)? No         5/7/2025   Fall Risk   Gait Speed Test (Document in seconds) 5.32   Gait Speed Test Interpretation Greater than 5.01 seconds - ABNORMAL          5/2/2025   Activities of Daily Living- Home Safety   Needs help with the following daily activites Telephone use    Transportation    Shopping    Preparing meals    Housework    Laundry    Medication administration    Money management   Safety concerns in the home None of the above       Multiple values from one day are sorted in reverse-chronological order         5/2/2025   Dental   Dentist two times every year? Yes         5/2/2025   Hearing Screening   Hearing concerns? (!) I FEEL THAT PEOPLE ARE MUMBLING OR NOT SPEAKING CLEARLY.    (!) I NEED TO ASK PEOPLE TO SPEAK UP OR REPEAT  THEMSELVES.    (!) IT'S HARDER TO UNDERSTAND WOMEN'S VOICES THAN MEN'S VOICES.    (!) IT'S HARD TO FOLLOW A CONVERSATION IN A NOISY RESTAURANT OR CROWDED ROOM.    (!) TROUBLE UNDESTANDING A SPEAKER IN A PUBLIC MEETING OR Cong[employer] SERVICE.    (!) TROUBLE FOLLOWING DIALOGUE IN THE THEATHER.    (!) TROUBLE UNDERSTANDING SOFT OR WHISPERED SPEECH.       Multiple values from one day are sorted in reverse-chronological order         5/2/2025   Driving Risk Screening   Patient/family members have concerns about driving (!) DECLINE         5/2/2025   General Alertness/Fatigue Screening   Have you been more tired than usual lately? (!) YES         5/2/2025   Urinary Incontinence Screening   Bothered by leaking urine in past 6 months No           Today's PHQ-2 Score:       5/7/2025    10:08 AM   PHQ-2 ( 1999 [employer])   Q1: Little interest or pleasure in doing things 1   Q2: Feeling down, depressed or hopeless 1   PHQ-2 Score 2    Q1: Little interest or pleasure in doing things Several days   Q2: Feeling down, depressed or hopeless Several days   PHQ-2 Score 2       Patient-reported         5/2/2025   Substance Use   Alcohol more than 3/day or more than 7/wk No   Do you have a current opioid prescription? No   How severe/bad is pain from 1 to 10? 3/10   Do you use any other substances recreationally? No     Social History     Tobacco Use    Smoking status: Never    Smokeless tobacco: Never    Tobacco comments:     None   Vaping Use    Vaping status: Never Used   Substance Use Topics    Alcohol use: Yes     Comment: Very seldom    Drug use: No           5/2/2025   AAA Screening   Family history of Abdominal Aortic Aneurysm (AAA)? No   Last PSA:   PSA   Date Value Ref Range Status   08/27/2019 0.33 0 - 4 ug/L Final     Comment:     Assay Method:  Chemiluminescence using Siemens Vista analyzer     ASCVD Risk   The 10-year ASCVD risk score (Christopher VAZQUEZ, et al., 2019) is: 15.7%    Values used to calculate the score:      Age:  69 years      Sex: Male      Is Non- : No      Diabetic: No      Tobacco smoker: No      Systolic Blood Pressure: 132 mmHg      Is BP treated: Yes      HDL Cholesterol: 45 mg/dL      Total Cholesterol: 147 mg/dL            Reviewed and updated as needed this visit by Provider     Meds                Past Medical History:   Diagnosis Date    Depressive disorder 2023    Alzheimer s diagnosis    Heart disease     stent 2011    Hyperlipidemia LDL goal < 130     Hypertension     Had stint added at 47years old     Past Surgical History:   Procedure Laterality Date    CARDIAC SURGERY      1 stentplaced    COLONOSCOPY N/A 2/23/2018    Procedure: COLONOSCOPY;  colonoscopy;  Surgeon: Daniel Caldera MD;  Location: [facility]     Patient Active Problem List   Diagnosis    Mixed hyperlipidemia    Benign essential hypertension    Coronary artery disease involving native coronary artery of native heart without angina pectoris    Impacted cerumen of both ears    Class 2 severe obesity with body mass index (BMI) of 35 to 39.9 with serious comorbidity (H)    Vaccine refused by patient    Major neurocognitive disorder (H)     Past Surgical History:   Procedure Laterality Date    CARDIAC SURGERY      1 stentplaced    COLONOSCOPY N/A 2/23/2018    Procedure: COLONOSCOPY;  colonoscopy;  Surgeon: Daniel Caldera MD;  Location: [facility]       Social History     Tobacco Use    Smoking status: Never    Smokeless tobacco: Never    Tobacco comments:     None   Substance Use Topics    Alcohol use: Yes     Comment: Very seldom     Family History   Problem Relation Age of Onset    Cancer Mother         brain tumor    C.A.D. Mother     Alcohol/Drug Mother         smoker    Diabetes Mother     Cerebrovascular Disease Mother     Cancer Father         hodgkins    Alcohol/Drug Father         smoker    Other Cancer Father     Diabetes Father     Hypertension Brother     Diabetes Maternal Half-Brother     Other Cancer Nephew          Brothers son         Current Outpatient Medications   Medication Sig Dispense Refill    aspirin 81 MG tablet Take 1 tablet by mouth daily.      cyclobenzaprine (FLEXERIL) 10 MG tablet Take 0.5-1 tablets (5-10 mg) by mouth nightly as needed for muscle spasms. 20 tablet 1    gabapentin (NEURONTIN) 100 MG capsule Take 2 capsules (200 mg) by mouth nightly as needed for neuropathic pain. 180 capsule 3    levETIRAcetam (KEPPRA) 250 MG tablet Take 250 mg by mouth 2 times daily. (Patient taking differently: Take 250 mg by mouth 2 times daily. One tablet daily but if patient gets more spasms, he takes 1 tablet twice daily.)      lisinopril (ZESTRIL) 5 MG tablet Take 1 tablet (5 mg) by mouth daily. 90 tablet 3    LORazepam (ATIVAN) 0.5 MG tablet Take 0.5 mg by mouth every 6 hours as needed for anxiety.      metoprolol tartrate (LOPRESSOR) 50 MG tablet Take 1 tablet (50 mg) by mouth 2 times daily. 180 tablet 3    rosuvastatin (CRESTOR) 20 MG tablet Take 1 tablet (20 mg) by mouth daily. 90 tablet 3    triamterene-HCTZ (MAXZIDE-25) 37.5-25 MG tablet TAKE ONE TABLET BY MOUTH ONCE DAILY 90 tablet 3     No Known Allergies  Current providers sharing in care for this patient include:  Patient Care Team:  Rafael Velasco MD as PCP - General (Family Practice)  Rafael Velasco MD as Assigned PCP    The following health maintenance items are reviewed in Epic and correct as of today:  Health Maintenance   Topic Date Due    RSV VACCINE (1 - Risk 60-74 years 1-dose series) Never done    BMP  04/18/2025    LIPID  04/18/2025    MEDICARE ANNUAL WELLNESS VISIT  05/07/2026    ANNUAL REVIEW OF HM ORDERS  05/07/2026    FALL RISK ASSESSMENT  05/07/2026    COLORECTAL CANCER SCREENING  02/23/2028    DIABETES SCREENING  04/19/2028    DTAP/TDAP/TD IMMUNIZATION (3 - Td or Tdap) 06/09/2028    ADVANCE CARE PLANNING  05/07/2030    HEPATITIS C SCREENING  Completed    PHQ-2 (once per calendar year)  Completed    HPV IMMUNIZATION  Aged  "Out    MENINGITIS IMMUNIZATION  Aged Out    INFLUENZA VACCINE  Discontinued    Pneumococcal Vaccine: 50+ Years  Discontinued    ZOSTER IMMUNIZATION  Discontinued    COVID-19 Vaccine  Discontinued         Review of Systems  Constitutional, HEENT, cardiovascular, pulmonary, GI, , musculoskeletal, neuro, skin, endocrine and psych systems are negative, except as otherwise noted.     Objective    Exam  /72 (BP Location: Right arm, Patient Position: Sitting, Cuff Size: Adult Large)   Pulse 55   Temp 97  F (36.1  C) (Tympanic)   Resp 16   Ht 1.765 m (5' 9.49\")   Wt 109.7 kg (241 lb 12.8 oz)   SpO2 93%   BMI 35.21 kg/m     Estimated body mass index is 35.21 kg/m  as calculated from the following:    Height as of this encounter: 1.765 m (5' 9.49\").    Weight as of this encounter: 109.7 kg (241 lb 12.8 oz).    Physical Exam  GENERAL APPEARANCE: obese, pleasantly demented, alert and no distress  EYES: pink conj, no icterus, PERRL, EOMI  HENT: ear canals and TM's normal, nose clear,  mouth without ulcers or lesions, oropharynx clear and oral mucous membranes moist  NECK: no adenopathy, no asymmetry, masses, or scars and thyroid normal to palpation  RESP: lungs clear to auscultation - no rales, rhonchi or wheezes  CV: normal rate, regular rhythm, no murmur,   ABDOMEN: protuberant, soft, nontender, no hepatosplenomegaly, no masses and bowel sounds normal  DIRECT RECTAL EXAM : deferrd  MS: no musculoskeletal defects are noted and gait is age appropriate without ataxia; no edema  SKIN: no suspicious lesions or rashes  NEURO: Normal strength and tone, sensory exam grossly normal, no tremors, CNs grossly intact  COGNITION: spaces out often, delay in giving answers, often just chuckles or smiles when asked a question, is able to follow simple instructions          5/7/2025   Mini Cog   Mini-Cog Not Completed (choose reason) Mental handicap              Signed Electronically by: Rafael Velasco MD    "

## 2025-05-07 NOTE — PATIENT INSTRUCTIONS
Patient Education     Be consistent with low salt, low trans fat and low saturated fat diet.  Eat food rich in omega-3-fatty acids as you tolerate. (salmon, olive oil)  Eat 5 cups of vegetables, fruits and whole grains per day.  Limit starchy food (white rice, white bread, white pasta, white potatoes) to less than a cup per meal.  Minimize sweets, junk food and fastfood. Limit soda beverages to one serving per day; best to avoid it altogether though.    Exercise as able.    Blood tests: You will be contacted in 1-2 days for results of your lab tests.        Preventive Care Advice   This is general advice given by our system to help you stay healthy. However, your care team may have specific advice just for you. Please talk to your care team about your preventive care needs.  Nutrition  Eat 5 or more servings of fruits and vegetables each day.  Try wheat bread, brown rice and whole grain pasta (instead of white bread, rice, and pasta).  Get enough calcium and vitamin D. Check the label on foods and aim for 100% of the RDA (recommended daily allowance).  Lifestyle  Exercise at least 150 minutes each week  (30 minutes a day, 5 days a week).  Do muscle strengthening activities 2 days a week. These help control your weight and prevent disease.  No smoking.  Wear sunscreen to prevent skin cancer.  Have a dental exam and cleaning every 6 months.  Yearly exams  See your health care team every year to talk about:  Any changes in your health.  Any medicines your care team has prescribed.  Preventive care, family planning, and ways to prevent chronic diseases.  Shots (vaccines)   HPV shots (up to age 26), if you've never had them before.  Hepatitis B shots (up to age 59), if you've never had them before.  COVID-19 shot: Get this shot when it's due.  Flu shot: Get a flu shot every year.  Tetanus shot: Get a tetanus shot every 10 years.  Pneumococcal, hepatitis A, and RSV shots: Ask your care team if you need these based on your  risk.  Shingles shot (for age 50 and up)  General health tests  Diabetes screening:  Starting at age 35, Get screened for diabetes at least every 3 years.  If you are younger than age 35, ask your care team if you should be screened for diabetes.  Cholesterol test: At age 39, start having a cholesterol test every 5 years, or more often if advised.  Bone density scan (DEXA): At age 50, ask your care team if you should have this scan for osteoporosis (brittle bones).  Hepatitis C: Get tested at least once in your life.  STIs (sexually transmitted infections)  Before age 24: Ask your care team if you should be screened for STIs.  After age 24: Get screened for STIs if you're at risk. You are at risk for STIs (including HIV) if:  You are sexually active with more than one person.  You don't use condoms every time.  You or a partner was diagnosed with a sexually transmitted infection.  If you are at risk for HIV, ask about PrEP medicine to prevent HIV.  Get tested for HIV at least once in your life, whether you are at risk for HIV or not.  Cancer screening tests  Cervical cancer screening: If you have a cervix, begin getting regular cervical cancer screening tests starting at age 21.  Breast cancer scan (mammogram): If you've ever had breasts, begin having regular mammograms starting at age 40. This is a scan to check for breast cancer.  Colon cancer screening: It is important to start screening for colon cancer at age 45.  Have a colonoscopy test every 10 years (or more often if you're at risk) Or, ask your provider about stool tests like a FIT test every year or Cologuard test every 3 years.  To learn more about your testing options, visit:   .  For help making a decision, visit:   https://bit.ly/zz72750.  Prostate cancer screening test: If you have a prostate, ask your care team if a prostate cancer screening test (PSA) at age 55 is right for you.  Lung cancer screening: If you are a current or former smoker ages 50  to 80, ask your care team if ongoing lung cancer screenings are right for you.  For informational purposes only. Not to replace the advice of your health care provider. Copyright   2023 [facility]. All rights reserved. Clinically reviewed by the [facility]. [employer] 605346 - REV 01/24.  Learning About Activities of Daily Living  What are activities of daily living?     Activities of daily living (ADLs) are the basic self-care tasks you do every day. These include eating, bathing, dressing, and moving around.  As you age, and if you have health problems, you may find that it's harder to do some of these tasks. If so, your doctor can suggest ideas that may help.  To measure what kind of help you may need, your doctor will ask how well you are able to do ADLs. Let your doctor know if there are any tasks that you are having trouble doing. This is an important first step to getting help. And when you have the help you need, you can stay as independent as possible.  How will a doctor assess your ADLs?  Asking about ADLs is part of a routine health checkup your doctor will likely do as you age. Your health check might be done in a doctor's office, in your home, or at a hospital. The goal is to find out if you are having any problems that could make it hard to care for yourself or that make it unsafe for you to be on your own.  To measure your ADLs, your doctor will ask how hard it is for you to do routine tasks. Your doctor may also want to know if you have changed the way you do a task because of a health problem. Your doctor may watch how you:  Walk back and forth.  Keep your balance while you stand or walk.  Move from sitting to standing or from a bed to a chair.  Button or unbutton a shirt or sweater.  Remove and put on your shoes.  It's common to feel a little worried or anxious if you find you can't do all the things you used to be able to do. Talking with your doctor about  ADLs is a way to make sure you're as safe as possible and able to care for yourself as well as you can. You may want to bring a caregiver, friend, or family member to your checkup. They can help you talk to your doctor.  Follow-up care is a key part of your treatment and safety. Be sure to make and go to all appointments, and call your doctor if you are having problems. It's also a good idea to know your test results and keep a list of the medicines you take.  Current as of: October 24, 2024  Content Version: 14.4    7281-8739 [employer].   Care instructions adapted under license by your healthcare professional. If you have questions about a medical condition or this instruction, always ask your healthcare professional. [employer] disclaims any warranty or liability for your use of this information.    Preventing Falls: Care Instructions  Injuries and health problems such as trouble walking or poor eyesight can increase your risk of falling. So can some medicines. But there are things you can do to help prevent falls. You can exercise to get stronger. You can also arrange your home to make it safer.    Talk to your doctor about the medicines you take. Ask if any of them increase the risk of falls and whether they can be changed or stopped.   Try to exercise regularly. It can help improve your strength and balance. This can help lower your risk of falling.         Practice fall safety and prevention.   Wear low-heeled shoes that fit well and give your feet good support. Talk to your doctor if you have foot problems that make this hard.  Carry a cellphone or wear a medical alert device that you can use to call for help.  Use stepladders instead of chairs to reach high objects. Don't climb if you're at risk for falls. Ask for help, if needed.  Wear the correct eyeglasses, if you need them.        Make your home safer.   Remove rugs, cords, clutter, and furniture from walkways.  Keep your house  "well lit. Use night-lights in hallways and bathrooms.  Install and use sturdy handrails on stairways.  Wear nonskid footwear, even inside. Don't walk barefoot or in socks without shoes.        Be safe outside.   Use handrails, curb cuts, and ramps whenever possible.  Keep your hands free by using a shoulder bag or backpack.  Try to walk in well-lit areas. Watch out for uneven ground, changes in pavement, and debris.  Be careful in the winter. Walk on the grass or gravel when sidewalks are slippery. Use de-icer on steps and walkways. Add non-slip devices to shoes.    Put grab bars and nonskid mats in your shower or tub and near the toilet. Try to use a shower chair or bath bench when bathing.   Get into a tub or shower by putting in your weaker leg first. Get out with your strong side first. Have a phone or medical alert device in the bathroom with you.   Where can you learn more?  Go to https://www.Ourpalm.net/patiented  Enter G117 in the search box to learn more about \"Preventing Falls: Care Instructions.\"  Current as of: July 31, 2024  Content Version: 14.4    3562-4246 [employer].   Care instructions adapted under license by your healthcare professional. If you have questions about a medical condition or this instruction, always ask your healthcare professional. [employer] disclaims any warranty or liability for your use of this information.    Hearing Loss: Care Instructions  Overview     Hearing loss is a sudden or slow decrease in how well you hear. It can range from slight to profound. Permanent hearing loss can occur with aging. It also can happen when you are exposed long-term to loud noise. Examples include listening to loud music, riding motorcycles, or being around other loud machines.  Hearing loss can affect your work and home life. It can make you feel lonely or depressed. You may feel that you have lost your independence. But hearing aids and other devices can help you hear " better and feel connected to others.  Follow-up care is a key part of your treatment and safety. Be sure to make and go to all appointments, and call your doctor if you are having problems. It's also a good idea to know your test results and keep a list of the medicines you take.  How can you care for yourself at home?  Avoid loud noises whenever possible. This helps keep your hearing from getting worse.  Always wear hearing protection around loud noises.  Wear a hearing aid as directed.  A professional can help you pick a hearing aid that will work best for you.  You can also get hearing aids over the counter for mild to moderate hearing loss.  Have hearing tests as your doctor suggests. They can show whether your hearing has changed. Your hearing aid may need to be adjusted.  Use other devices as needed. These may include:  Telephone amplifiers and hearing aids that can connect to a television, stereo, radio, or microphone.  Devices that use lights or vibrations. These alert you to the doorbell, a ringing telephone, or a baby monitor.  Television closed-captioning. This shows the words at the bottom of the screen. Most new TVs can do this.  TTY (text telephone). This lets you type messages back and forth on the telephone instead of talking or listening. These devices are also called TDD. When messages are typed on the keyboard, they are sent over the phone line to a receiving TTY. The message is shown on a monitor.  Use text messaging, social media, and email if it is hard for you to communicate by telephone.  Try to learn a listening technique called speechreading. It is not lipreading. You pay attention to people's gestures, expressions, posture, and tone of voice. These clues can help you understand what a person is saying. Face the person you are talking to, and have them face you. Make sure the lighting is good. You need to see the other person's face clearly.  Think about counseling if you need help to  "adjust to your hearing loss.  When should you call for help?  Watch closely for changes in your health, and be sure to contact your doctor if:    You think your hearing is getting worse.     You have new symptoms, such as dizziness or nausea.   Where can you learn more?  Go to https://www.[employer].net/patiented  Enter R798 in the search box to learn more about \"Hearing Loss: Care Instructions.\"  Current as of: October 27, 2024  Content Version: 14.4    2359-9361 [employer].   Care instructions adapted under license by your healthcare professional. If you have questions about a medical condition or this instruction, always ask your healthcare professional. [employer] disclaims any warranty or liability for your use of this information.    Learning About Stress  What is stress?     Stress is your body's response to a hard situation. Your body can have a physical, emotional, or mental response. Stress is a fact of life for most people, and it affects everyone differently. What causes stress for you may not be stressful for someone else.  A lot of things can cause stress. You may feel stress when you go on a job interview, take a test, or run a race. This kind of short-term stress is normal and even useful. It can help you if you need to work hard or react quickly. For example, stress can help you finish an important job on time.  Long-term stress is caused by ongoing stressful situations or events. Examples of long-term stress include long-term health problems, ongoing problems at work, or conflicts in your family. Long-term stress can harm your health.  How does stress affect your health?  When you are stressed, your body responds as though you are in danger. It makes hormones that speed up your heart, make you breathe faster, and give you a burst of energy. This is called the fight-or-flight stress response. If the stress is over quickly, your body goes back to normal and no harm is " done.  But if stress happens too often or lasts too long, it can have bad effects. Long-term stress can make you more likely to get sick, and it can make symptoms of some diseases worse. If you tense up when you are stressed, you may develop neck, shoulder, or low back pain. Stress is linked to high blood pressure and heart disease.  Stress also harms your emotional health. It can make you rodriges, tense, or depressed. Your relationships may suffer, and you may not do well at work or school.  What can you do to manage stress?  You can try these things to help manage stress:   Do something active. Exercise or activity can help reduce stress. Walking is a great way to get started. Even everyday activities such as housecleaning or yard work can help.  Try yoga or kin chi. These techniques combine exercise and meditation. You may need some training at first to learn them.  Do something you enjoy. For example, listen to music or go to a movie. Practice your hobby or do volunteer work.  Meditate. This can help you relax, because you are not worrying about what happened before or what may happen in the future.  Do guided imagery. Imagine yourself in any setting that helps you feel calm. You can use online videos, books, or a teacher to guide you.  Do breathing exercises. For example:  From a standing position, bend forward from the waist with your knees slightly bent. Let your arms dangle close to the floor.  Breathe in slowly and deeply as you return to a standing position. Roll up slowly and lift your head last.  Hold your breath for just a few seconds in the standing position.  Breathe out slowly and bend forward from the waist.  Let your feelings out. Talk, laugh, cry, and express anger when you need to. Talking with supportive friends or family, a counselor, or a oksana leader about your feelings is a healthy way to relieve stress. Avoid discussing your feelings with people who make you feel worse.  Write. It may help to  "write about things that are bothering you. This helps you find out how much stress you feel and what is causing it. When you know this, you can find better ways to cope.  What can you do to prevent stress?  You might try some of these things to help prevent stress:  Manage your time. This helps you find time to do the things you want and need to do.  Get enough sleep. Your body recovers from the stresses of the day while you are sleeping.  Get support. Your family, friends, and community can make a difference in how you experience stress.  Limit your news feed. Avoid or limit time on social media or news that may make you feel stressed.  Do something active. Exercise or activity can help reduce stress. Walking is a great way to get started.  Where can you learn more?  Go to https://www.Tomfoolery.net/patiented  Enter N032 in the search box to learn more about \"Learning About Stress.\"  Current as of: October 24, 2024  Content Version: 14.4    6580-1271 [employer].   Care instructions adapted under license by your healthcare professional. If you have questions about a medical condition or this instruction, always ask your healthcare professional. [employer] disclaims any warranty or liability for your use of this information.    Learning About Sleeping Well  What does sleeping well mean?     Sleeping well means getting enough sleep to feel good and stay healthy. How much sleep is enough varies among people.  The number of hours you sleep and how you feel when you wake up are both important. If you do not feel refreshed, you probably need more sleep. Another sign of not getting enough sleep is feeling tired during the day.  Experts recommend that adults get at least 7 or more hours of sleep per day. Children and older adults need more sleep.  Why is getting enough sleep important?  Getting enough quality sleep is a basic part of good health. When your sleep suffers, your physical health, mood, " "and your thoughts can suffer too. You may find yourself feeling more grumpy or stressed. Not getting enough sleep also can lead to serious problems, including injury, accidents, anxiety, and depression.  What might cause poor sleeping?  Many things can cause sleep problems, including:  Changes to your sleep schedule.  Stress. Stress can be caused by fear about a single event, such as giving a speech. Or you may have ongoing stress, such as worry about work or school.  Depression, anxiety, and other mental or emotional conditions.  Changes in your sleep habits or surroundings. This includes changes that happen where you sleep, such as noise, light, or sleeping in a different bed. It also includes changes in your sleep pattern, such as having jet lag or working a late shift.  Health problems, such as pain, breathing problems, and restless legs syndrome.  Lack of regular exercise.  Using alcohol, nicotine, or caffeine before bed.  How can you help yourself?  Here are some tips that may help you sleep more soundly and wake up feeling more refreshed.  Your sleeping area   Use your bedroom only for sleeping and sex. A bit of light reading may help you fall asleep. But if it doesn't, do your reading elsewhere in the house. Try not to use your TV, computer, smartphone, or tablet while you are in bed.  Be sure your bed is big enough to stretch out comfortably, especially if you have a sleep partner.  Keep your bedroom quiet, dark, and cool. Use curtains, blinds, or a sleep mask to block out light. To block out noise, use earplugs, soothing music, or a \"white noise\" machine.  Your evening and bedtime routine   Create a relaxing bedtime routine. You might want to take a warm shower or bath, or listen to soothing music.  Go to bed at the same time every night. And get up at the same time every morning, even if you feel tired.  What to avoid   Limit caffeine (coffee, tea, caffeinated sodas) during the day, and don't have any for " "at least 6 hours before bedtime.  Avoid drinking alcohol before bedtime. Alcohol can cause you to wake up more often during the night.  Try not to smoke or use tobacco, especially in the evening. Nicotine can keep you awake.  Limit naps during the day, especially close to bedtime.  Avoid lying in bed awake for too long. If you can't fall asleep or if you wake up in the middle of the night and can't get back to sleep within about 20 minutes, get out of bed and go to another room until you feel sleepy.  Avoid taking medicine right before bed that may keep you awake or make you feel hyper or energized. Your doctor can tell you if your medicine may do this and if you can take it earlier in the day.  If you can't sleep   Imagine yourself in a peaceful, pleasant scene. Focus on the details and feelings of being in a place that is relaxing.  Get up and do a quiet or boring activity until you feel sleepy.  Avoid drinking any liquids before going to bed to help prevent waking up often to use the bathroom.  Where can you learn more?  Go to https://www.[employer].net/patiented  Enter J942 in the search box to learn more about \"Learning About Sleeping Well.\"  Current as of: July 31, 2024  Content Version: 14.4 2024-2025 [employer].   Care instructions adapted under license by your healthcare professional. If you have questions about a medical condition or this instruction, always ask your healthcare professional. [employer] disclaims any warranty or liability for your use of this information.       "

## 2025-06-18 ENCOUNTER — MYC REFILL (OUTPATIENT)
Dept: FAMILY MEDICINE | Facility: CLINIC | Age: 70
End: 2025-06-18
Payer: COMMERCIAL

## 2025-06-18 RX ORDER — LORAZEPAM 0.5 MG/1
0.5 TABLET ORAL EVERY 6 HOURS PRN
OUTPATIENT
Start: 2025-06-18

## 2025-07-28 DIAGNOSIS — F41.9 ANXIETY: Primary | ICD-10-CM

## 2025-07-29 RX ORDER — LORAZEPAM 0.5 MG/1
0.5 TABLET ORAL EVERY 6 HOURS PRN
OUTPATIENT
Start: 2025-07-29

## 2025-07-29 RX ORDER — LORAZEPAM 0.5 MG/1
0.5 TABLET ORAL EVERY 6 HOURS PRN
Qty: 6 TABLET | Refills: 1 | Status: SHIPPED | OUTPATIENT
Start: 2025-07-29

## 2025-07-29 NOTE — TELEPHONE ENCOUNTER
Wife in good understanding. At last apt wife states this was brought up and asked if this medication could be filled by . This medication is as needed. Wife is just seeing if PCP would take this over?     .Marcella Stephenson PSC

## 2025-07-29 NOTE — TELEPHONE ENCOUNTER
This is a reported med.  Last prescriber was Dr. Hamilton padilla in [place].  Will defer refills to original prescribing provider.

## 2025-07-29 NOTE — TELEPHONE ENCOUNTER
I think I vaguely recall this now.  I'll refill now with one refill.  We will just need a virtual visit in September 2025 for medication follow up on lorazepam since it is a controlled prescription medication.

## 2025-07-30 NOTE — TELEPHONE ENCOUNTER
Spoke with wife and informed of refill sent to pharmacy. VV scheduled for 9/16 for follow up medication.  Donna Elise